# Patient Record
Sex: FEMALE | Race: WHITE | Employment: FULL TIME | URBAN - METROPOLITAN AREA
[De-identification: names, ages, dates, MRNs, and addresses within clinical notes are randomized per-mention and may not be internally consistent; named-entity substitution may affect disease eponyms.]

---

## 2017-10-05 ENCOUNTER — OFFICE VISIT (OUTPATIENT)
Dept: OBGYN CLINIC | Age: 32
End: 2017-10-05

## 2017-10-05 VITALS
BODY MASS INDEX: 34.07 KG/M2 | WEIGHT: 169 LBS | HEIGHT: 59 IN | DIASTOLIC BLOOD PRESSURE: 80 MMHG | SYSTOLIC BLOOD PRESSURE: 120 MMHG

## 2017-10-05 DIAGNOSIS — Z34.80 SUPERVISION OF OTHER NORMAL PREGNANCY, ANTEPARTUM: Primary | ICD-10-CM

## 2017-10-05 NOTE — MR AVS SNAPSHOT
Visit Information Date & Time Provider Department Dept. Phone Encounter #  
 10/5/2017  2:20 PM Claudy Yung MD Marco Antonio Roman [phone] [id number] Upcoming Health Maintenance Date Due INFLUENZA AGE 9 TO ADULT 8/1/2017 PAP AKA CERVICAL CYTOLOGY 9/29/2018 Allergies as of 10/5/2017  Review Complete On: 10/5/2017 By: Iesha Vergara LPN Severity Noted Reaction Type Reactions Amoxicillin  01/10/2013   Systemic Hives Current Immunizations  Reviewed on 5/2/2016 Name Date Influenza Vaccine Krissy Bloodgood) 11/4/2015 Tdap 2/10/2016 Not reviewed this visit Vitals BP Height(growth percentile) Weight(growth percentile) LMP BMI OB Status 120/80 4' 11\" (1.499 m) 169 lb (76.7 kg) 07/27/2017 34.13 kg/m2 Pregnant Smoking Status Current Every Day Smoker BMI and BSA Data Body Mass Index Body Surface Area  
 34.13 kg/m 2 1.79 m 2 Preferred Pharmacy Pharmacy Name Phone [id number] [place], [place] - [address] [facility] [phone] Your Updated Medication List  
  
   
This list is accurate as of: 10/5/17  2:49 PM.  Always use your most recent med list.  
  
  
  
  
 liothyronine 5 mcg tablet Commonly known as:  CYTOMEL PRENATAL VITAMIN PO Take  by mouth. SYNTHROID 125 mcg tablet Generic drug:  levothyroxine TAKE 1 TABLET BY MOUTH EVERY DAY Patient Instructions Weeks 10 to 14 of Your Pregnancy: Care Instructions Your Care Instructions By weeks 10 to 15 of your pregnancy, the placenta has formed inside your uterus. It is possible to hear your baby's heartbeat with a special ultrasound device. Your baby's eyes can and do move. The arms and legs can bend. This is a good time to think about testing for birth defects. There are two types of tests: screening and diagnostic.  Screening tests show the chance that a baby has a certain birth defect. They can't tell you for sure that your baby has a problem. Diagnostic tests show if a baby has a certain birth defect. It's your choice whether to have these tests. You and your partner can talk to your doctor or midwife about birth defects tests. Follow-up care is a key part of your treatment and safety. Be sure to make and go to all appointments, and call your doctor if you are having problems. It's also a good idea to know your test results and keep a list of the medicines you take. How can you care for yourself at home? Decide about tests · You can have screening tests and diagnostic tests to check for birth defects. The decision to have a test for birth defects is personal. Think about your age, your chance of passing on a family disease, your need to know about any problems, and what you might do after you have the test results. ¨ Triple or quadruple (quad) blood tests. These screening tests can be done between 15 and 20 weeks of pregnancy. They check the amounts of three or four substances in your blood. The doctor looks at these test results, along with your age and other factors, to find out the chance that your baby may have certain problems. ¨ Amniocentesis. This diagnostic test is used to look for chromosomal problems in the baby's cells. It can be done between 15 and 20 weeks of pregnancy, usually around week 16. 
¨ Nuchal translucency test. This test uses ultrasound to measure the thickness of the area at the back of the baby's neck. An increase in the thickness can be an early sign of Down syndrome. ¨ Chorionic villus sampling ([facility]). This is a test that looks for certain genetic problems with your baby. The same genes that are in your baby are in the placenta. A small piece of the placenta is taken out and tested. This test is done when you are 10 to 13 weeks pregnant. Ease discomfort · Slow down and take naps when you feel tired. · If your emotions swing, talk to someone. Crying, anxiety, and concentration problems are common. · If your gums bleed, try a softer toothbrush. If your gums are puffy and bleed a lot, see your dentist. 
· If you feel dizzy: ¨ Get up slowly after sitting or lying down. ¨ Drink plenty of fluids. ¨ Eat small snacks to keep your blood sugar stable. ¨ Put your head between your legs as though you were tying your shoelaces. ¨ Lie down with your legs higher than your head. Use pillows to prop up your feet. · If you have a headache: 
¨ Lie down. ¨ Ask your partner or a good friend for a neck massage. ¨ Try cool cloths over your forehead or across the back of your neck. ¨ Use acetaminophen (Tylenol) for pain relief. Do not use nonsteroidal anti-inflammatory drugs (NSAIDs), such as ibuprofen (Advil, Motrin) or naproxen (Aleve), unless your doctor says it is okay. · If you have a nosebleed, pinch your nose gently, and hold it for a short while. To prevent nosebleeds, try massaging a small dab of petroleum jelly, such as Vaseline, in your nostrils. · If your nose is stuffed up, try saline (saltwater) nose sprays. Do not use decongestant sprays. Care for your breasts · Wear a bra that gives you good support. · Know that changes in your breasts are normal. 
¨ Your breasts may get larger and more tender. Tenderness usually gets better by 12 weeks. ¨ Your nipples may get darker and larger, and small bumps around your nipples may show more. ¨ The veins in your chest and breasts may show more. · Don't worry about \"toughening'\" your nipples. Breastfeeding will naturally do this. Where can you learn more? Go to http://jo ann-elizabeth.info/. Enter B284 in the search box to learn more about \"Weeks 10 to 14 of Your Pregnancy: Care Instructions. \" Current as of: March 16, 2017 Content Version: 11.3 © 0990-3575 [employer].  Care instructions adapted under license by [address] (which disclaims liability or warranty for this information). If you have questions about a medical condition or this instruction, always ask your healthcare professional. [address] any warranty or liability for your use of this information. Introducing [facility]! Romayne Tommy introduces Beaming patient portal. Now you can access parts of your medical record, email your doctor's office, and request medication refills online. 1. In your internet browser, go to [email or web address]. [employer] 2. Click on the First Time User? Click Here link in the Sign In box. You will see the New Member Sign Up page. 3. Enter your Beaming Access Code exactly as it appears below. You will not need to use this code after youve completed the sign-up process. If you do not sign up before the expiration date, you must request a new code. · Beaming Access Code: [email or web address] Expires: 1/3/2018  2:49 PM 
 
4. Enter the last four digits of your Social Security Number (xxxx) and Date of Birth (mm/dd/yyyy) as indicated and click Submit. You will be taken to the next sign-up page. 5. Create a Beaming ID. This will be your Beaming login ID and cannot be changed, so think of one that is secure and easy to remember. 6. Create a Beaming password. You can change your password at any time. 7. Enter your Password Reset Question and Answer. This can be used at a later time if you forget your password. 8. Enter your e-mail address. You will receive e-mail notification when new information is available in [address] 9. Click Sign Up. You can now view and download portions of your medical record. 10. Click the Download Summary menu link to download a portable copy of your medical information. If you have questions, please visit the Frequently Asked Questions section of the Beaming website.  Remember, Beaming is NOT to be used for urgent needs. For medical emergencies, dial 911. Now available from your iPhone and Android! Please provide this summary of care documentation to your next provider. Your primary care clinician is listed as Mayme Certain. If you have any questions after today's visit, please call [phone].

## 2017-10-05 NOTE — PATIENT INSTRUCTIONS

## 2017-10-05 NOTE — PROGRESS NOTES
[facility]  [email or web address]  [phone]    Michelle Nolan MD, [address] [facility]     Chief complaint:  Irregular cycles  Last cycle; Patient's last menstrual period was 2017. This is a new concern and an evaluation is planned. Current pregnancy history:  Victoria Jacobs is a , 29 y.o. female [facility]   She presents for the evaluation of irregular menses and a positive pregnancy test.    LMP history:  Patient's last menstrual period was 2017. .  The date of the beginning of her last menstrual period is certain. Her menses are regular. Her cycles occur about every 4 weeks. A urine pregnancy test was positive about 5 weeks ago. She was not using contraception at the estimated time of conception. Based on her LMP, her EGA is 10 weeks,0 days with and EDC of 2018. Ultrasound data:  She had an ultrasound today which revealed a viable plaza pregnancy with a gestational age of 10 weeks and 4 days giving an EDC of 2018. Pregnancy symptoms:  She reports breast tenderness  nausea  \"morning sickness\"  positive home pregnancy test  frequent urination. She denies fatigue  Pelvic pain  Vaginal bleeding. Since she found out she is pregnant, she has gained, 5 lbs. She reports her prepregnancy weight as 164 pounds. Relevant past pregnancy history:  She has the following pregnancy history:none. She does not have a history of  delivery. She does not have a history of a prior  section. Relevant past medical history:(relevant to this pregnancy):   none     Pap smear history:  Last pap smear: May 14, 2014  Results: within normal limits    Occupational history  Her occupation is: full time job doing stylist and assistant. Substance history:   She does report current tobacco use. She does not report current alcohol use. She does not report current drug use. Exposure history:    There are indoor cat(s) in the home. The patient was instructed not to change cat litter boxes during pregnancy. She does report close contact with children on a regular basis. She has chicken pox or the vaccine in the past.   Patient does not report issues with domestic violence. Genetic Screening/Teratology Counseling:   (Includes patient, baby's father, or anyone in either family with:)  3.  Patient's age >/= 28 at EDC?--33      FOB age: 31years old. 2.  Thalassemia ([facility], [facility], [facility], or  background): MCV<80?--no  3. Neural tube defect (meningomyelocele, spina bifida, anencephaly)? --no  4. Congenital heart defect?--no  5. Down syndrome?--no  6. Stevie-Sachs ([address] [place] [address])? --no  7. Canavan's Disease?--no  8. Familial Dysautonomia?--no   9. Sickle cell disease or trait ()? --no   Has she been tested for sickle trait: No  10. Hemophilia or other blood disorders?--no  11. Muscular dystrophy?--no  12. Cystic fibrosis? --no  13. Hill's Chorea?--no  14. Mental retardation/autism (if yes was person tested for Fragile X)?-no  15. Other inherited genetic or chromosomal disorder?- no  16. Maternal metabolic disorder (DM, PKU, etc)? --no  17. Patient or FOB with a child with a birth defect not listed above?--no  17a. Patient or FOB with a birth defect themselves?--no  25. Recurrent pregnancy loss, or stillbirth?--no  19. Any medications since LMP other than prenatal vitamins (include vitamins, supplements, OTC meds, drugs, alcohol)? -- PNV, tyl, synthroid. 20.  Any other genetic/environmental exposure to discuss?--no. Infection History:  1. Lives with someone with TB or TB exposed?--no  2. Patient or partner has history of genital herpes?--no  3. Rash or viral illness since LMP?--no  4. History of STD (GC, CT, HPV, syphilis, HIV)? --no  5. Have you received a flu vaccine for the most recent flu season? -- Yes  6.   Have you or your sexual partner(s) travelled to a [facility] area in the last 6 months? -- no    Past Medical History:   Diagnosis Date    Anxiety     Arthritis     Bursitis     and scapulothoracic dyskinesia, right shoulder    Fatigue     Headache     Joint pain     Kidney stones     Memory loss     Due to Radiation from thryoid cancer in 2013.  Muscle pain     Pap smear for cervical cancer screening 14    negative    Psychiatric problem     Anxiety    Shoulder bursitis     right    Thyroid cancer ([facility] [address] 1/10/2013    Complete Thyroidectomy . Past Surgical History:   Procedure Laterality Date    HX LITHOTRIPSY      HX ORTHOPAEDIC Left     metal plate/ 7screwes in Left wrist; Broken ulna and radius.  HX THYROIDECTOMY  10/2010    total     Social History     Occupational History    Not on file. Social History Main Topics    Smoking status: Current Every Day Smoker     Packs/day: 0.50    Smokeless tobacco: Never Used    Alcohol use No    Drug use: No      Comment: pt smoked marijuana in the past    Sexual activity: Yes     Partners: Male     Birth control/ protection: None     Family History   Problem Relation Age of Onset    Elevated Lipids Mother     Heart Disease Father     Cancer Other     Headache Other     Dementia Other     MS Other     Parkinson's Disease Other     Breast Cancer Other      paternal great aunt    Breast Cancer Paternal Grandmother     Heart Disease Maternal Grandmother     Heart Disease Maternal Grandfather     MS Maternal Aunt      OB History    Para Term  AB Living   2 1 1 0 0 1   SAB TAB Ectopic Molar Multiple Live Births   0 0 0  0 1      # Outcome Date GA Lbr Jorge/2nd Weight Sex Delivery Anes PTL Lv   2 Current            1 Term 16 40w1d 06:37 / 05:36 6 lb 7.4 oz (2.93 kg) F VAVD EPIDURAL AN N KOJO        Allergies   Allergen Reactions    Amoxicillin Hives     Prior to Admission medications    Medication Sig Start Date End Date Taking?  Authorizing Provider   SYNTHROID 125 mcg tablet TAKE 1 TABLET BY MOUTH EVERY DAY 8/25/16  Yes Historical Provider   PRENATAL VIT W-CA,FE,FA,<1 MG, (PRENATAL VITAMIN PO) Take  by mouth.    Yes Historical Provider        Review of Systems - History obtained from the patient  Constitutional: negative for weight loss, fever, night sweats  HEENT: negative for hearing loss, earache, congestion, snoring, sorethroat  CV: negative for chest pain, palpitations, edema  Resp: negative for cough, shortness of breath, wheezing  GI: negative for change in bowel habits, abdominal pain, black or bloody stools  : negative for frequency, dysuria, hematuria, vaginal discharge  MSK: negative for back pain, joint pain, muscle pain  Breast: negative for breast lumps, nipple discharge, galactorrhea  Skin :negative for itching, rash, hives  Neuro: negative for dizziness, headache, confusion, weakness  Psych: negative for anxiety, depression, change in mood  Heme/lymph: negative for bleeding, bruising, pallor    Objective:  Visit Vitals    /80    Ht 4' 11\" (1.499 m)    Wt 169 lb (76.7 kg)    LMP 07/27/2017    BMI 34.13 kg/m2       Physical Exam:   Constitutional  · Appearance: well-nourished, well developed, alert, in no acute distress    HENT  · Head  · Face: appears normal  · Eyes: appear normal  · Ears: normal  · Mouth: normal  · Lips: no lesions    Neck  · Inspection/Palpation: normal appearance, no masses or tenderness  · Lymph Nodes: no lymphadenopathy present  · Thyroid: gland size normal, nontender, no nodules or masses present on palpation    Chest  · Respiratory Effort: breathing unlabored  · Auscultation: normal breath sounds    Cardiovascular  · Heart:  · Auscultation: regular rate and rhythm without murmur    Breasts  · Inspection of Breasts: breasts symmetrical, no skin changes, no discharge present, nipple appearance normal, no skin retraction present  · Palpation of Breasts and Axillae: no masses present on palpation, no breast tenderness  · Axillary Lymph Nodes: no lymphadenopathy present    Gastrointestinal  · Abdominal Examination: abdomen non-tender to palpation, normal bowel sounds, no masses present  · Liver and spleen: no hepatomegaly present, spleen not palpable  · Hernias: no hernias identified    Genitourinary  · External Genitalia: normal appearance for age, no discharge present, no tenderness present, no inflammatory lesions present, no masses present, no atrophy present  · Vagina: normal vaginal vault without central or paravaginal defects, no discharge present, no inflammatory lesions present, no masses present  · Bladder: non-tender to palpation  · Urethra: appears normal  · Cervix: normal appearing with discharge or lesions, os closed  · Uterus: enlarged, normal shape, soft  · Adnexa: no adnexal tenderness present, no adnexal masses present  · Perineum: perineum within normal limits, no evidence of trauma, no rashes or skin lesions present  · Anus: anus within normal limits, no hemorrhoids present  · Inguinal Lymph Nodes: no lymphadenopathy present    Skin  · General Inspection: no rash, no lesions identified    Neurologic/Psychiatric  · Mental Status:  · Orientation: grossly oriented to person, place and time  · Mood and Affect: mood normal, affect appropriate    Assessment:   Irregular cycles  Encounter Diagnosis   Name Primary?  Supervision of other normal pregnancy, antepartum Yes     Due date: LMP    Plan:   We discussed options of genetic screening and diagnostic testing including:  CF testing, CVS, amniocentesis first trimester screening/NT, MSAFP, and NIPT (handout given to patient for review and consent)  She is interested in prenatal genetic testing of her fetus. Plan: NT  The course of pregnancy discussed including visit schedule, ultrasounds, lab testing, etc.  Pt advised to avoid alcoholic beverages and illicit/recreational drugs use  Recommend taking prenatal vitamins or folic acid daily with DHA/fish oil.   The hospital and practice style discussed with coverage system. We discussed nutrition, toxoplasmosis precautions, sexual activity, exercise, need for influenza vaccine, environmental and work hazards, travel advice, screen for domestic violence, need for seat belts. We discussed seafood, unpasteurized dairy products, deli meat, artificial sweeteners, and caffeine intake. We recommend avoiding chemical and toxin exposures when possible. Information on prenatal and breastfeeding classes given. Information on circumcision given  Patient encouraged not to smoke. Discussed current prescription drug use. Given medication list.  Discussed the use of over the counter medications and chemicals. She is advised to contact her MD with any questions. Pt understands risk of hemorrhage during pregnancy and post delivery and would accept blood products if necessary in life-threatening emergencies  We discussed signs and symptoms of abnormal pregnancies and miscarriage. Handouts given to pt. Physician review of ultrasound performed by technician  Today's ultrasound report and images were reviewed and discussed with the patient. Please see images and imaging report entered by technician in PACS for more detail and progress note and diagnosis entered by MD.    Kim Shaikh MD    TA ULTRASOUND PERFORMED. A SINGLE VIABLE 9W4D IUP IS SEEN WITH NORMAL CARDIAC RHYTHM. GESTATIONAL AGE BASED ON TODAYS US.  A NORMAL APPEARING YOLK [address] IS SEEN. RIGHT & LEFT OVARIES APPEAR WITHIN NORMAL LIMITS. NO FREE FLUID SEEN IN THE CDS. Orders Placed This Encounter    CULTURE, URINE    HEP B SURFACE AG    HIV SCREEN, 4TH GEN.  W/REFLEX CONFIRM    CBC W/O DIFF    RUBELLA AB, IGG    T PALLIDUM SCREEN W/REFLEX    REFERRAL TO PERINATOLOGY    TYPE, ABO & RH    ANTIBODY SCREEN    PAP IG, CT-NG, HPV 35&23,43([id number], [id number])     Follow-up Disposition: Not on File

## 2017-10-06 LAB — BACTERIA UR CULT: NORMAL

## 2017-10-07 LAB
ABO GROUP BLD: NORMAL
ANTIBODY SCREEN, EXTERNAL: NEGATIVE
BLD GP AB SCN SERPL QL: NEGATIVE
ERYTHROCYTE [DISTWIDTH] IN BLOOD BY AUTOMATED COUNT: 14.1 % (ref 12.3–15.4)
HBSAG, EXTERNAL: NEGATIVE
HBV SURFACE AG SERPL QL IA: NEGATIVE
HCT VFR BLD AUTO: 38.1 % (ref 34–46.6)
HCT, EXTERNAL: 38.1
HGB BLD-MCNC: 12.9 G/DL (ref 11.1–15.9)
HGB, EXTERNAL: 12.9
HIV 1+2 AB+HIV1 P24 AG SERPL QL IA: NON REACTIVE
HIV, EXTERNAL: NON REACTIVE
MCH RBC QN AUTO: 30.4 PG (ref 26.6–33)
MCHC RBC AUTO-ENTMCNC: 33.9 G/DL (ref 31.5–35.7)
MCV RBC AUTO: 90 FL (ref 79–97)
PLATELET # BLD AUTO: 344 X10E3/UL (ref 150–379)
PLATELET CNT,   EXTERNAL: 344
RBC # BLD AUTO: 4.25 X10E6/UL (ref 3.77–5.28)
RH BLD: POSITIVE
RUBELLA, EXTERNAL: NORMAL
RUBV IGG SERPL IA-ACNC: 9.23 INDEX
T PALLIDUM AB SER QL IA: NEGATIVE
T. PALLIDUM, EXTERNAL: NEGATIVE
URINALYSIS, EXTERNAL: NORMAL
WBC # BLD AUTO: 12.4 X10E3/UL (ref 3.4–10.8)

## 2017-10-13 LAB
C TRACH RRNA CVX QL NAA+PROBE: NEGATIVE
CYTOLOGIST CVX/VAG CYTO: ABNORMAL
CYTOLOGY CVX/VAG DOC THIN PREP: ABNORMAL
DX ICD CODE: ABNORMAL
HPV I/H RISK 1 DNA CVX QL PROBE+SIG AMP: POSITIVE
HPV16 DNA CVX QL PROBE+SIG AMP: POSITIVE
HPV18 DNA CVX QL PROBE+SIG AMP: NEGATIVE
Lab: ABNORMAL
N GONORRHOEA RRNA CVX QL NAA+PROBE: NEGATIVE
OTHER STN SPEC: ABNORMAL
PATH REPORT.FINAL DX SPEC: ABNORMAL
STAT OF ADQ CVX/VAG CYTO-IMP: ABNORMAL

## 2017-10-13 NOTE — PROGRESS NOTES
Pap smear abnormal, recommend colposcopy. Update FS/pap. Notify patient, tickle for follow-up. Premedicate with 600mg Ibuprofen, if no contraindication (for example: pregnancy/allergy).   rec daily folic acid/PNV to help clear HPV, can discuss in detail at NV

## 2017-10-17 ENCOUNTER — TELEPHONE (OUTPATIENT)
Dept: OBGYN CLINIC | Age: 32
End: 2017-10-17

## 2017-10-17 NOTE — TELEPHONE ENCOUNTER
LMTCB about appointment at the  center for her NT on Tuesday 10/24/17 at 8:45am. This appointment is here at [address]. To cancel or reschedule she can contact their office at [phone]. Please also advise of abnormal pap smear results and MD recommendations. Pap smear abnormal, recommend colposcopy. Update FS/pap. Notify patient, tickle for follow-up. Premedicate with 600mg Ibuprofen, if no contraindication (for example: pregnancy/allergy).    rec daily folic acid/PNV to help clear HPV, can discuss in detail at NV

## 2017-10-18 NOTE — TELEPHONE ENCOUNTER
Patient left a message at 1:41PM on 10/17/17. This nurse left a message for the patient to call back.

## 2017-10-19 NOTE — TELEPHONE ENCOUNTER
Patient aware of appt details for the [facility] and needs to R/S. Patient given [facility] number [phone]. Patient also aware of pap smear results, see note.

## 2017-10-24 ENCOUNTER — HOSPITAL ENCOUNTER (OUTPATIENT)
Dept: PERINATAL CARE | Age: 32
Discharge: HOME | End: 2017-10-24
Attending: OBSTETRICS & GYNECOLOGY
Payer: COMMERCIAL

## 2017-10-24 PROCEDURE — 36416 COLLJ CAPILLARY BLOOD SPEC: CPT | Performed by: OBSTETRICS & GYNECOLOGY

## 2017-10-24 PROCEDURE — 76813 OB US NUCHAL MEAS 1 GEST: CPT | Performed by: OBSTETRICS & GYNECOLOGY

## 2017-10-27 ENCOUNTER — TELEPHONE (OUTPATIENT)
Dept: PERINATAL CARE | Age: 32
End: 2017-10-27

## 2017-10-28 PROBLEM — Z34.80 PRENATAL CARE OF MULTIGRAVIDA, ANTEPARTUM: Status: ACTIVE | Noted: 2017-10-28

## 2017-10-29 NOTE — PROGRESS NOTES
MFM US/NT-ultrasound component normal.  Update prenatals. Confirm NIPS/1st trimester serum results in chart.

## 2017-10-30 ENCOUNTER — OFFICE VISIT (OUTPATIENT)
Dept: OBGYN CLINIC | Age: 32
End: 2017-10-30

## 2017-10-30 ENCOUNTER — ROUTINE PRENATAL (OUTPATIENT)
Dept: OBGYN CLINIC | Age: 32
End: 2017-10-30

## 2017-10-30 VITALS
HEIGHT: 59 IN | BODY MASS INDEX: 34.07 KG/M2 | WEIGHT: 169 LBS | DIASTOLIC BLOOD PRESSURE: 62 MMHG | SYSTOLIC BLOOD PRESSURE: 120 MMHG

## 2017-10-30 DIAGNOSIS — M54.2 NECK PAIN: ICD-10-CM

## 2017-10-30 DIAGNOSIS — L98.9 SKIN LESION: ICD-10-CM

## 2017-10-30 DIAGNOSIS — R87.810 CERVICAL HIGH RISK HUMAN PAPILLOMAVIRUS (HPV) DNA TEST POSITIVE: Primary | ICD-10-CM

## 2017-10-30 DIAGNOSIS — Z72.0 TOBACCO USE: ICD-10-CM

## 2017-10-30 DIAGNOSIS — R51.9 PREGNANCY HEADACHE IN FIRST TRIMESTER: ICD-10-CM

## 2017-10-30 DIAGNOSIS — O26.891 PREGNANCY HEADACHE IN FIRST TRIMESTER: ICD-10-CM

## 2017-10-30 NOTE — MR AVS SNAPSHOT
Visit Information Date & Time Provider Department Dept. Phone Encounter #  
 10/30/2017  2:50 PM Clara Su MD Marco Antonio Roman [phone] [id number] Your Appointments 12/12/2017  2:00 PM  
ULTRASOUND with ULTRASOUND1JAROCHO Marco Antonio Roman ([address] [place] [address]) Appt Note: 20wk u/s then ob TP  
 [address] [facility] [address] [place] [address] [facility] [address]  
[address]  
  
    
 12/12/2017  2:30 PM  
OB VISIT with MD Marco Antonio Alcocer ([address] [facility]) Appt Note: 20wk u/s then ob TP  
 [address] [facility] [address] [facility] [id number]  
[address] Upcoming Health Maintenance Date Due INFLUENZA AGE 9 TO ADULT 8/1/2017 PAP AKA CERVICAL CYTOLOGY 10/5/2020 Allergies as of 10/30/2017  Review Complete On: 10/30/2017 By: Raffi Rome LPN Severity Noted Reaction Type Reactions Amoxicillin  01/10/2013   Systemic Hives Current Immunizations  Reviewed on 5/2/2016 Name Date Influenza Vaccine Joseph Galvez) 11/4/2015 Tdap 2/10/2016 Not reviewed this visit Vitals BP Height(growth percentile) Weight(growth percentile) LMP BMI OB Status 120/62 4' 11\" (1.499 m) 169 lb (76.7 kg) 07/27/2017 34.13 kg/m2 Pregnant Smoking Status Current Every Day Smoker BMI and BSA Data Body Mass Index Body Surface Area  
 34.13 kg/m 2 1.79 m 2 Preferred Pharmacy Pharmacy Name Phone [id number] [place], [place] - [address] AT [facility] [phone] Your Updated Medication List  
  
   
This list is accurate as of: 10/30/17  2:59 PM.  Always use your most recent med list.  
  
  
  
  
 PRENATAL VITAMIN PO Take  by mouth. SYNTHROID 125 mcg tablet Generic drug:  levothyroxine TAKE 1 TABLET BY MOUTH EVERY DAY Patient Instructions Weeks 14 to 18 of Your Pregnancy: Care Instructions Your Care Instructions During this time, you may start to \"show,\" so that you look pregnant to people around you. You may also notice some changes in your skin, such as itchy spots on your palms or acne on your face. Your baby is now able to pass urine, and your baby's first stool (meconium) is starting to collect in his or her intestines. Hair is also beginning to grow on your baby's head. At your next visit, between weeks 18 and 20, your doctor may do an ultrasound test. The test allows your doctor to check for certain problems. Your doctor can also tell the sex of your baby. This is a good time to think about whether you want to know whether your baby is a boy or a girl. Talk to your doctor about getting a flu shot to help keep you healthy during your pregnancy. As your pregnancy moves along, it is common to worry or feel anxious. Your body is changing a lot. And you are thinking about giving birth, the health of your baby, and becoming a parent. You can learn to cope with any anxiety and stress you feel. Follow-up care is a key part of your treatment and safety. Be sure to make and go to all appointments, and call your doctor if you are having problems. It's also a good idea to know your test results and keep a list of the medicines you take. How can you care for yourself at home? ?Reduce stress ? · Ask for help with cooking and housekeeping. ? · Figure out who or what causes your stress. Avoid these people or situations as much as possible. ? · Relax every day. Taking 10- to 15-minute breaks can make a big difference. Take a walk, listen to music, or take a warm bath. ? · Learn relaxation techniques at prenatal or yoga class. Or buy a relaxation tape. ? · List your fears about having a baby and becoming a parent.  Share the list with someone you trust. Decide which worries are really small, and try to let them go. Exercise ? · If you did not exercise much before pregnancy, start slowly. Walking is best. [place] yourself, and do a little more every day. ? · Brisk walking, easy jogging, low-impact aerobics, water aerobics, and yoga are good choices. Some sports, such as scuba diving, horseback riding, downhill skiing, gymnastics, and water skiing, are not a good idea. ? · Try to do at least 2½ hours a week of moderate exercise, such as a fast walk. One way to do this is to be active 30 minutes a day, at least 5 days a week. It's fine to be active in blocks of 10 minutes or more throughout your day and week. ? · Wear loose clothing. And wear shoes and a bra that provide good support. ? · Warm up and cool down to start and finish your exercise. ? · If you want to use weights, be sure to use light weights. They reduce stress on your joints. ?Stay at the best weight for you ? · Experts recommend that you gain about 1 pound a month during the first 3 months of your pregnancy. ? · Experts recommend that you gain about 1 pound a week during your last 6 months of pregnancy, for a total weight gain of 25 to 35 pounds. ? · If you are underweight, you will need to gain more weight (about 28 to 40 pounds). ? · If you are overweight, you may not need to gain as much weight (about 15 to 25 pounds). ? · If you are gaining weight too fast, use common sense. Exercise every day, and limit sweets, fast foods, and fats. Choose lean meats, fruits, and vegetables. ? · If you are having twins or more, your doctor may refer you to a dietitian. Where can you learn more? Go to http://jo ann-elizabeth.info/. Enter A033 in the search box to learn more about \"Weeks 14 to 18 of Your Pregnancy: Care Instructions. \" Current as of: March 16, 2017 Content Version: 11.4 © 8238-3317 Healthwise, Incorporated. Care instructions adapted under license by [employer] (which disclaims liability or warranty for this information). If you have questions about a medical condition or this instruction, always ask your healthcare professional. [address] any warranty or liability for your use of this information. Introducing [facility] HE[facility]! Wili Loredo introduces [email or web address] patient portal. Now you can access parts of your medical record, email your doctor's office, and request medication refills online. 1. In your internet browser, go to [email or web address] 2. Click on the First Time User? Click Here link in the Sign In box. You will see the New Member Sign Up page. 3. Enter your [email or web address] Access Code exactly as it appears below. You will not need to use this code after youve completed the sign-up process. If you do not sign up before the expiration date, you must request a new code. · [email or web address] Access Code: [email or web address] Expires: 1/3/2018  2:49 PM 
 
4. Enter the last four digits of your Social Security Number (xxxx) and Date of Birth (mm/dd/yyyy) as indicated and click Submit. You will be taken to the next sign-up page. 5. Create a [email or web address] ID. This will be your [email or web address] login ID and cannot be changed, so think of one that is secure and easy to remember. 6. Create a [email or web address] password. You can change your password at any time. 7. Enter your Password Reset Question and Answer. This can be used at a later time if you forget your password. 8. Enter your e-mail address. You will receive e-mail notification when new information is available in [address] 9. Click Sign Up. You can now view and download portions of your medical record. 10. Click the Download Summary menu link to download a portable copy of your medical information.  
 
If you have questions, please visit the Frequently Asked Questions section of the VocoMD. Remember, [employer] is NOT to be used for urgent needs. For medical emergencies, dial 911. Now available from your iPhone and Android! Please provide this summary of care documentation to your next provider. Your primary care clinician is listed as Kota Abdalla. If you have any questions after today's visit, please call [phone].

## 2017-10-30 NOTE — PROGRESS NOTES
[address] [facility] OB-GYN  [email or web address]  [phone]    Charity Allred MD, [facility]       Ob/Gyn visit    Chief Complaint:   Chief Complaint   Patient presents with   Alok Curry Routine Prenatal Visit    Colposcopy       History of Present Illness: This is a new problem being evaluated by this provider. Alena Damian is a , 29 y.o. female [facility]   She presents for an appointment for a pap smear abnormality consisting of positive HR HPV, positive 16, negative 18 in 2017. History of Present Illness: The patient is reporting having various skin lesions in groin area for several months. She reports the symptoms are is unchanged. Aggravating factors include none, pregnancy. And alleviating factors include none. History of Present Illness: The patient is reporting having HA for several weeks. She reports the symptoms are has worsened slightly. Aggravating factors include pregnancy. And alleviating factors include none, tyl.  Tried PT In past, h/o neck problems            Cervical cancer risk assessment:  Number of lifetime sexual partners: about 8  Approximate age of initiation of sexual intercourse: 17years old  Tobacco use history: 1/2 ppd, now 1-2 cig /day  Current tobacco use: Yes  Sexually transmitted disease exposure: Yes, HR HPV    Currently taking PNV/folic acid: YES    LMP: Patient's last menstrual period was 2017. 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     Muscle pain     Pap smear for cervical cancer screening 14    negative    Psychiatric problem     Anxiety    Shoulder bursitis     right    Thyroid cancer ([facility] [address] 1/10/2013    Complete Thyroidectomy 2012. Past Surgical History:   Procedure Laterality Date    HX LITHOTRIPSY      HX ORTHOPAEDIC Left     metal plate/ 7screwes in Left wrist; Broken ulna and radius.  HX THYROIDECTOMY  10/2010    total     Family History   Problem Relation Age of Onset    Elevated Lipids Mother     Heart Disease Father     Cancer Other     Headache Other     Dementia Other     MS Other     Parkinson's Disease Other     Breast Cancer Other      paternal great aunt    Breast Cancer Paternal Grandmother     Heart Disease Maternal Grandmother     Heart Disease Maternal Grandfather     MS Maternal Aunt      Social History     Social History    Marital status: SINGLE     Spouse name: N/A    Number of children: N/A    Years of education: N/A     Occupational History    Not on file. Social History Main Topics    Smoking status: Current Every Day Smoker     Packs/day: 0.50    Smokeless tobacco: Never Used    Alcohol use No    Drug use: No      Comment: pt smoked marijuana in the past    Sexual activity: Yes     Partners: Male     Birth control/ protection: None     Other Topics Concern    Not on file     Social History Narrative       Allergies   Allergen Reactions    Amoxicillin Hives     Current Outpatient Prescriptions   Medication Sig    SYNTHROID 125 mcg tablet TAKE 1 TABLET BY MOUTH EVERY DAY    PRENATAL VIT W-CA,FE,FA,<1 MG, (PRENATAL VITAMIN PO) Take  by mouth. No current facility-administered medications for this visit.         Review of Systems:  History obtained from the patient  Constitutional: negative for fevers, chills and weight loss  ENT ROS: see HPI  Respiratory: negative for cough, wheezing or dyspnea on exertion  Cardiovascular: negative for chest pain, irregular heart beats, exertional chest pressure/discomfort  Gastrointestinal: negative for dysphagia, nausea and vomiting  Genito-Urinary ROS: see HPI, skin changes  Inteument/breast:  See HPI  Musculoskeletal:negative for stiff joints, neck pain and muscle weakness  Endocrine ROS: negative for - breast changes, galactorrhea or temperature intolerance  Hematological and Lymphatic ROS: negative for - blood clots, bruising or swollen lymph nodes    Physical Exam:  Visit Vitals    /62    Ht 4' 11\" (1.499 m)    Wt 169 lb (76.7 kg)    BMI 34.13 kg/m2       GENERAL: alert, well appearing, and in no distress  HEAD: normocephalic, atraumatic. ABDOMEN: soft, nontender, nondistended, no masses or organomegaly   EGBUS: see image, healing papule right vulva/mons          VULVA: normal appearing vulva with no masses, tenderness or lesions  VAGINA: normal appearing vagina with normal color, no lesions, white and thin discharge  CERVIX: normal appearing cervix without discharge or lesions, non tender  UTERUS: uterus is normal size, shape, consistency and nontender   ADNEXA: normal adnexa in size, nontender and no masses  NEURO: alert, oriented, normal speech    Assessment:  Encounter Diagnoses   Name Primary?  Cervical high risk human papillomavirus (HPV) DNA test positive Yes    Skin lesion     Neck pain     Pregnancy headache in first trimester     Tobacco use    discuss possible HS    Plan:  The patient is advised that she should contact the office if she does not note improvement or if symptoms recur  She should contact our office with any questions or concerns  She could keep her routine annual exam appointment. We reviewed her pap smear results and risk factors for cervical cancer. We discussed r/b/a of colposcopy and pt electec to proceed with procedure. After being presented with the risks, benefits and alternatives has she signed a consent for the procedure. She states that she understands the need for the procedure and has no further questions. She was informed that she may experience discomfort.   PT referral   Consider ortho fu if NI  Disc safer tyl dosing in pregnancy  Disc good Kory Garrett  Notify MD if NI in above sx/concerns  rec tobacco cessation, disc inc risks with pregnancy    Procedure Note: Colposcopy  Colposcopy procedure note:  Chart reviewed for the following:   Melony JACOBS MD, have reviewed the History, Physical and updated the Allergic reactions for [address] [place] [address] Rd performed immediately prior to start of procedure:   Melony JACOBS MD, have performed the following reviews on Perez Foreman prior to the start of the procedure:            * Patient was identified by name and date of birth   * Agreement on procedure being performed was verified  * Risks and Benefits explained to the patient  * Procedure site verified and marked as necessary  * Patient was positioned for comfort  * Consent was signed and verified  Time: 3:07 PM    Date of procedure: 10/30/2017    Procedure performed by: Suhas Jaimes MD  Provider assisted by: Kavya Uriostegui LPN  Patient assisted by: spouse  How tolerated by patient: tolerated the procedure well with no complications  Comments: none    She was positioned in the dorsal lithotomy position and a speculum was inserted into the vagina. Dilute acetic acid was applied to the cervix. The colposcope was used to visualize the cervix with white and green light. The transformation zone was completely visualized. This colposcopy was satisfactory. Findings:   The procedure was notable for white epithelium on the cervix. Biopsies were not taken from the cervix . See accompanying image for biopsy sites. Monsels was not applied to the cervix to obtain hemostasis. Endocervical currettage: An endocervical curettage was not performed followed by a brush. Post Procedure Status: The patient tolerated the procedure well with minimal discomfort. She was observed for 10 minutes and released in good condition. She was given routine post-colposcopy instructions and handouts.   She should notify us with any concerns, fevers or heavy bleeding. She was informed that she will be contacted with the pathology results and recommendation for follow-up.   See image above

## 2017-10-30 NOTE — PATIENT INSTRUCTIONS
Weeks 14 to 18 of Your Pregnancy: Care Instructions  Your Care Instructions    During this time, you may start to \"show,\" so that you look pregnant to people around you. You may also notice some changes in your skin, such as itchy spots on your palms or acne on your face. Your baby is now able to pass urine, and your baby's first stool (meconium) is starting to collect in his or her intestines. Hair is also beginning to grow on your baby's head. At your next visit, between weeks 18 and 20, your doctor may do an ultrasound test. The test allows your doctor to check for certain problems. Your doctor can also tell the sex of your baby. This is a good time to think about whether you want to know whether your baby is a boy or a girl. Talk to your doctor about getting a flu shot to help keep you healthy during your pregnancy. As your pregnancy moves along, it is common to worry or feel anxious. Your body is changing a lot. And you are thinking about giving birth, the health of your baby, and becoming a parent. You can learn to cope with any anxiety and stress you feel. Follow-up care is a key part of your treatment and safety. Be sure to make and go to all appointments, and call your doctor if you are having problems. It's also a good idea to know your test results and keep a list of the medicines you take. How can you care for yourself at home? ?Reduce stress  ? · Ask for help with cooking and housekeeping. ? · Figure out who or what causes your stress. Avoid these people or situations as much as possible. ? · Relax every day. Taking 10- to 15-minute breaks can make a big difference. Take a walk, listen to music, or take a warm bath. ? · Learn relaxation techniques at prenatal or yoga class. Or buy a relaxation tape. ? · List your fears about having a baby and becoming a parent. Share the list with someone you trust. Decide which worries are really small, and try to let them go. Exercise  ?  · If you did not exercise much before pregnancy, start slowly. Walking is best. [place] yourself, and do a little more every day. ? · Brisk walking, easy jogging, low-impact aerobics, water aerobics, and yoga are good choices. Some sports, such as scuba diving, horseback riding, downhill skiing, gymnastics, and water skiing, are not a good idea. ? · Try to do at least 2½ hours a week of moderate exercise, such as a fast walk. One way to do this is to be active 30 minutes a day, at least 5 days a week. It's fine to be active in blocks of 10 minutes or more throughout your day and week. ? · Wear loose clothing. And wear shoes and a bra that provide good support. ? · Warm up and cool down to start and finish your exercise. ? · If you want to use weights, be sure to use light weights. They reduce stress on your joints. ?Stay at the best weight for you  ? · Experts recommend that you gain about 1 pound a month during the first 3 months of your pregnancy. ? · Experts recommend that you gain about 1 pound a week during your last 6 months of pregnancy, for a total weight gain of 25 to 35 pounds. ? · If you are underweight, you will need to gain more weight (about 28 to 40 pounds). ? · If you are overweight, you may not need to gain as much weight (about 15 to 25 pounds). ? · If you are gaining weight too fast, use common sense. Exercise every day, and limit sweets, fast foods, and fats. Choose lean meats, fruits, and vegetables. ? · If you are having twins or more, your doctor may refer you to a dietitian. Where can you learn more? Go to http://jo ann-elizabeth.info/. Enter C646 in the search box to learn more about \"Weeks 14 to 18 of Your Pregnancy: Care Instructions. \"  Current as of: March 16, 2017  Content Version: 11.4  © 1332-7721 [employer]. Care instructions adapted under license by [employer] (which disclaims liability or warranty for this information).  If you have questions about a medical condition or this instruction, always ask your healthcare professional. [place] [address] any warranty or liability for your use of this information.

## 2017-11-28 ENCOUNTER — ROUTINE PRENATAL (OUTPATIENT)
Dept: OBGYN CLINIC | Age: 32
End: 2017-11-28

## 2017-11-28 VITALS — DIASTOLIC BLOOD PRESSURE: 66 MMHG | WEIGHT: 173.2 LBS | BODY MASS INDEX: 34.98 KG/M2 | SYSTOLIC BLOOD PRESSURE: 124 MMHG

## 2017-11-28 DIAGNOSIS — R31.9 HEMATURIA, UNSPECIFIED TYPE: ICD-10-CM

## 2017-11-28 DIAGNOSIS — Z34.80 PRENATAL CARE OF MULTIGRAVIDA, ANTEPARTUM: Primary | ICD-10-CM

## 2017-11-28 RX ORDER — BUTALBITAL, ACETAMINOPHEN AND CAFFEINE 50; 325; 40 MG/1; MG/1; MG/1
1 TABLET ORAL
Qty: 12 TAB | Refills: 0 | Status: SHIPPED | OUTPATIENT
Start: 2017-11-28 | End: 2018-01-09 | Stop reason: SDUPTHER

## 2017-11-28 NOTE — MR AVS SNAPSHOT
Visit Information Date & Time Provider Department Dept. Phone Encounter #  
 11/28/2017  1:40 PM Claudy Yung MD [place] [id number] Your Appointments 12/12/2017  2:00 PM  
ULTRASOUND with ULTRASOUND1JAROCHO Marco Antonio Roman ([address]) Appt Note: 20wk u/s then ob TP  
 [address]  
  
    
  
 12/12/2017  2:30 PM  
OB VISIT with MD Marco Antonio Carty ([address] [facility]) Appt Note: 20wk u/s then ob TP  
 [address] [facility] [address] [id number]  
[address] Upcoming Health Maintenance Date Due Influenza Age 5 to Adult 8/1/2017 PAP AKA CERVICAL CYTOLOGY 10/5/2020 Allergies as of 11/28/2017  Review Complete On: 11/28/2017 By: Ema Velasquez LPN Severity Noted Reaction Type Reactions Amoxicillin  01/10/2013   Systemic Hives Current Immunizations  Reviewed on 5/2/2016 Name Date Influenza Vaccine Krissy Bloodgood) 11/4/2015 Tdap 2/10/2016 Not reviewed this visit Vitals BP Weight(growth percentile) LMP BMI OB Status Smoking Status 124/66 173 lb 3.2 oz (78.6 kg) 07/27/2017 34.98 kg/m2 Pregnant Current Every Day Smoker Vitals History BMI and BSA Data Body Mass Index Body Surface Area 34.98 kg/m 2 1.81 m 2 Preferred Pharmacy Pharmacy Name Phone [id number] [place], [place] - [address] [facility] [phone] Your Updated Medication List  
  
   
This list is accurate as of: 11/28/17  1:53 PM.  Always use your most recent med list.  
  
  
  
  
 PRENATAL VITAMIN PO Take  by mouth. SYNTHROID 125 mcg tablet Generic drug:  levothyroxine TAKE 1 TABLET BY MOUTH EVERY DAY  
  
  
  
 Patient Instructions Weeks 14 to 18 of Your Pregnancy: Care Instructions Your Care Instructions During this time, you may start to \"show,\" so that you look pregnant to people around you. You may also notice some changes in your skin, such as itchy spots on your palms or acne on your face. Your baby is now able to pass urine, and your baby's first stool (meconium) is starting to collect in his or her intestines. Hair is also beginning to grow on your baby's head. At your next visit, between weeks 18 and 20, your doctor may do an ultrasound test. The test allows your doctor to check for certain problems. Your doctor can also tell the sex of your baby. This is a good time to think about whether you want to know whether your baby is a boy or a girl. Talk to your doctor about getting a flu shot to help keep you healthy during your pregnancy. As your pregnancy moves along, it is common to worry or feel anxious. Your body is changing a lot. And you are thinking about giving birth, the health of your baby, and becoming a parent. You can learn to cope with any anxiety and stress you feel. Follow-up care is a key part of your treatment and safety. Be sure to make and go to all appointments, and call your doctor if you are having problems. It's also a good idea to know your test results and keep a list of the medicines you take. How can you care for yourself at home? ?Reduce stress ? · Ask for help with cooking and housekeeping. ? · Figure out who or what causes your stress. Avoid these people or situations as much as possible. ? · Relax every day. Taking 10- to 15-minute breaks can make a big difference. Take a walk, listen to music, or take a warm bath. ? · Learn relaxation techniques at prenatal or yoga class. Or buy a relaxation tape. ? · List your fears about having a baby and becoming a parent.  Share the list with someone you trust. Decide which worries are really small, and try to let them go. Exercise ? · If you did not exercise much before pregnancy, start slowly. Walking is best. [place] yourself, and do a little more every day. ? · Brisk walking, easy jogging, low-impact aerobics, water aerobics, and yoga are good choices. Some sports, such as scuba diving, horseback riding, downhill skiing, gymnastics, and water skiing, are not a good idea. ? · Try to do at least 2½ hours a week of moderate exercise, such as a fast walk. One way to do this is to be active 30 minutes a day, at least 5 days a week. It's fine to be active in blocks of 10 minutes or more throughout your day and week. ? · Wear loose clothing. And wear shoes and a bra that provide good support. ? · Warm up and cool down to start and finish your exercise. ? · If you want to use weights, be sure to use light weights. They reduce stress on your joints. ?Stay at the best weight for you ? · Experts recommend that you gain about 1 pound a month during the first 3 months of your pregnancy. ? · Experts recommend that you gain about 1 pound a week during your last 6 months of pregnancy, for a total weight gain of 25 to 35 pounds. ? · If you are underweight, you will need to gain more weight (about 28 to 40 pounds). ? · If you are overweight, you may not need to gain as much weight (about 15 to 25 pounds). ? · If you are gaining weight too fast, use common sense. Exercise every day, and limit sweets, fast foods, and fats. Choose lean meats, fruits, and vegetables. ? · If you are having twins or more, your doctor may refer you to a dietitian. Where can you learn more? Go to http://jo ann-elizabeth.info/. Enter L752 in the search box to learn more about \"Weeks 14 to 18 of Your Pregnancy: Care Instructions. \" Current as of: March 16, 2017 Content Version: 11.4 © 9268-2290 Healthwise, [employer].  Care instructions adapted under license by [address] (which disclaims liability or warranty for this information). If you have questions about a medical condition or this instruction, always ask your healthcare professional. [address] any warranty or liability for your use of this information. Introducing [facility]! Moon Collado introduces [employer] patient portal. Now you can access parts of your medical record, email your doctor's office, and request medication refills online. 1. In your internet browser, go to [email or web address] 2. Click on the First Time User? Click Here link in the Sign In box. You will see the New Member Sign Up page. 3. Enter your [employer] Access Code exactly as it appears below. You will not need to use this code after youve completed the sign-up process. If you do not sign up before the expiration date, you must request a new code. · [employer] Access Code: [email or web address] Expires: 1/3/2018  1:49 PM 
 
4. Enter the last four digits of your Social Security Number (xxxx) and Date of Birth (mm/dd/yyyy) as indicated and click Submit. You will be taken to the next sign-up page. 5. Create a [employer]ent [employer] ID. This will be your [employer] login ID and cannot be changed, so think of one that is secure and easy to remember. 6. Create a [employer] password. You can change your password at any time. 7. Enter your Password Reset Question and Answer. This can be used at a later time if you forget your password. 8. Enter your e-mail address. You will receive e-mail notification when new information is available in [address] 9. Click Sign Up. You can now view and download portions of your medical record. 10. Click the Download Summary menu link to download a portable copy of your medical information. If you have questions, please visit the Frequently Asked Questions section of the [employer] website.  Remember, [employer] is NOT to be used for urgent needs. For medical emergencies, dial 911. Now available from your iPhone and Android! Please provide this summary of care documentation to your next provider. Your primary care clinician is listed as Barber Talamantes. If you have any questions after today's visit, please call [phone].

## 2017-11-28 NOTE — PROGRESS NOTES
_ [address] [facility]  [email or web address]  [phone]    Maribel Leong MD, FACOG     Follow-up OB visit    Chief Complaint   Patient presents with    Routine Prenatal Visit       Vitals:    17 1350   BP: 124/66   Weight: 173 lb 3.2 oz (78.6 kg)       Patient Active Problem List    Diagnosis Date Noted    Prenatal care of multigravida, antepartum 10/28/2017    Pregnant 2016    Chronic migraine without aura without status migrainosus, not intractable 2015    Analgesic overuse headache 2015    Migraine without aura, with intractable migraine, so stated, without mention of status migrainosus 2015    Thyroid cancer ([facility] [address] 01/10/2013       The patient reports the following concerns: constant headaches, has history of migraines; increase in urinary frequency  Accepts AFP testing. Urine - trace protein, trace blood, moderate ketones. See PN flowsheet for exam    29 y.o.  17w5d   Encounter Diagnosis   Name Primary?  Prenatal care of multigravida, antepartum Yes     Disc rba of fioricet/HA  Consider neuro if NI  Keep endo fu  Uti prec  Ur cx     [] SAB/bleeding precautions reviewed   [] PTL/PPROM precautions reviewed   [] Labor precautions reviewed   [] Fetal kick counts discussed   [] Labs reviewed with patient   [] Henreitta  precautions reviewed   [] Consent reviewed   [] Handouts given to pt   [] Glucola handout    [] GBS/labor/Magic Hour handout   []    []    []    []    Follow-up Disposition:  Return in about 4 weeks (around 2017) for Follow up OB visit.     Orders Placed This Encounter    AFP, MATERNAL SCREEN    butalbital-acetaminophen-caffeine (FIORICET, ESGIC) -40 mg per tablet       Maribel Leong MD

## 2017-11-28 NOTE — PATIENT INSTRUCTIONS
Weeks 14 to 18 of Your Pregnancy: Care Instructions  Your Care Instructions    During this time, you may start to \"show,\" so that you look pregnant to people around you. You may also notice some changes in your skin, such as itchy spots on your palms or acne on your face. Your baby is now able to pass urine, and your baby's first stool (meconium) is starting to collect in his or her intestines. Hair is also beginning to grow on your baby's head. At your next visit, between weeks 18 and 20, your doctor may do an ultrasound test. The test allows your doctor to check for certain problems. Your doctor can also tell the sex of your baby. This is a good time to think about whether you want to know whether your baby is a boy or a girl. Talk to your doctor about getting a flu shot to help keep you healthy during your pregnancy. As your pregnancy moves along, it is common to worry or feel anxious. Your body is changing a lot. And you are thinking about giving birth, the health of your baby, and becoming a parent. You can learn to cope with any anxiety and stress you feel. Follow-up care is a key part of your treatment and safety. Be sure to make and go to all appointments, and call your doctor if you are having problems. It's also a good idea to know your test results and keep a list of the medicines you take. How can you care for yourself at home? ?Reduce stress  ? · Ask for help with cooking and housekeeping. ? · Figure out who or what causes your stress. Avoid these people or situations as much as possible. ? · Relax every day. Taking 10- to 15-minute breaks can make a big difference. Take a walk, listen to music, or take a warm bath. ? · Learn relaxation techniques at prenatal or yoga class. Or buy a relaxation tape. ? · List your fears about having a baby and becoming a parent. Share the list with someone you trust. Decide which worries are really small, and try to let them go. Exercise  ?  · If you did not exercise much before pregnancy, start slowly. Walking is best. Trent Maral yourself, and do a little more every day. ? · Brisk walking, easy jogging, low-impact aerobics, water aerobics, and yoga are good choices. Some sports, such as scuba diving, horseback riding, downhill skiing, gymnastics, and water skiing, are not a good idea. ? · Try to do at least 2½ hours a week of moderate exercise, such as a fast walk. One way to do this is to be active 30 minutes a day, at least 5 days a week. It's fine to be active in blocks of 10 minutes or more throughout your day and week. ? · Wear loose clothing. And wear shoes and a bra that provide good support. ? · Warm up and cool down to start and finish your exercise. ? · If you want to use weights, be sure to use light weights. They reduce stress on your joints. ?Stay at the best weight for you  ? · Experts recommend that you gain about 1 pound a month during the first 3 months of your pregnancy. ? · Experts recommend that you gain about 1 pound a week during your last 6 months of pregnancy, for a total weight gain of 25 to 35 pounds. ? · If you are underweight, you will need to gain more weight (about 28 to 40 pounds). ? · If you are overweight, you may not need to gain as much weight (about 15 to 25 pounds). ? · If you are gaining weight too fast, use common sense. Exercise every day, and limit sweets, fast foods, and fats. Choose lean meats, fruits, and vegetables. ? · If you are having twins or more, your doctor may refer you to a dietitian. Where can you learn more? Go to http://jo ann-elizabeth.info/. Enter H244 in the search box to learn more about \"Weeks 14 to 18 of Your Pregnancy: Care Instructions. \"  Current as of: March 16, 2017  Content Version: 11.4  © 5739-2280 [employer]. Care instructions adapted under license by [employer] (which disclaims liability or warranty for this information).  If you have questions about a medical condition or this instruction, always ask your healthcare professional. [place] [address] any warranty or liability for your use of this information.

## 2017-11-30 LAB
AFP ADJ MOM SERPL: 0.95
AFP INTERP SERPL-IMP: NORMAL
AFP INTERP SERPL-IMP: NORMAL
AFP SERPL-MCNC: 32.6 NG/ML
AFP, MATERNAL, EXTERNAL: NEGATIVE
AGE AT DELIVERY: 31.1 YEARS
BACTERIA UR CULT: NORMAL
COMMENT, 01827: NORMAL
GA METHOD: NORMAL
GA: 17 WEEKS
IDDM PATIENT QL: NO
MULTIPLE PREGNANCY: NO
NEURAL TUBE DEFECT RISK FETUS: NORMAL %
RESULTS, 017004: NORMAL

## 2017-11-30 NOTE — PROGRESS NOTES
Normal results, add to prenatal records. We can review in detail with patient at next visit.   Add normal ur cx to PL: notify pt no evidence of uti

## 2017-12-04 ENCOUNTER — TELEPHONE (OUTPATIENT)
Dept: OBGYN CLINIC | Age: 32
End: 2017-12-04

## 2017-12-19 ENCOUNTER — ROUTINE PRENATAL (OUTPATIENT)
Dept: OBGYN CLINIC | Age: 32
End: 2017-12-19

## 2017-12-19 VITALS — DIASTOLIC BLOOD PRESSURE: 68 MMHG | SYSTOLIC BLOOD PRESSURE: 112 MMHG | WEIGHT: 177 LBS | BODY MASS INDEX: 35.75 KG/M2

## 2017-12-19 DIAGNOSIS — Z34.80 PRENATAL CARE OF MULTIGRAVIDA, ANTEPARTUM: ICD-10-CM

## 2017-12-19 NOTE — PATIENT INSTRUCTIONS

## 2017-12-19 NOTE — MR AVS SNAPSHOT
Visit Information Date & Time Provider Department Dept. Phone Encounter #  
 12/19/2017  4:00 PM MD Marco Antonio Flood [phone] [id number]  
  
 12/19/2017  4:00 PM  
OB VISIT with MD Marco Antonio Flood ([facility]) Appt Note: FOB  
 [address] [address]  
[phone]  
  
   
 [address]  
  
    
 1/9/2018  2:40 PM  
OB VISIT with MD Marco Antonio Flood ([facility]) Appt Note: 24wks TP  
 [address] [facility] [address] [place] [address]  
[phone]  
  
    
 2/6/2018  2:40 PM  
OB VISIT with MD Marco Antonio Flood ([facility]) Appt Note: 28wks w/ glucola TP  
 [address] [facility] [address] [place] [address]  
[phone] Upcoming Health Maintenance Date Due Influenza Age 5 to Adult 8/1/2017 PAP AKA CERVICAL CYTOLOGY 10/5/2020 Allergies as of 12/19/2017  Review Complete On: 12/19/2017 By: Judith Dutton RN Severity Noted Reaction Type Reactions Amoxicillin  01/10/2013   Systemic Hives Current Immunizations  Reviewed on 5/2/2016 Name Date Influenza Vaccine Donnita Saltness) 11/4/2015 Tdap 2/10/2016 Not reviewed this visit Vitals BP Weight(growth percentile) LMP BMI OB Status Smoking Status 112/68 177 lb (80.3 kg) 07/27/2017 35.75 kg/m2 Pregnant Current Every Day Smoker BMI and BSA Data Body Mass Index Body Surface Area 35.75 kg/m 2 1.83 m 2 Preferred Pharmacy Pharmacy Name Phone [id number] [place], [place] - [address] [facility] [phone] Your Updated Medication List  
  
   
This list is accurate as of: 12/19/17  3:57 PM.  Always use your most recent med list.  
  
  
  
  
 butalbital-acetaminophen-caffeine -40 mg per tablet Commonly known as:  William Morales Take 1 Tab by mouth every six (6) hours as needed for Pain. Max Daily Amount: 4 Tabs. PRENATAL VITAMIN PO Take  by mouth. SYNTHROID 125 mcg tablet Generic drug:  levothyroxine TAKE 1 TABLET BY MOUTH EVERY DAY Patient Instructions Weeks 18 to 22 of Your Pregnancy: Care Instructions Your Care Instructions Your baby is continuing to develop quickly. At this stage, babies can now suck their thumbs,  firmly with their hands, and open and close their eyelids. Sometime between 18 and 22 weeks, you will start to feel your baby move. At first, these small fetal movements feel like fluttering or \"butterflies. \" Some women say that they feel like gas bubbles. As the baby grows, these movements will become stronger. You may also notice that your baby kicks and hiccups. During this time, you may find that your nausea and fatigue are gone. Overall, you may feel better and have more energy than you did in your first trimester. But you may also have new discomforts now, such as sleep problems or leg cramps. This care sheet can help you ease these discomforts. Follow-up care is a key part of your treatment and safety. Be sure to make and go to all appointments, and call your doctor if you are having problems. It's also a good idea to know your test results and keep a list of the medicines you take. How can you care for yourself at home? Ease sleep problems · Avoid caffeine in drinks or chocolate late in the day. · Get some exercise every day. · Take a warm shower or bath before bed. · Have a light snack or glass of milk at bedtime. · Do relaxation exercises in bed to calm your mind and body. · Support your legs and back with extra pillows. Try a pillow between your legs if you sleep on your side. · Do not use sleeping pills or alcohol. They could harm your baby. Ease leg cramps · Do not massage your calf during the cramp. · Sit on a firm bed or chair. Straighten your leg, and bend your foot (flex your ankle) slowly upward, toward your knee. Bend your toes up and down. · Stand on a cool, flat surface. Stretch your toes upward, and take small steps walking on your heels. · Use a heating pad or hot water bottle to help with muscle ache. Prevent leg cramps · Be sure to get enough calcium. If you are worried that you are not getting enough, talk to your doctor. · Exercise every day, and stretch your legs before bed. · Take a warm bath before bed, and try leg warmers at night. Where can you learn more? Go to http://jo ann-elizabeth.info/. Enter G567 in the search box to learn more about \"Weeks 18 to 22 of Your Pregnancy: Care Instructions. \" Current as of: March 16, 2017 Content Version: 11.4 © 5654-5055 [employer]â€™[employer]. Care instructions adapted under license by [employer] (which disclaims liability or warranty for this information). If you have questions about a medical condition or this instruction, always ask your healthcare professional. [address] any warranty or liability for your use of this information. Introducing [facility]! Fred Schultz introduces [employer] patient portal. Now you can access parts of your medical record, email your doctor's office, and request medication refills online. 1. In your internet browser, go to [email or web address] 2. Click on the First Time User? Click Here link in the Sign In box. You will see the New Member Sign Up page. 3. Enter your [employer] Access Code exactly as it appears below. You will not need to use this code after youve completed the sign-up process. If you do not sign up before the expiration date, you must request a new code. · [employer] Access Code: [email or web address] Expires: 1/3/2018  1:49 PM 
 
4.  Enter the last four digits of your Social Security Number (xxxx) and Date of Birth (mm/dd/yyyy) as indicated and click Submit. You will be taken to the next sign-up page. 5. Create a Wavestream ID. This will be your Wavestream login ID and cannot be changed, so think of one that is secure and easy to remember. 6. Create a Wavestream password. You can change your password at any time. 7. Enter your Password Reset Question and Answer. This can be used at a later time if you forget your password. 8. Enter your e-mail address. You will receive e-mail notification when new information is available in [address] 9. Click Sign Up. You can now view and download portions of your medical record. 10. Click the Download Summary menu link to download a portable copy of your medical information. If you have questions, please visit the Frequently Asked Questions section of the Wavestream website. Remember, W[employer] is NOT to be used for urgent needs. For medical emergencies, dial 911. Now available from your iPhone and Android! Please provide this summary of care documentation to your next provider. Your primary care clinician is listed as Mayme Certain. If you have any questions after today's visit, please call [phone].

## 2017-12-19 NOTE — PROGRESS NOTES
[address] [facility]  [email or web address]  [phone]    Pool Marte MD, FACOG       [facility] [place] [facility]   FOLLOW UP OB NOTE WITH ULTRASOUND    Chief Complaint   Patient presents with    Routine Prenatal Visit     Vitals:    17 1553   BP: 112/68   Weight: 177 lb (80.3 kg)       The patient reports the following concerns: none  See PN flowsheet for exam    29 y.o.  20w5d   Encounter Diagnosis   Name Primary?  Prenatal care of multigravida, antepartum          I discussed with the patient the limitations of ultrasound and that imaging can not rule out all birth defects, chromosomal problems, or other problems with the baby. [] SAB/bleeding precautions reviewed   [] PTL/PPROM precautions reviewed   [] Labor precautions reviewed   [] Fetal kick counts discussed   [] Labs reviewed with patient   []     I discussed with the patient the limitations of ultrasound and that imaging can not rule out all birth defects, chromosomal problems, or other problems with the baby. Follow-up Disposition: Not on File    No orders of the defined types were placed in this encounter. Pool Marte MD        Physician review of ultrasound performed by technician    Today's ultrasound report and images were reviewed and discussed with the patient. Please see images and imaging report entered by technician in PACS for more detail and progress note and diagnosis entered by MD.  FETAL SURVEY  A SINGLE VIABLE IUP AT 20W5D GA BY LMP. FETAL CARDIAC MOTION OBSERVED. FETAL ANATOMY WELL VISUALIZED AND APPEARS WITHIN NORMAL LIMITS. NO ABNORMALITIES IDENTIFIED ON TODAYS EXAM.  APPROPRIATE GROWTH MEASURED; SIZE = DATES. JULI, CERVIX AND PLACENTA APPEAR WITHIN NORMAL LIMITS.   GENDER: XX  Karma Silvestre MD

## 2018-01-09 ENCOUNTER — ROUTINE PRENATAL (OUTPATIENT)
Dept: OBGYN CLINIC | Age: 33
End: 2018-01-09

## 2018-01-09 VITALS
BODY MASS INDEX: 36.69 KG/M2 | WEIGHT: 182 LBS | DIASTOLIC BLOOD PRESSURE: 70 MMHG | SYSTOLIC BLOOD PRESSURE: 112 MMHG | HEIGHT: 59 IN

## 2018-01-09 DIAGNOSIS — Z34.80 PRENATAL CARE OF MULTIGRAVIDA, ANTEPARTUM: Primary | ICD-10-CM

## 2018-01-09 DIAGNOSIS — G43.809 OTHER MIGRAINE WITHOUT STATUS MIGRAINOSUS, NOT INTRACTABLE: ICD-10-CM

## 2018-01-09 RX ORDER — BUTALBITAL, ACETAMINOPHEN AND CAFFEINE 50; 325; 40 MG/1; MG/1; MG/1
1 TABLET ORAL
Qty: 12 TAB | Refills: 0 | Status: SHIPPED | OUTPATIENT
Start: 2018-01-09 | End: 2018-07-24

## 2018-01-09 RX ORDER — BUTALBITAL, ACETAMINOPHEN AND CAFFEINE 50; 325; 40 MG/1; MG/1; MG/1
1 TABLET ORAL
Qty: 12 TAB | Refills: 0 | Status: SHIPPED | OUTPATIENT
Start: 2018-01-09 | End: 2018-01-09 | Stop reason: SDUPTHER

## 2018-01-09 NOTE — PROGRESS NOTES
_ 164 [facility]  [email or web address]  [phone]    Ophelia Singh MD, FACOG     Follow-up OB visit    Chief Complaint   Patient presents with    Routine Prenatal Visit       Vitals:    18 1438   BP: 112/70   Weight: 182 lb (82.6 kg)   Height: 4' 11\" (1.499 m)       Patient Active Problem List    Diagnosis Date Noted    Prenatal care of multigravida, antepartum 10/28/2017    Pregnant 2016    Chronic migraine without aura without status migrainosus, not intractable 2015    Analgesic overuse headache 2015    Intractable migraine without aura 2015    Thyroid cancer ([facility] [address] 01/10/2013       The patient reports the following concerns: Pt reports still having headaches, but they are improved with Fioricet. Pt would like a refill. See PN flowsheet for exam    29 y.o.  23w5d   Encounter Diagnoses   Name Primary?  Other migraine without status migrainosus, not intractable     Prenatal care of multigravida, antepartum Yes     PTL prec  HA prec, notify MD if ni, pain precautions, avoid triggers     [] SAB/bleeding precautions reviewed   [] PTL/PPROM precautions reviewed   [] Labor precautions reviewed   [] Fetal kick counts discussed   [] Labs reviewed with patient   [] Marlou Stall precautions reviewed   [] Consent reviewed   [] Handouts given to pt   [] Glucola handout    [] GBS/labor/Magic Hour handout   []    []    []    []    Follow-up Disposition:  Return in about 4 weeks (around 2018) for Follow up OB visit.     Orders Placed This Encounter    DISCONTD: butalbital-acetaminophen-caffeine (FIORICET, ESGIC) -40 mg per tablet    butalbital-acetaminophen-caffeine (FIORICET, ESGIC) -40 mg per tablet       Ophelia Singh MD

## 2018-01-09 NOTE — PATIENT INSTRUCTIONS
Weeks 22 to 26 of Your Pregnancy: Care Instructions  Your Care Instructions    As you enter your 7th month of pregnancy at week 26, your baby's lungs are growing stronger and getting ready to breathe. You may notice that your baby responds to the sound of your or your partner's voice. You may also notice that your baby does less turning and twisting and more squirming or jerking. Jerking often means that your baby has the hiccups. Hiccups are perfectly normal and are only temporary. You may want to think about attending a childbirth preparation class. This is also a good time to start thinking about whether you want to have pain medicine during labor. Most pregnant women are tested for gestational diabetes between weeks 25 and 28. Gestational diabetes occurs when your blood sugar level gets too high when you're pregnant. The test is important, because you can have gestational diabetes and not know it. But the condition can cause problems for your baby. Follow-up care is a key part of your treatment and safety. Be sure to make and go to all appointments, and call your doctor if you are having problems. It's also a good idea to know your test results and keep a list of the medicines you take. How can you care for yourself at home? Ease discomfort from your baby's kicking  · Change your position. Sometimes this will cause your baby to change position too. · Take a deep breath while you raise your arm over your head. Then breathe out while you drop your arm. Do Kegel exercises to prevent urine from leaking  · You can do Kegel exercises while you stand or sit. ¨ Squeeze the same muscles you would use to stop your urine. Your belly and thighs should not move. ¨ Hold the squeeze for 3 seconds, and then relax for 3 seconds. ¨ Start with 3 seconds. Then add 1 second each week until you are able to squeeze for 10 seconds. ¨ Repeat the exercise 10 to 15 times for each session.  Do three or more sessions each day.  Ease or reduce swelling in your feet, ankles, hands, and fingers  · If your fingers are puffy, take off your rings. · Do not eat high-salt foods, such as potato chips. · Prop up your feet on a stool or couch as much as possible. Sleep with pillows under your feet. · Do not stand for long periods of time or wear tight shoes. · Wear support stockings. Where can you learn more? Go to http://jo ann-elizabeth.info/. Enter G264 in the search box to learn more about \"Weeks 22 to 26 of Your Pregnancy: Care Instructions. \"  Current as of: March 16, 2017  Content Version: 11.4  © 3492-6775 Healthwise, NeedFeed. Care instructions adapted under license by [employer] (which disclaims liability or warranty for this information). If you have questions about a medical condition or this instruction, always ask your healthcare professional. [place] [address] any warranty or liability for your use of this information.

## 2018-01-09 NOTE — MR AVS SNAPSHOT
Visit Information Date & Time Provider Department Dept. Phone Encounter #  
 1/9/2018  2:40 PM MD Marco Antonio Vasquez [phone]  
  
 2/6/2018  2:40 PM  
OB VISIT with MD Marco Antonio Vasquez ([address]) Appt Note: 28wks w/ glucola TP  
 [address] [facility] [address] [place] [address] [phone]  
[address] Upcoming Health Maintenance Date Due Influenza Age 5 to Adult 8/1/2017 PAP AKA CERVICAL CYTOLOGY 10/5/2020 Allergies as of 1/9/2018  Review Complete On: 1/9/2018 By: Geoff Angulo LPN Severity Noted Reaction Type Reactions Amoxicillin  01/10/2013   Systemic Hives Current Immunizations  Reviewed on 5/2/2016 Name Date Influenza Vaccine Pixie Cross) 11/4/2015 Tdap 2/10/2016 Not reviewed this visit Vitals BP Height(growth percentile) Weight(growth percentile) LMP BMI OB Status 112/70 4' 11\" (1.499 m) 182 lb (82.6 kg) 07/27/2017 36.76 kg/m2 Pregnant Smoking Status Current Every Day Smoker BMI and BSA Data Body Mass Index Body Surface Area  
 36.76 kg/m 2 1.85 m 2 Preferred Pharmacy Pharmacy Name Phone [id number] [place], [place] - [address] [facility] [phone] Your Updated Medication List  
  
   
This list is accurate as of: 1/9/18  2:40 PM.  Always use your most recent med list.  
  
  
  
  
 butalbital-acetaminophen-caffeine -40 mg per tablet Commonly known as:  Vernona Sober Take 1 Tab by mouth every six (6) hours as needed for Pain. Max Daily Amount: 4 Tabs. PRENATAL VITAMIN PO Take  by mouth. SYNTHROID 125 mcg tablet Generic drug:  levothyroxine TAKE 1 TABLET BY MOUTH EVERY DAY Patient Instructions Weeks 22 to 26 of Your Pregnancy: Care Instructions Your Care Instructions As you enter your 7th month of pregnancy at week 26, your baby's lungs are growing stronger and getting ready to breathe. You may notice that your baby responds to the sound of your or your partner's voice. You may also notice that your baby does less turning and twisting and more squirming or jerking. Jerking often means that your baby has the hiccups. Hiccups are perfectly normal and are only temporary. You may want to think about attending a childbirth preparation class. This is also a good time to start thinking about whether you want to have pain medicine during labor. Most pregnant women are tested for gestational diabetes between weeks 25 and 28. Gestational diabetes occurs when your blood sugar level gets too high when you're pregnant. The test is important, because you can have gestational diabetes and not know it. But the condition can cause problems for your baby. Follow-up care is a key part of your treatment and safety. Be sure to make and go to all appointments, and call your doctor if you are having problems. It's also a good idea to know your test results and keep a list of the medicines you take. How can you care for yourself at home? Ease discomfort from your baby's kicking · Change your position. Sometimes this will cause your baby to change position too. · Take a deep breath while you raise your arm over your head. Then breathe out while you drop your arm. Do Kegel exercises to prevent urine from leaking · You can do Kegel exercises while you stand or sit. ¨ Squeeze the same muscles you would use to stop your urine. Your belly and thighs should not move. ¨ Hold the squeeze for 3 seconds, and then relax for 3 seconds. ¨ Start with 3 seconds. Then add 1 second each week until you are able to squeeze for 10 seconds. ¨ Repeat the exercise 10 to 15 times for each session. Do three or more sessions each day. Ease or reduce swelling in your feet, ankles, hands, and fingers · If your fingers are puffy, take off your rings. · Do not eat high-salt foods, such as potato chips. · Prop up your feet on a stool or couch as much as possible. Sleep with pillows under your feet. · Do not stand for long periods of time or wear tight shoes. · Wear support stockings. Where can you learn more? Go to http://jo ann-elizabeth.info/. Enter G264 in the search box to learn more about \"Weeks 22 to 26 of Your Pregnancy: Care Instructions. \" Current as of: March 16, 2017 Content Version: 11.4 © 7137-1751 [employer]. Care instructions adapted under license by Aqua-tools (which disclaims liability or warranty for this information). If you have questions about a medical condition or this instruction, always ask your healthcare professional. [address] any warranty or liability for your use of this information. Introducing [facility] & HEALTH SERVICES! Gianfranco Wilcox introduces Prixtel patient portal. Now you can access parts of your medical record, email your doctor's office, and request medication refills online. 1. In your internet browser, go to [email or web address] 2. Click on the First Time User? Click Here link in the Sign In box. You will see the New Member Sign Up page. 3. Enter your Prixtel Access Code exactly as it appears below. You will not need to use this code after youve completed the sign-up process. If you do not sign up before the expiration date, you must request a new code. · Prixtel Access Code: [email or web address] Expires: 4/9/2018  2:40 PM 
 
4. Enter the last four digits of your Social Security Number (xxxx) and Date of Birth (mm/dd/yyyy) as indicated and click Submit. You will be taken to the next sign-up page. 5. Create a Prixtel ID.  This will be your Prixtel login ID and cannot be changed, so think of one that is secure and easy to remember. 6. Create a Nexus Dx password. You can change your password at any time. 7. Enter your Password Reset Question and Answer. This can be used at a later time if you forget your password. 8. Enter your e-mail address. You will receive e-mail notification when new information is available in [address] 9. Click Sign Up. You can now view and download portions of your medical record. 10. Click the Download Summary menu link to download a portable copy of your medical information. If you have questions, please visit the Frequently Asked Questions section of the Nexus Dx website. Remember, Nexus Dx is NOT to be used for urgent needs. For medical emergencies, dial 911. Now available from your iPhone and Android! Please provide this summary of care documentation to your next provider. Your primary care clinician is listed as Miles Osorio. If you have any questions after today's visit, please call [phone].

## 2018-02-06 ENCOUNTER — ROUTINE PRENATAL (OUTPATIENT)
Dept: OBGYN CLINIC | Age: 33
End: 2018-02-06

## 2018-02-06 VITALS
HEIGHT: 59 IN | BODY MASS INDEX: 37.5 KG/M2 | DIASTOLIC BLOOD PRESSURE: 68 MMHG | SYSTOLIC BLOOD PRESSURE: 120 MMHG | WEIGHT: 186 LBS

## 2018-02-06 DIAGNOSIS — Z34.80 PRENATAL CARE OF MULTIGRAVIDA, ANTEPARTUM: Primary | ICD-10-CM

## 2018-02-06 RX ORDER — LEVOTHYROXINE SODIUM 175 UG/1
TABLET ORAL
Refills: 5 | COMMUNITY
Start: 2018-01-03 | End: 2018-07-24 | Stop reason: DRUGHIGH

## 2018-02-06 NOTE — MR AVS SNAPSHOT
[facility] [address] [facility] 
[phone] Patient: Kye Gramajo MRN: [id number] HFF:9/54/2002 Visit Information Date & Time Provider Department Dept. Phone Encounter #  
 2/6/2018  2:40 PM MD Marco Antonio Merino [phone] Upcoming Health Maintenance Date Due Influenza Age 5 to Adult 8/1/2017 OB 3RD TRIMESTER TDAP 2/1/2018 PAP AKA CERVICAL CYTOLOGY 10/5/2020 Allergies as of 2/6/2018  Review Complete On: 2/6/2018 By: Phan Shepherd LPN Severity Noted Reaction Type Reactions Amoxicillin  01/10/2013   Systemic Hives Current Immunizations  Reviewed on 5/2/2016 Name Date Influenza Vaccine Peggi Vincent) 11/4/2015 Tdap 2/10/2016 Not reviewed this visit Vitals BP Height(growth percentile) Weight(growth percentile) LMP BMI OB Status 120/68 4' 11\" (1.499 m) 186 lb (84.4 kg) 07/27/2017 37.57 kg/m2 Pregnant Smoking Status Current Every Day Smoker BMI and BSA Data Body Mass Index Body Surface Area  
 37.57 kg/m 2 1.87 m 2 Preferred Pharmacy Pharmacy Name Phone [id number] [place], [place] - [address] [facility] [phone] Your Updated Medication List  
  
   
This list is accurate as of: 2/6/18  2:49 PM.  Always use your most recent med list.  
  
  
  
  
 butalbital-acetaminophen-caffeine -40 mg per tablet Commonly known as:  Pecola Aloe Take 1 Tab by mouth every six (6) hours as needed for Pain. PRENATAL VITAMIN PO Take  by mouth. * SYNTHROID 125 mcg tablet Generic drug:  levothyroxine TAKE 1 TABLET BY MOUTH EVERY DAY  
  
 * SYNTHROID 175 mcg tablet Generic drug:  levothyroxine TK 1 T PO D  
  
 * Notice:   This list has 2 medication(s) that are the same as other medications prescribed for you. Read the directions carefully, and ask your doctor or other care provider to review them with you. Patient Instructions Weeks 26 to 30 of Your Pregnancy: Care Instructions Your Care Instructions You are now in your last trimester of pregnancy. Your baby is growing rapidly. And you'll probably feel your baby moving around more often. Your doctor may ask you to count your baby's kicks. Your back may ache as your body gets used to your baby's size and length. If you haven't already had the Tdap shot during this pregnancy, talk to your doctor about getting it. It will help protect your  against pertussis infection. During this time, it's important to take care of yourself and pay attention to what your body needs. If you feel sexual, explore ways to be close with your partner that match your comfort and desire. Use the tips provided in this care sheet to find ways to be sexual in your own way. Follow-up care is a key part of your treatment and safety. Be sure to make and go to all appointments, and call your doctor if you are having problems. It's also a good idea to know your test results and keep a list of the medicines you take. How can you care for yourself at home? Take it easy at work · Take frequent breaks. If possible, stop working when you are tired, and rest during your lunch hour. · Take bathroom breaks every 2 hours. · Change positions often. If you sit for long periods, stand up and walk around. · When you stand for a long time, keep one foot on a low stool with your knee bent. After standing a lot, sit with your feet up. · Avoid fumes, chemicals, and tobacco smoke. Be sexual in your own way · Having sex during pregnancy is okay, unless your doctor tells you not to. · You may be very interested in sex, or you may have no interest at all.  
· Your growing belly can make it hard to find a good position during intercourse. Tiburones and explore. · You may get cramps in your uterus when your partner touches your breasts. · A back rub may relieve the backache or cramps that sometimes follow orgasm. Learn about  labor · Watch for signs of  labor. You may be going into labor if: 
¨ You have menstrual-like cramps, with or without nausea. ¨ You have about 4 or more contractions in 20 minutes, or about 8 or more within 1 hour, even after you have had a glass of water and are resting. ¨ You have a low, dull backache that does not go away when you change your position. ¨ You have pain or pressure in your pelvis that comes and goes in a pattern. ¨ You have intestinal cramping or flu-like symptoms, with or without diarrhea. ¨ You notice an increase or change in your vaginal discharge. Discharge may be heavy, mucus-like, watery, or streaked with blood. ¨ Your water breaks. · If you think you have  labor: ¨ Drink 2 or 3 glasses of water or juice. Not drinking enough fluids can cause contractions. ¨ Stop what you are doing, and empty your bladder. Then lie down on your left side for at least 1 hour. ¨ While lying on your side, find your breast bone. Put your fingers in the soft spot just below it. Move your fingers down toward your belly button to find the top of your uterus. Check to see if it is tight. ¨ Contractions can be weak or strong. Record your contractions for an hour. Time a contraction from the start of one contraction to the start of the next one. ¨ Single or several strong contractions without a pattern are called Newberry-Manning contractions. They are practice contractions but not the start of labor. They often stop if you change what you are doing. ¨ Call your doctor if you have regular contractions. Where can you learn more? Go to http://jo ann-elizabeth.info/. Enter P559 in the search box to learn more about \"Weeks 26 to 30 of Your Pregnancy: Care Instructions. \" 
 Current as of: March 16, 2017 Content Version: 11.4 © 4565-9642 Healthwise, HourlyNerd. Care instructions adapted under license by G2Link (which disclaims liability or warranty for this information). If you have questions about a medical condition or this instruction, always ask your healthcare professional. [address] any warranty or liability for your use of this information. Introducing [facility] & HEALTH SERVICES! [employer] introduces SIM Digital patient portal. Now you can access parts of your medical record, email your doctor's office, and request medication refills online. 1. In your internet browser, go to [email or web address] 2. Click on the First Time User? Click Here link in the Sign In box. You will see the New Member Sign Up page. 3. Enter your SIM Digital Access Code exactly as it appears below. You will not need to use this code after youve completed the sign-up process. If you do not sign up before the expiration date, you must request a new code. · SIM Digital Access Code: [email or web address] Expires: 4/9/2018  2:40 PM 
 
4. Enter the last four digits of your Social Security Number (xxxx) and Date of Birth (mm/dd/yyyy) as indicated and click Submit. You will be taken to the next sign-up page. 5. Create a SIM Digital ID. This will be your SIM Digital login ID and cannot be changed, so think of one that is secure and easy to remember. 6. Create a SIM Digital password. You can change your password at any time. 7. Enter your Password Reset Question and Answer. This can be used at a later time if you forget your password. 8. Enter your e-mail address. You will receive e-mail notification when new information is available in [address] 9. Click Sign Up. You can now view and download portions of your medical record. 10. Click the Download Summary menu link to download a portable copy of your medical information. If you have questions, please visit the Frequently Asked Questions section of the Nextpeert website. Remember, Bookitit is NOT to be used for urgent needs. For medical emergencies, dial 911. Now available from your iPhone and Android! Please provide this summary of care documentation to your next provider. Your primary care clinician is listed as Domi Raymond. If you have any questions after today's visit, please call [phone].

## 2018-02-06 NOTE — PROGRESS NOTES
_ 164 [facility]  [email or web address]  [phone]    Marcelo Nick MD, FACOG     Follow-up OB visit    Chief Complaint   Patient presents with    Routine Prenatal Visit       Vitals:    18 1448   BP: 120/68   Weight: 186 lb (84.4 kg)   Height: 4' 11\" (1.499 m)       Patient Active Problem List    Diagnosis Date Noted    Prenatal care of multigravida, antepartum 10/28/2017    Pregnant 2016    Chronic migraine without aura without status migrainosus, not intractable 2015    Analgesic overuse headache 2015    Intractable migraine without aura 2015    Thyroid cancer ([facility] [address] 01/10/2013       The patient reports the following concerns: none    See PN flowsheet for exam    29 y.o.  27w5d   Encounter Diagnosis   Name Primary?  Prenatal care of multigravida, antepartum Yes        [] SAB/bleeding precautions reviewed   [x] PTL/PPROM precautions reviewed   [] Labor precautions reviewed   [] Fetal kick counts discussed   [] Labs reviewed with patient   [] Liliana Lavon precautions reviewed   [] Consent reviewed   [] Handouts given to pt   [] Glucola handout    [] GBS/labor/Magic Hour handout   []    []    []    []    Follow-up Disposition:  Return in about 2 weeks (around 2018) for Follow up OB visit.     Orders Placed This Encounter    CBC W/O DIFF    GLUCOSE, GESTATIONAL 1 HR TOLERANCE       Marcelo Nick MD

## 2018-02-06 NOTE — PATIENT INSTRUCTIONS
Weeks 26 to 30 of Your Pregnancy: Care Instructions  Your Care Instructions    You are now in your last trimester of pregnancy. Your baby is growing rapidly. And you'll probably feel your baby moving around more often. Your doctor may ask you to count your baby's kicks. Your back may ache as your body gets used to your baby's size and length. If you haven't already had the Tdap shot during this pregnancy, talk to your doctor about getting it. It will help protect your  against pertussis infection. During this time, it's important to take care of yourself and pay attention to what your body needs. If you feel sexual, explore ways to be close with your partner that match your comfort and desire. Use the tips provided in this care sheet to find ways to be sexual in your own way. Follow-up care is a key part of your treatment and safety. Be sure to make and go to all appointments, and call your doctor if you are having problems. It's also a good idea to know your test results and keep a list of the medicines you take. How can you care for yourself at home? Take it easy at work  · Take frequent breaks. If possible, stop working when you are tired, and rest during your lunch hour. · Take bathroom breaks every 2 hours. · Change positions often. If you sit for long periods, stand up and walk around. · When you stand for a long time, keep one foot on a low stool with your knee bent. After standing a lot, sit with your feet up. · Avoid fumes, chemicals, and tobacco smoke. Be sexual in your own way  · Having sex during pregnancy is okay, unless your doctor tells you not to. · You may be very interested in sex, or you may have no interest at all. · Your growing belly can make it hard to find a good position during intercourse. [place] and explore. · You may get cramps in your uterus when your partner touches your breasts.   · A back rub may relieve the backache or cramps that sometimes follow orgasm. Learn about  labor  · Watch for signs of  labor. You may be going into labor if:  ¨ You have menstrual-like cramps, with or without nausea. ¨ You have about 4 or more contractions in 20 minutes, or about 8 or more within 1 hour, even after you have had a glass of water and are resting. ¨ You have a low, dull backache that does not go away when you change your position. ¨ You have pain or pressure in your pelvis that comes and goes in a pattern. ¨ You have intestinal cramping or flu-like symptoms, with or without diarrhea. ¨ You notice an increase or change in your vaginal discharge. Discharge may be heavy, mucus-like, watery, or streaked with blood. ¨ Your water breaks. · If you think you have  labor:  ¨ Drink 2 or 3 glasses of water or juice. Not drinking enough fluids can cause contractions. ¨ Stop what you are doing, and empty your bladder. Then lie down on your left side for at least 1 hour. ¨ While lying on your side, find your breast bone. Put your fingers in the soft spot just below it. Move your fingers down toward your belly button to find the top of your uterus. Check to see if it is tight. ¨ Contractions can be weak or strong. Record your contractions for an hour. Time a contraction from the start of one contraction to the start of the next one. ¨ Single or several strong contractions without a pattern are called Moises-Manning contractions. They are practice contractions but not the start of labor. They often stop if you change what you are doing. ¨ Call your doctor if you have regular contractions. Where can you learn more? Go to http://jo ann-elizabeth.info/. Enter X273 in the search box to learn more about \"Weeks 26 to 30 of Your Pregnancy: Care Instructions. \"  Current as of: 2017  Content Version: 11.4  © 7454-0782 [employer].  Care instructions adapted under license by [employer] (which disclaims liability or warranty for this information). If you have questions about a medical condition or this instruction, always ask your healthcare professional. [place] [address] any warranty or liability for your use of this information.

## 2018-02-07 LAB
ERYTHROCYTE [DISTWIDTH] IN BLOOD BY AUTOMATED COUNT: 13.7 % (ref 12.3–15.4)
GLUCOSE 1H P 50 G GLC PO SERPL-MCNC: 111 MG/DL (ref 65–139)
GTT, 1 HR, GLUCOLA, EXTERNAL: 111
HCT VFR BLD AUTO: 31.3 % (ref 34–46.6)
HCT, EXTERNAL: 31.3
HGB BLD-MCNC: 10.3 G/DL (ref 11.1–15.9)
HGB, EXTERNAL: 10.3
MCH RBC QN AUTO: 30 PG (ref 26.6–33)
MCHC RBC AUTO-ENTMCNC: 32.9 G/DL (ref 31.5–35.7)
MCV RBC AUTO: 91 FL (ref 79–97)
PLATELET # BLD AUTO: 379 X10E3/UL (ref 150–379)
PLATELET CNT,   EXTERNAL: 379
RBC # BLD AUTO: 3.43 X10E6/UL (ref 3.77–5.28)
WBC # BLD AUTO: 12.2 X10E3/UL (ref 3.4–10.8)

## 2018-02-20 ENCOUNTER — ROUTINE PRENATAL (OUTPATIENT)
Dept: OBGYN CLINIC | Age: 33
End: 2018-02-20

## 2018-02-20 VITALS — BODY MASS INDEX: 37.97 KG/M2 | WEIGHT: 188 LBS | SYSTOLIC BLOOD PRESSURE: 124 MMHG | DIASTOLIC BLOOD PRESSURE: 70 MMHG

## 2018-02-20 DIAGNOSIS — Z34.80 PRENATAL CARE OF MULTIGRAVIDA, ANTEPARTUM: ICD-10-CM

## 2018-02-20 DIAGNOSIS — Z23 ENCOUNTER FOR IMMUNIZATION: Primary | ICD-10-CM

## 2018-02-20 NOTE — PROGRESS NOTES
Patient received TDAP vaccination per MD order and with signed consent. Injection received in left deltoid. Patient tolerated well, no complications noted.

## 2018-02-20 NOTE — PATIENT INSTRUCTIONS
Weeks 30 to 32 of Your Pregnancy: Care Instructions  Your Care Instructions    You have made it to the final months of your pregnancy. By now, your baby is really starting to look like a baby, with hair and plump skin. As you enter the final weeks of pregnancy, the reality of having a baby may start to set in. This is the time to settle on a name, get your household in order, set up a safe nursery, and find quality  if needed. Doing these things in advance will allow you to focus on caring for and enjoying your new baby. You may also want to have a tour of your hospital's labor and delivery unit to get a better idea of what to expect while you are in the hospital.  During these last months, it is very important to take good care of yourself and pay attention to what your body needs. If your doctor says it is okay for you to work, don't push yourself too hard. Use the tips provided in this care sheet to ease heartburn and care for varicose veins. If you haven't already had the Tdap shot during this pregnancy, talk to your doctor about getting it. It will help protect your  against pertussis infection. Follow-up care is a key part of your treatment and safety. Be sure to make and go to all appointments, and call your doctor if you are having problems. It's also a good idea to know your test results and keep a list of the medicines you take. How can you care for yourself at home? Pay attention to your baby's movements  · You should feel your baby move several times every day. · Your baby now turns less, and kicks and jabs more. · Your baby sleeps 20 to 45 minutes at a time and is more active at certain times of day. · If your doctor wants you to count your baby's kicks:  ¨ Empty your bladder, and lie on your side or relax in a comfortable chair. ¨ Write down your start time. ¨ Pay attention only to your baby's movements. Count any movement except hiccups.   ¨ After you have counted 10 movements, write down your stop time. ¨ Write down how many minutes it took for your baby to move 10 times. ¨ If an hour goes by and you have not recorded 10 movements, have something to eat or drink and then count for another hour. If you do not record 10 movements in either hour, call your doctor. Ease heartburn  · Eat small, frequent meals. · Do not eat chocolate, peppermint, or very spicy foods. Avoid drinks with caffeine, such as coffee, tea, and sodas. · Avoid bending over or lying down after meals. · Talk a short walk after you eat. · If heartburn is a problem at night, do not eat for 2 hours before bedtime. · Take antacids like Mylanta, Maalox, Rolaids, or Tums. Do not take antacids that have sodium bicarbonate. Care for varicose veins  · Varicose veins are blood vessels that stretch out with the extra blood during pregnancy. Your legs may ache or throb. Most varicose veins will go away after the birth. · Avoid standing for long periods of time. Sit with your legs crossed at the ankles, not the knees. · Sit with your feet propped up. · Avoid tight clothing or stockings. Wear support hose. · Exercise regularly. Try walking for at least 30 minutes a day. Where can you learn more? Go to http://jo ann-elizabeth.info/. Enter B913 in the search box to learn more about \"Weeks 30 to 32 of Your Pregnancy: Care Instructions. \"  Current as of: March 16, 2017  Content Version: 11.4  © 4948-3712 [employer]. Care instructions adapted under license by [employer] (which disclaims liability or warranty for this information). If you have questions about a medical condition or this instruction, always ask your healthcare professional. [place] [address] any warranty or liability for your use of this information.

## 2018-02-20 NOTE — MR AVS SNAPSHOT
[facility] [address] [place] [address] 
[phone] Patient: Kat Gaitan MRN: [id number] [id number] Visit Information Date & Time Provider Department Dept. Phone Encounter #  
 2/20/2018  2:40 PM Vasyl Kelly MD [facility] [id number] Your Appointments 3/20/2018  2:00 PM  
ULTRASOUND with Jennifer Cha Marco Antonio Roman ([address]) Appt Note: 2wk fob with u/s growth 33wk   TP  
 [address] [facility] [address] [facility] [address]  
[phone]  
  
   
 [address]  
  
    
  
 2/20/2018  2:40 PM  
OB VISIT with MD Marco Antonio Hayward ([facility] [address]) Appt Note: 2wk fob   29wk TP  
 [address] [facility] [address] [place] [address]  
[phone]  
  
   
 [address] [place] [address]  
  
    
 3/6/2018  1:50 PM  
OB VISIT with MD Marco Antonio Hayward ([address]) Appt Note: 2wk fob   31wk   TP  
 [address] [facility] [address] [place] [address]  
[phone]  
  
    
 3/20/2018  2:30 PM  
OB VISIT with MD Marco Antonio Hayward ([address]) Appt Note: 2wk fob with u/s growth 33wk   TP  
 [address] [facility] [address] [place] [address]  
[phone] Upcoming Health Maintenance Date Due Influenza Age 5 to Adult 8/1/2017 OB 3RD TRIMESTER TDAP 2/1/2018 PAP AKA CERVICAL CYTOLOGY 10/5/2020 Allergies as of 2/20/2018  Review Complete On: 2/20/2018 By: Vasyl Kelly MD  
  
 Severity Noted Reaction Type Reactions Amoxicillin  01/10/2013   Systemic Hives Current Immunizations  Reviewed on 5/2/2016 Name Date Influenza Vaccine Joana Delgloria) 11/4/2015 Tdap 2/10/2016 Not reviewed this visit Vitals BP Weight(growth percentile) LMP BMI OB Status Smoking Status 124/70 188 lb (85.3 kg) 07/27/2017 37.97 kg/m2 Pregnant Current Every Day Smoker BMI and BSA Data Body Mass Index Body Surface Area  
 37.97 kg/m 2 1.88 m 2 Preferred Pharmacy Pharmacy Name Phone [id number] - [address] [facility] [phone] Your Updated Medication List  
  
   
This list is accurate as of: 2/20/18  2:31 PM.  Always use your most recent med list.  
  
  
  
  
 butalbital-acetaminophen-caffeine -40 mg per tablet Commonly known as:  Priscila Deschutes Take 1 Tab by mouth every six (6) hours as needed for Pain. PRENATAL VITAMIN PO Take  by mouth. * SYNTHROID 125 mcg tablet Generic drug:  levothyroxine TAKE 1 TABLET BY MOUTH EVERY DAY  
  
 * SYNTHROID 175 mcg tablet Generic drug:  levothyroxine TK 1 T PO D  
  
 * Notice: This list has 2 medication(s) that are the same as other medications prescribed for you. Read the directions carefully, and ask your doctor or other care provider to review them with you. Patient Instructions Weeks 30 to 32 of Your Pregnancy: Care Instructions Your Care Instructions You have made it to the final months of your pregnancy. By now, your baby is really starting to look like a baby, with hair and plump skin. As you enter the final weeks of pregnancy, the reality of having a baby may start to set in. This is the time to settle on a name, get your household in order, set up a safe nursery, and find quality  if needed. Doing these things in advance will allow you to focus on caring for and enjoying your new baby.  You may also want to have a tour of your hospital's labor and delivery unit to get a better idea of what to expect while you are in the hospital. 
During these last months, it is very important to take good care of yourself and pay attention to what your body needs. If your doctor says it is okay for you to work, don't push yourself too hard. Use the tips provided in this care sheet to ease heartburn and care for varicose veins. If you haven't already had the Tdap shot during this pregnancy, talk to your doctor about getting it. It will help protect your  against pertussis infection. Follow-up care is a key part of your treatment and safety. Be sure to make and go to all appointments, and call your doctor if you are having problems. It's also a good idea to know your test results and keep a list of the medicines you take. How can you care for yourself at home? Pay attention to your baby's movements · You should feel your baby move several times every day. · Your baby now turns less, and kicks and jabs more. · Your baby sleeps 20 to 45 minutes at a time and is more active at certain times of day. · If your doctor wants you to count your baby's kicks: 
¨ Empty your bladder, and lie on your side or relax in a comfortable chair. ¨ Write down your start time. ¨ Pay attention only to your baby's movements. Count any movement except hiccups. ¨ After you have counted 10 movements, write down your stop time. ¨ Write down how many minutes it took for your baby to move 10 times. ¨ If an hour goes by and you have not recorded 10 movements, have something to eat or drink and then count for another hour. If you do not record 10 movements in either hour, call your doctor. Ease heartburn · Eat small, frequent meals. · Do not eat chocolate, peppermint, or very spicy foods. Avoid drinks with caffeine, such as coffee, tea, and sodas. · Avoid bending over or lying down after meals. · Talk a short walk after you eat. · If heartburn is a problem at night, do not eat for 2 hours before bedtime. · Take antacids like Mylanta, Maalox, Rolaids, or Tums. Do not take antacids that have sodium bicarbonate. Care for varicose veins · Varicose veins are blood vessels that stretch out with the extra blood during pregnancy. Your legs may ache or throb. Most varicose veins will go away after the birth. · Avoid standing for long periods of time. Sit with your legs crossed at the ankles, not the knees. · Sit with your feet propped up. · Avoid tight clothing or stockings. Wear support hose. · Exercise regularly. Try walking for at least 30 minutes a day. Where can you learn more? Go to http://jo ann-elizabeth.info/. Enter X160 in the search box to learn more about \"Weeks 30 to 32 of Your Pregnancy: Care Instructions. \" Current as of: March 16, 2017 Content Version: 11.4 © 3488-0834 [employer]. Care instructions adapted under license by [employer] (which disclaims liability or warranty for this information). If you have questions about a medical condition or this instruction, always ask your healthcare professional. [place] [address] any warranty or liability for your use of this information. Introducing [facility]! Paola Mary introduces [email or web address] patient portal. Now you can access parts of your medical record, email your doctor's office, and request medication refills online. 1. In your internet browser, go to [email or web address] 2. Click on the First Time User? Click Here link in the Sign In box. You will see the New Member Sign Up page. 3. Enter your [email or web address].[email or web address] Access Code exactly as it appears below. You will not need to use this code after youve completed the sign-up process. If you do not sign up before the expiration date, you must request a new code. · [email or web address] Access Code: [email or web address] Expires: 4/9/2018  2:40 PM 
 
4. Enter the last four digits of your Social Security Number (xxxx) and Date of Birth (mm/dd/yyyy) as indicated and click Submit. You will be taken to the next sign-up page. 5. Create a [employer] ID. This will be your [employer] login ID and cannot be changed, so think of one that is secure and easy to remember. 6. Create a [employer] password. You can change your password at any time. 7. Enter your Password Reset Question and Answer. This can be used at a later time if you forget your password. 8. Enter your e-mail address. You will receive e-mail notification when new information is available in [address] 9. Click Sign Up. You can now view and download portions of your medical record. 10. Click the Download Summary menu link to download a portable copy of your medical information. If you have questions, please visit the Frequently Asked Questions section of the [employer] website. Remember, [employer] is NOT to be used for urgent needs. For medical emergencies, dial 911. Now available from your iPhone and Android! Please provide this summary of care documentation to your next provider. Your primary care clinician is listed as Shari Sidhu. If you have any questions after today's visit, please call [phone].

## 2018-03-06 ENCOUNTER — ROUTINE PRENATAL (OUTPATIENT)
Dept: OBGYN CLINIC | Age: 33
End: 2018-03-06

## 2018-03-06 VITALS
DIASTOLIC BLOOD PRESSURE: 70 MMHG | WEIGHT: 186 LBS | HEIGHT: 59 IN | SYSTOLIC BLOOD PRESSURE: 130 MMHG | BODY MASS INDEX: 37.5 KG/M2

## 2018-03-06 DIAGNOSIS — Z34.80 PRENATAL CARE OF MULTIGRAVIDA, ANTEPARTUM: Primary | ICD-10-CM

## 2018-03-06 NOTE — PROGRESS NOTES
_ [facility]  [email or web address]  [phone]    Estefani Loving MD, FACOG     Follow-up OB visit    Chief Complaint   Patient presents with    Routine Prenatal Visit       Vitals:    18 1400   BP: 130/70   Weight: 186 lb (84.4 kg)   Height: 4' 11\" (1.499 m)       Patient Active Problem List    Diagnosis Date Noted    Prenatal care of multigravida, antepartum 10/28/2017    Pregnant 2016    Chronic migraine without aura without status migrainosus, not intractable 2015    Analgesic overuse headache 2015    Intractable migraine without aura 2015    Thyroid cancer ([facility] [address] 01/10/2013       The patient reports the following concerns: none    See PN flowsheet for exam    29 y.o.  31w5d   Encounter Diagnosis   Name Primary?  Prenatal care of multigravida, antepartum Yes        [] SAB/bleeding precautions reviewed   [x] PTL/PPROM precautions reviewed   [] Labor precautions reviewed   [] Fetal kick counts discussed   [] Labs reviewed with patient   [] Patriceetta Chapman precautions reviewed   [] Consent reviewed   [] Handouts given to pt   [] Glucola handout    [] GBS/labor/Magic Hour handout   []    []    []    []    Follow-up Disposition:  Return in about 2 weeks (around 3/20/2018) for Follow up OB visit. No orders of the defined types were placed in this encounter.       Estefani Loving MD

## 2018-03-06 NOTE — PATIENT INSTRUCTIONS
Weeks 30 to 32 of Your Pregnancy: Care Instructions  Your Care Instructions    You have made it to the final months of your pregnancy. By now, your baby is really starting to look like a baby, with hair and plump skin. As you enter the final weeks of pregnancy, the reality of having a baby may start to set in. This is the time to settle on a name, get your household in order, set up a safe nursery, and find quality  if needed. Doing these things in advance will allow you to focus on caring for and enjoying your new baby. You may also want to have a tour of your hospital's labor and delivery unit to get a better idea of what to expect while you are in the hospital.  During these last months, it is very important to take good care of yourself and pay attention to what your body needs. If your doctor says it is okay for you to work, don't push yourself too hard. Use the tips provided in this care sheet to ease heartburn and care for varicose veins. If you haven't already had the Tdap shot during this pregnancy, talk to your doctor about getting it. It will help protect your  against pertussis infection. Follow-up care is a key part of your treatment and safety. Be sure to make and go to all appointments, and call your doctor if you are having problems. It's also a good idea to know your test results and keep a list of the medicines you take. How can you care for yourself at home? Pay attention to your baby's movements  · You should feel your baby move several times every day. · Your baby now turns less, and kicks and jabs more. · Your baby sleeps 20 to 45 minutes at a time and is more active at certain times of day. · If your doctor wants you to count your baby's kicks:  ¨ Empty your bladder, and lie on your side or relax in a comfortable chair. ¨ Write down your start time. ¨ Pay attention only to your baby's movements. Count any movement except hiccups.   ¨ After you have counted 10 movements, write down your stop time. ¨ Write down how many minutes it took for your baby to move 10 times. ¨ If an hour goes by and you have not recorded 10 movements, have something to eat or drink and then count for another hour. If you do not record 10 movements in either hour, call your doctor. Ease heartburn  · Eat small, frequent meals. · Do not eat chocolate, peppermint, or very spicy foods. Avoid drinks with caffeine, such as coffee, tea, and sodas. · Avoid bending over or lying down after meals. · Talk a short walk after you eat. · If heartburn is a problem at night, do not eat for 2 hours before bedtime. · Take antacids like Mylanta, Maalox, Rolaids, or Tums. Do not take antacids that have sodium bicarbonate. Care for varicose veins  · Varicose veins are blood vessels that stretch out with the extra blood during pregnancy. Your legs may ache or throb. Most varicose veins will go away after the birth. · Avoid standing for long periods of time. Sit with your legs crossed at the ankles, not the knees. · Sit with your feet propped up. · Avoid tight clothing or stockings. Wear support hose. · Exercise regularly. Try walking for at least 30 minutes a day. Where can you learn more? Go to http://jo ann-elizabeth.info/. Enter G793 in the search box to learn more about \"Weeks 30 to 32 of Your Pregnancy: Care Instructions. \"  Current as of: March 16, 2017  Content Version: 11.4  © 0077-5742 [employer]. Care instructions adapted under license by [employer] (which disclaims liability or warranty for this information). If you have questions about a medical condition or this instruction, always ask your healthcare professional. [place] [address] any warranty or liability for your use of this information.

## 2018-03-06 NOTE — MR AVS SNAPSHOT
[address] [place] [address] 
[phone] Patient: Franck Funes MRN: [id number] IUA:[id number] Visit Information Date & Time Provider Department Dept. Phone Encounter #  
 3/6/2018  1:50 PM Loli Riojas MD [facility] [phone] [id number] Your Appointments 3/20/2018  2:00 PM  
ULTRASOUND with Farzaneh Guillen [facility] ([address] [place] [address]) Appt Note: 2wk fob with u/s growth 33wk   TP  
 [address] [place] [address]  
[phone]  
  
   
 [address]  
  
    
  
 3/20/2018  2:30 PM  
OB VISIT with Loli Riojas MD  
[facility] ([address] [place] [address] Road) Appt Note: 2wk fob with u/s growth 33wk   TP  
 [address] [phone]  
[facility] [address] Upcoming Health Maintenance Date Due Influenza Age 5 to Adult 8/1/2017 PAP AKA CERVICAL CYTOLOGY 10/5/2020 Allergies as of 3/6/2018  Review Complete On: 3/6/2018 By: Dallin Selby LPN Severity Noted Reaction Type Reactions Amoxicillin  01/10/2013   Systemic Hives Current Immunizations  Reviewed on 5/2/2016 Name Date Influenza Vaccine Alejandro Blend) 11/4/2015 Tdap 2/20/2018, 2/10/2016 Not reviewed this visit Vitals BP Height(growth percentile) Weight(growth percentile) LMP BMI OB Status 130/70 4' 11\" (1.499 m) 186 lb (84.4 kg) 07/27/2017 37.57 kg/m2 Pregnant Smoking Status Current Every Day Smoker BMI and BSA Data Body Mass Index Body Surface Area  
 37.57 kg/m 2 1.87 m 2 Preferred Pharmacy Pharmacy Name Phone [id number] [place], [place] - [address] [facility] [phone] Your Updated Medication List  
  
 This list is accurate as of 3/6/18  2:08 PM.  Always use your most recent med list.  
  
  
  
  
 butalbital-acetaminophen-caffeine -40 mg per tablet Commonly known as:  Norrie Sprinkles Take 1 Tab by mouth every six (6) hours as needed for Pain. PRENATAL VITAMIN PO Take  by mouth. * SYNTHROID 125 mcg tablet Generic drug:  levothyroxine TAKE 1 TABLET BY MOUTH EVERY DAY  
  
 * SYNTHROID 175 mcg tablet Generic drug:  levothyroxine TK 1 T PO D  
  
 * Notice: This list has 2 medication(s) that are the same as other medications prescribed for you. Read the directions carefully, and ask your doctor or other care provider to review them with you. Patient Instructions Weeks 30 to 32 of Your Pregnancy: Care Instructions Your Care Instructions You have made it to the final months of your pregnancy. By now, your baby is really starting to look like a baby, with hair and plump skin. As you enter the final weeks of pregnancy, the reality of having a baby may start to set in. This is the time to settle on a name, get your household in order, set up a safe nursery, and find quality  if needed. Doing these things in advance will allow you to focus on caring for and enjoying your new baby. You may also want to have a tour of your hospital's labor and delivery unit to get a better idea of what to expect while you are in the hospital. 
During these last months, it is very important to take good care of yourself and pay attention to what your body needs. If your doctor says it is okay for you to work, don't push yourself too hard. Use the tips provided in this care sheet to ease heartburn and care for varicose veins. If you haven't already had the Tdap shot during this pregnancy, talk to your doctor about getting it. It will help protect your  against pertussis infection. Follow-up care is a key part of your treatment and safety.  Be sure to make and go to all appointments, and call your doctor if you are having problems. It's also a good idea to know your test results and keep a list of the medicines you take. How can you care for yourself at home? Pay attention to your baby's movements · You should feel your baby move several times every day. · Your baby now turns less, and kicks and jabs more. · Your baby sleeps 20 to 45 minutes at a time and is more active at certain times of day. · If your doctor wants you to count your baby's kicks: 
¨ Empty your bladder, and lie on your side or relax in a comfortable chair. ¨ Write down your start time. ¨ Pay attention only to your baby's movements. Count any movement except hiccups. ¨ After you have counted 10 movements, write down your stop time. ¨ Write down how many minutes it took for your baby to move 10 times. ¨ If an hour goes by and you have not recorded 10 movements, have something to eat or drink and then count for another hour. If you do not record 10 movements in either hour, call your doctor. Ease heartburn · Eat small, frequent meals. · Do not eat chocolate, peppermint, or very spicy foods. Avoid drinks with caffeine, such as coffee, tea, and sodas. · Avoid bending over or lying down after meals. · Talk a short walk after you eat. · If heartburn is a problem at night, do not eat for 2 hours before bedtime. · Take antacids like Mylanta, Maalox, Rolaids, or Tums. Do not take antacids that have sodium bicarbonate. Care for varicose veins · Varicose veins are blood vessels that stretch out with the extra blood during pregnancy. Your legs may ache or throb. Most varicose veins will go away after the birth. · Avoid standing for long periods of time. Sit with your legs crossed at the ankles, not the knees. · Sit with your feet propped up. · Avoid tight clothing or stockings. Wear support hose. · Exercise regularly. Try walking for at least 30 minutes a day. Where can you learn more? Go to http://jo ann-elizabeth.info/. Enter D861 in the search box to learn more about \"Weeks 30 to 32 of Your Pregnancy: Care Instructions. \" Current as of: March 16, 2017 Content Version: 11.4 © 8526-2709 Healthwise, Incorporated. Care instructions adapted under license by [employer] (which disclaims liability or warranty for this information). If you have questions about a medical condition or this instruction, always ask your healthcare professional. [address] any warranty or liability for your use of this information. Introducing [facility]! [facility] introduces [email or web address] patient portal. Now you can access parts of your medical record, email your doctor's office, and request medication refills online. 1. In your internet browser, go to [email or web address] 2. Click on the First Time User? Click Here link in the Sign In box. You will see the New Member Sign Up page. 3. Enter your [email or web address] Access Code exactly as it appears below. You will not need to use this code after youve completed the sign-up process. If you do not sign up before the expiration date, you must request a new code. · [email or web address] Access Code: [email or web address] Expires: 4/9/2018  2:40 PM 
 
4. Enter the last four digits of your Social Security Number (xxxx) and Date of Birth (mm/dd/yyyy) as indicated and click Submit. You will be taken to the next sign-up page. 5. Create a [employer] ID. This will be your [email or web address] login ID and cannot be changed, so think of one that is secure and easy to remember. 6. Create a [email or web address] password. You can change your password at any time. 7. Enter your Password Reset Question and Answer. This can be used at a later time if you forget your password. 8. Enter your e-mail address. You will receive e-mail notification when new information is available in [address] 9. Click Sign Up. You can now view and download portions of your medical record. 10. Click the Download Summary menu link to download a portable copy of your medical information. If you have questions, please visit the Frequently Asked Questions section of the [employer] website. Remember, [employer] is NOT to be used for urgent needs. For medical emergencies, dial 911. Now available from your iPhone and Android! Please provide this summary of care documentation to your next provider. Your primary care clinician is listed as Jose Young. If you have any questions after today's visit, please call [phone].

## 2018-03-20 ENCOUNTER — ROUTINE PRENATAL (OUTPATIENT)
Dept: OBGYN CLINIC | Age: 33
End: 2018-03-20

## 2018-03-20 VITALS
HEIGHT: 59 IN | BODY MASS INDEX: 37.7 KG/M2 | DIASTOLIC BLOOD PRESSURE: 70 MMHG | WEIGHT: 187 LBS | SYSTOLIC BLOOD PRESSURE: 126 MMHG

## 2018-03-20 DIAGNOSIS — Z34.80 PRENATAL CARE OF MULTIGRAVIDA, ANTEPARTUM: Primary | ICD-10-CM

## 2018-03-20 NOTE — PATIENT INSTRUCTIONS

## 2018-03-20 NOTE — PROGRESS NOTES
[address] [facility]  [email or web address]  [phone]    Daniel Lyons MD, FACOG       [facility]   FOLLOW UP OB NOTE WITH ULTRASOUND    Chief Complaint   Patient presents with    Routine Prenatal Visit     Vitals:    18 1417   BP: 126/70   Weight: 187 lb (84.8 kg)   Height: 4' 11\" (1.499 m)       The patient reports the following concerns: none, occ nausea   See PN flowsheet for exam    34 y.o.  33w5d   Encounter Diagnosis   Name Primary?  Prenatal care of multigravida, antepartum Yes         I discussed with the patient the limitations of ultrasound and that imaging can not rule out all birth defects, chromosomal problems, or other problems with the baby. [] SAB/bleeding precautions reviewed   [] PTL/PPROM precautions reviewed   [] Labor precautions reviewed   [] Fetal kick counts discussed   [] Labs reviewed with patient   []     I discussed with the patient the limitations of ultrasound and that imaging can not rule out all birth defects, chromosomal problems, or other problems with the baby. Follow-up Disposition:  Return in about 2 weeks (around 4/3/2018) for Follow up OB visit. No orders of the defined types were placed in this encounter. Daniel Lyons MD        Physician review of ultrasound performed by technician    Today's ultrasound report and images were reviewed and discussed with the patient. Please see images and imaging report entered by technician in PACS for more detail and progress note and diagnosis entered by MD.    Freeman Jackson MD  LIMITED OB SCAN  A SINGLE VERTEX 33W5D IUP IS SEEN. FETAL CARDIAC MOTION OBSERVED. LIMITED ANATOMY WAS VISUALIZED AND APPEARS WNL. APPROPRIATE FETAL GROWTH IS SEEN. SIZE = DATES. JULI AND PLACENTA APPEAR WNL.

## 2018-03-20 NOTE — MR AVS SNAPSHOT
[address] [facility] [address] [facility] 
[phone] Patient: Dee Sheppard MRN: [id number] KEX:[id number] Visit Information Date & Time Provider Department Dept. Phone Encounter #  
 3/20/2018  2:30 PM MD Marco Antonio Murillo [id number] Follow-up Instructions Return in about 2 weeks (around 4/3/2018) for Follow up OB visit. 3/20/2018  2:30 PM  
OB VISIT with MD Marco Antonio Murillo ([address]) Appt Note: 2wk fob with u/s growth 33wk   TP  
 [address] [facility] [address] [place] [address]  
[phone]  
  
   
 [address] [place] [address]  
  
    
 4/12/2018  7:50 AM  
OB VISIT with MD Marco Antonio Murillo ([address]) Appt Note: 3wk fob TP  
 [address] [facility] [address] [place] [address]  
[phone] Upcoming Health Maintenance Date Due Influenza Age 5 to Adult 8/1/2017 PAP AKA CERVICAL CYTOLOGY 10/5/2020 Allergies as of 3/20/2018  Review Complete On: 3/20/2018 By: Ron Alberts LPN Severity Noted Reaction Type Reactions Amoxicillin  01/10/2013   Systemic Hives Current Immunizations  Reviewed on 5/2/2016 Name Date Influenza Vaccine Clydie Bulla) 11/4/2015 Tdap 2/20/2018, 2/10/2016 Not reviewed this visit You Were Diagnosed With   
  
 Codes Comments Prenatal care of multigravida, antepartum    -  Primary ICD-10-CM: Z34.80 ICD-9-CM: V22.1 Vitals BP Height(growth percentile) Weight(growth percentile) LMP BMI OB Status 126/70 4' 11\" (1.499 m) 187 lb (84.8 kg) 07/27/2017 37.77 kg/m2 Pregnant Smoking Status Current Every Day Smoker BMI and BSA Data Body Mass Index Body Surface Area  
 37.77 kg/m 2 1.88 m 2 Preferred Pharmacy Pharmacy Name Phone [id number]- [place], [place] - [address] AT [facility] [phone] Your Updated Medication List  
  
   
This list is accurate as of 3/20/18  2:20 PM.  Always use your most recent med list.  
  
  
  
  
 butalbital-acetaminophen-caffeine -40 mg per tablet Commonly known as:  Priscila Cincinnatus Take 1 Tab by mouth every six (6) hours as needed for Pain. PRENATAL VITAMIN PO Take  by mouth. * SYNTHROID 125 mcg tablet Generic drug:  levothyroxine TAKE 1 TABLET BY MOUTH EVERY DAY  
  
 * SYNTHROID 175 mcg tablet Generic drug:  levothyroxine TK 1 T PO D  
  
 * Notice: This list has 2 medication(s) that are the same as other medications prescribed for you. Read the directions carefully, and ask your doctor or other care provider to review them with you. Follow-up Instructions Return in about 2 weeks (around 4/3/2018) for Follow up OB visit. Patient Instructions Weeks 34 to 36 of Your Pregnancy: Care Instructions Your Care Instructions By now, your baby and your belly have grown quite large. It is almost time to give birth. A full-term pregnancy can deliver between 37 and 42 weeks. Your baby's lungs are almost ready to breathe air. The bones in your baby's head are now firm enough to protect it, but soft enough to move down through the birth canal. 
You may feel excited, happy, anxious, or scared. You may wonder how you will know if you are in labor or what to expect during labor. Try to be flexible in your expectations of the birth. Because each birth is different, there is no way to know exactly what childbirth will be like for you. This care sheet will help you know what to expect and how to prepare. This may make your childbirth easier. If you haven't already had the Tdap shot during this pregnancy, talk to your doctor about getting it. It will help protect your  against pertussis infection. In the 36th week, most women have a test for group B streptococcus (GBS). GBS is a common bacteria that can live in the vagina and rectum. It can make your baby sick after birth. If you test positive, you will get antibiotics during labor. The medicine will keep your baby from getting the bacteria. Follow-up care is a key part of your treatment and safety. Be sure to make and go to all appointments, and call your doctor if you are having problems. It's also a good idea to know your test results and keep a list of the medicines you take. How can you care for yourself at home? Learn about pain relief choices · Pain is different for every woman. Talk with your doctor about your feelings about pain. · You can choose from several types of pain relief. These include medicine or breathing techniques, as well as comfort measures. You can use more than one option. · If you choose to have pain medicine during labor, talk to your doctor about your options. Some medicines lower anxiety and help with some of the pain. Others make your lower body numb so that you won't feel pain. · Be sure to tell your doctor about your pain medicine choice before you start labor or very early in your labor. You may be able to change your mind as labor progresses. · Rarely, a woman is put to sleep by medicine given through a mask or an IV. Labor and delivery · The first stage of labor has three parts: early, active, and transition. ¨ Most women have early labor at home. You can stay busy or rest, eat light snacks, drink clear fluids, and start counting contractions. ¨ When talking during a contraction gets hard, you may be moving to active labor. During active labor, you should head for the hospital if you are not there already. ¨ You are in active labor when contractions come every 3 to 4 minutes and last about 60 seconds. Your cervix is opening more rapidly. ¨ If your water breaks, contractions will come faster and stronger. ¨ During transition, your cervix is stretching, and contractions are coming more rapidly. ¨ You may want to push, but your cervix might not be ready. Your doctor will tell you when to push. · The second stage starts when your cervix is completely opened and you are ready to push. ¨ Contractions are very strong to push the baby down the birth canal. 
¨ You will feel the urge to push. You may feel like you need to have a bowel movement. ¨ You may be coached to push with contractions. These contractions will be very strong, but you will not have them as often. You can get a little rest between contractions. ¨ You may be emotional and irritable. You may not be aware of what is going on around you. ¨ One last push, and your baby is born. · The third stage is when a few more contractions push out the placenta. This may take 30 minutes or less. · The fourth stage is the welcome recovery. You may feel overwhelmed with emotions and exhausted but alert. This is a good time to start breastfeeding. Where can you learn more? Go to http://jo ann-elizabeth.info/. Enter Z864 in the search box to learn more about \"Weeks 34 to 36 of Your Pregnancy: Care Instructions. \" Current as of: March 16, 2017 Content Version: 11.4 © 0437-5263 Healthwise, Incorporated. Care instructions adapted under license by "[employer] (which disclaims liability or warranty for this information). If you have questions about a medical condition or this instruction, always ask your healthcare professional. [place] [address] any warranty or liability for your use of this information. Introducing [facility]! Elena Araiza introduces [employer] patient portal. Now you can access parts of your medical record, email your doctor's office, and request medication refills online. 1. In your internet browser, go to [email or web address] 2. Click on the First Time User? Click Here link in the Sign In box. You will see the New Member Sign Up page. 3. Enter your [employer] Access Code exactly as it appears below. You will not need to use this code after youve completed the sign-up process. If you do not sign up before the expiration date, you must request a new code. · [employer] Access Code: [email or web address] Expires: 4/9/2018  3:40 PM 
 
4. Enter the last four digits of your Social Security Number (xxxx) and Date of Birth (mm/dd/yyyy) as indicated and click Submit. You will be taken to the next sign-up page. 5. Create a [employer] ID. This will be your [employer] login ID and cannot be changed, so think of one that is secure and easy to remember. 6. Create a [employer] password. You can change your password at any time. 7. Enter your Password Reset Question and Answer. This can be used at a later time if you forget your password. 8. Enter your e-mail address. You will receive e-mail notification when new information is available in [address] 9. Click Sign Up. You can now view and download portions of your medical record. 10. Click the Download Summary menu link to download a portable copy of your medical information. If you have questions, please visit the Frequently Asked Questions section of the [employer] website. Remember, [employer] is NOT to be used for urgent needs. For medical emergencies, dial 911. Now available from your iPhone and Android! Please provide this summary of care documentation to your next provider. Your primary care clinician is listed as Shaw Starks. If you have any questions after today's visit, please call [phone].

## 2018-04-02 LAB — GRBS, EXTERNAL: NEGATIVE

## 2018-04-12 ENCOUNTER — ROUTINE PRENATAL (OUTPATIENT)
Dept: OBGYN CLINIC | Age: 33
End: 2018-04-12

## 2018-04-12 VITALS
DIASTOLIC BLOOD PRESSURE: 64 MMHG | BODY MASS INDEX: 37.5 KG/M2 | WEIGHT: 186 LBS | SYSTOLIC BLOOD PRESSURE: 118 MMHG | HEIGHT: 59 IN

## 2018-04-12 DIAGNOSIS — Z34.80 PRENATAL CARE OF MULTIGRAVIDA, ANTEPARTUM: Primary | ICD-10-CM

## 2018-04-12 NOTE — MR AVS SNAPSHOT
[address] [place] [address] [facility] 
[phone] Patient: Charmaine Menjivar MRN: [id number] BABS:[id number] Visit Information Date & Time Provider Department Dept. Phone Encounter #  
 4/12/2018  7:50 AM Yamilka Doe MD [address] [id number] Follow-up Instructions Return in about 1 week (around 4/19/2018) for Follow up OB visit. 4/17/2018  2:40 PM  
OB VISIT with MD Marco Antonio Boudreaux ([address]) Appt Note: 1wk fob TP  
 [address] [facility] [address] [place] [address]  
[phone]  
  
   
 [address]  
  
    
 4/24/2018  2:40 PM  
OB VISIT with MD Marco Antonio Boudreaux ([address]) Appt Note: 38wks TP  
 [address] [facility] [address] [place] [address]  
[phone] Upcoming Health Maintenance Date Due Influenza Age 5 to Adult 8/1/2017 PAP AKA CERVICAL CYTOLOGY 10/5/2020 Allergies as of 4/12/2018  Review Complete On: 4/12/2018 By: Yamilka Doe MD  
  
 Severity Noted Reaction Type Reactions Amoxicillin  01/10/2013   Systemic Hives Current Immunizations  Reviewed on 5/2/2016 Name Date Influenza Vaccine Jeanna Bower) 11/4/2015 Tdap 2/20/2018, 2/10/2016 Not reviewed this visit You Were Diagnosed With   
  
 Codes Comments Prenatal care of multigravida, antepartum    -  Primary ICD-10-CM: Z34.80 ICD-9-CM: V22.1 Vitals BP Height(growth percentile) Weight(growth percentile) LMP BMI OB Status 118/64 4' 11\" (1.499 m) 186 lb (84.4 kg) 07/27/2017 37.57 kg/m2 Pregnant Smoking Status Current Every Day Smoker BMI and BSA Data Body Mass Index Body Surface Area  
 37.57 kg/m 2 1.87 m 2 Preferred Pharmacy Pharmacy Name Phone [id number] [place], [place] - [address] [facility] [phone] Your Updated Medication List  
  
   
This list is accurate as of 4/12/18  8:04 AM.  Always use your most recent med list.  
  
  
  
  
 butalbital-acetaminophen-caffeine -40 mg per tablet Commonly known as:  Suzzajosee Ro Take 1 Tab by mouth every six (6) hours as needed for Pain. PRENATAL VITAMIN PO Take  by mouth. * SYNTHROID 125 mcg tablet Generic drug:  levothyroxine TAKE 1 TABLET BY MOUTH EVERY DAY  
  
 * SYNTHROID 175 mcg tablet Generic drug:  levothyroxine TK 1 T PO D  
  
 * Notice: This list has 2 medication(s) that are the same as other medications prescribed for you. Read the directions carefully, and ask your doctor or other care provider to review them with you. We Performed the Following GROUP B STREP BY CELSO [[id number] Custom] Follow-up Instructions Return in about 1 week (around 4/19/2018) for Follow up OB visit. Patient Instructions Week 37 of Your Pregnancy: Care Instructions Your Care Instructions You are near the end of your pregnancy-and you're probably pretty uncomfortable. It may be harder to walk around. Lying down probably isn't comfortable either. You may have trouble getting to sleep or staying asleep. Most women deliver their babies between 40 and 41 weeks. This is a good time to think about packing a bag for the hospital with items you'll need. Then you'll be ready when labor starts. Follow-up care is a key part of your treatment and safety. Be sure to make and go to all appointments, and call your doctor if you are having problems. It's also a good idea to know your test results and keep a list of the medicines you take. How can you care for yourself at home? Learn about breastfeeding · Breastfeeding is best for your baby and good for you. · Breast milk has antibodies to help your baby fight infections. · Mothers who breastfeed often lose weight faster, because making milk burns calories. · Learning the best ways to hold your baby will make breastfeeding easier. · Let your partner bathe and diaper the baby to keep your partner from feeling left out. Snuggle together when you breastfeed. · You may want to learn how to use a breast pump and store your milk. · If you choose to bottle feed, make the feeding feel like breastfeeding so you can bond with your baby. Always hold your baby and the bottle. Do not prop bottles or let your baby fall asleep with a bottle. Learn about crying · It is common for babies to cry for 1 to 3 hours a day. Some cry more, some cry less. · Babies don't cry to make you upset or because you are a bad parent. · Crying is how your baby communicates. Your baby may be hungry; have gas; need a diaper change; or feel cold, warm, tired, lonely, or tense. Sometimes babies cry for unknown reasons. · If you respond to your baby's needs, he or she will learn to trust you. · Try to stay calm when your baby cries. Your baby may get more upset if he or she senses that you are upset. Know how to care for your  · Your baby's umbilical cord stump will drop off on its own, usually between 1 and 2 weeks. To care for your baby's umbilical cord area: ¨ Clean the area at the bottom of the cord 2 or 3 times a day. ¨ Pay special attention to the area where the cord attaches to the skin. ¨ Keep the diaper folded below the cord. ¨ Use a damp washcloth or cotton ball to sponge bathe your baby until the stump has come off. · Your baby's first dark stool is called meconium. After the meconium is passed, your baby will develop his or her own bowel pattern. ¨ Some babies, especially  babies, have several bowel movements a day. Others have one or two a day, or one every 2 to 3 days. ¨  babies often have loose, yellow stools. Formula-fed babies have more formed stools. ¨ If your baby's stools look like little pellets, he or she is constipated. After 2 days of constipation, call your baby's doctor. · If your baby will be circumcised, you can care for him at home. ¨ Gently rinse his penis with warm water after every diaper change. Do not try to remove the film that forms on the penis. This film will go away on its own. Pat dry. ¨ Put petroleum ointment, such as Vaseline, on the area of the diaper that will touch your baby's penis. This will keep the diaper from sticking to your baby. ¨ Ask the doctor about giving your baby acetaminophen (Tylenol) for pain. Where can you learn more? Go to http://jo ann-elizabeth.info/. Enter 68 21 97 in the search box to learn more about \"Week 37 of Your Pregnancy: Care Instructions. \" Current as of: March 16, 2017 Content Version: 11.4 © 7152-1532 [employer]. Care instructions adapted under license by [employer] (which disclaims liability or warranty for this information). If you have questions about a medical condition or this instruction, always ask your healthcare professional. [place] [address] any warranty or liability for your use of this information. Introducing [facility]! [facility] introduces db4objects patient portal. Now you can access parts of your medical record, email your doctor's office, and request medication refills online. 1. In your internet browser, go to [email or web address] 2. Click on the First Time User? Click Here link in the Sign In box. You will see the New Member Sign Up page. 3. Enter your db4objects Access Code exactly as it appears below. You will not need to use this code after youve completed the sign-up process. If you do not sign up before the expiration date, you must request a new code. · Zayo Access Code: [email or web address] Expires: 7/11/2018  8:04 AM 
 
4. Enter the last four digits of your Social Security Number (xxxx) and Date of Birth (mm/dd/yyyy) as indicated and click Submit. You will be taken to the next sign-up page. 5. Create a Zayo ID. This will be your Zayo login ID and cannot be changed, so think of one that is secure and easy to remember. 6. Create a Zayo password. You can change your password at any time. 7. Enter your Password Reset Question and Answer. This can be used at a later time if you forget your password. 8. Enter your e-mail address. You will receive e-mail notification when new information is available in [address] 9. Click Sign Up. You can now view and download portions of your medical record. 10. Click the Download Summary menu link to download a portable copy of your medical information. If you have questions, please visit the Frequently Asked Questions section of the Zayo website. Remember, Zayo is NOT to be used for urgent needs. For medical emergencies, dial 911. Now available from your iPhone and Android! Please provide this summary of care documentation to your next provider. Your primary care clinician is listed as Parveen Carver. If you have any questions after today's visit, please call [phone].

## 2018-04-12 NOTE — PROGRESS NOTES
[facility]  [email or web address]  [phone]    Jin Hays MD, FACOG     Follow-up OB visit    Chief Complaint   Patient presents with    Routine Prenatal Visit       Vitals:    18 0802   BP: 118/64   Weight: 186 lb (84.4 kg)   Height: 4' 11\" (1.499 m)       Patient Active Problem List    Diagnosis Date Noted    Prenatal care of multigravida, antepartum 10/28/2017    Pregnant 2016    Chronic migraine without aura without status migrainosus, not intractable 2015    Analgesic overuse headache 2015    Intractable migraine without aura 2015    Thyroid cancer ([facility] [address] 01/10/2013       The patient reports the following concerns: pelvic pressure    See PN flowsheet for exam    34 y.o.  37w0d   Encounter Diagnosis   Name Primary?  Prenatal care of multigravida, antepartum Yes        [] SAB/bleeding precautions reviewed   [] PTL/PPROM precautions reviewed   [x] Labor precautions reviewed   [x] Fetal kick counts discussed   [] Labs reviewed with patient   [] Coralee Brawn precautions reviewed   [] Consent reviewed   [] Handouts given to pt   [] Glucola handout    [] GBS/labor/Magic Hour handout   []    []    []    []    Follow-up Disposition:  Return in about 1 week (around 2018) for Follow up OB visit.     Orders Placed This Encounter   Ene Morales MD

## 2018-04-12 NOTE — PATIENT INSTRUCTIONS
Week 37 of Your Pregnancy: Care Instructions  Your Care Instructions    You are near the end of your pregnancy-and you're probably pretty uncomfortable. It may be harder to walk around. Lying down probably isn't comfortable either. You may have trouble getting to sleep or staying asleep. Most women deliver their babies between 40 and 41 weeks. This is a good time to think about packing a bag for the hospital with items you'll need. Then you'll be ready when labor starts. Follow-up care is a key part of your treatment and safety. Be sure to make and go to all appointments, and call your doctor if you are having problems. It's also a good idea to know your test results and keep a list of the medicines you take. How can you care for yourself at home? Learn about breastfeeding  · Breastfeeding is best for your baby and good for you. · Breast milk has antibodies to help your baby fight infections. · Mothers who breastfeed often lose weight faster, because making milk burns calories. · Learning the best ways to hold your baby will make breastfeeding easier. · Let your partner bathe and diaper the baby to keep your partner from feeling left out. Snuggle together when you breastfeed. · You may want to learn how to use a breast pump and store your milk. · If you choose to bottle feed, make the feeding feel like breastfeeding so you can bond with your baby. Always hold your baby and the bottle. Do not prop bottles or let your baby fall asleep with a bottle. Learn about crying  · It is common for babies to cry for 1 to 3 hours a day. Some cry more, some cry less. · Babies don't cry to make you upset or because you are a bad parent. · Crying is how your baby communicates. Your baby may be hungry; have gas; need a diaper change; or feel cold, warm, tired, lonely, or tense. Sometimes babies cry for unknown reasons. · If you respond to your baby's needs, he or she will learn to trust you.   · Try to stay calm when your baby cries. Your baby may get more upset if he or she senses that you are upset. Know how to care for your   · Your baby's umbilical cord stump will drop off on its own, usually between 1 and 2 weeks. To care for your baby's umbilical cord area:  ¨ Clean the area at the bottom of the cord 2 or 3 times a day. ¨ Pay special attention to the area where the cord attaches to the skin. ¨ Keep the diaper folded below the cord. ¨ Use a damp washcloth or cotton ball to sponge bathe your baby until the stump has come off. · Your baby's first dark stool is called meconium. After the meconium is passed, your baby will develop his or her own bowel pattern. ¨ Some babies, especially  babies, have several bowel movements a day. Others have one or two a day, or one every 2 to 3 days. ¨  babies often have loose, yellow stools. Formula-fed babies have more formed stools. ¨ If your baby's stools look like little pellets, he or she is constipated. After 2 days of constipation, call your baby's doctor. · If your baby will be circumcised, you can care for him at home. ¨ Gently rinse his penis with warm water after every diaper change. Do not try to remove the film that forms on the penis. This film will go away on its own. Pat dry. ¨ Put petroleum ointment, such as Vaseline, on the area of the diaper that will touch your baby's penis. This will keep the diaper from sticking to your baby. ¨ Ask the doctor about giving your baby acetaminophen (Tylenol) for pain. Where can you learn more? Go to http://jo ann-elizabeth.info/. Enter 68 21 97 in the search box to learn more about \"Week 37 of Your Pregnancy: Care Instructions. \"  Current as of: 2017  Content Version: 11.4  © 4618-8437 [facility]. Care instructions adapted under license by [employer] (which disclaims liability or warranty for this information).  If you have questions about a medical condition or this instruction, always ask your healthcare professional. [place] [address] any warranty or liability for your use of this information.

## 2018-04-12 NOTE — PROGRESS NOTES
[facility]  [email or web address]  [phone]    Flash Botello MD, FACOG     Follow-up OB visit    Chief Complaint   Patient presents with    Routine Prenatal Visit       Vitals:    18 0802   BP: 118/64   Weight: 186 lb (84.4 kg)   Height: 4' 11\" (1.499 m)       Patient Active Problem List    Diagnosis Date Noted    Prenatal care of multigravida, antepartum 10/28/2017    Pregnant 2016    Chronic migraine without aura without status migrainosus, not intractable 2015    Analgesic overuse headache 2015    Intractable migraine without aura 2015    Thyroid cancer ([facility] [address] 01/10/2013       The patient reports the following concerns: pelvic pressure. GBS today. See PN flowsheet for exam    34 y.o.  37w0d   Encounter Diagnosis   Name Primary?  Prenatal care of multigravida, antepartum Yes        [] SAB/bleeding precautions reviewed   [] PTL/PPROM precautions reviewed   [] Labor precautions reviewed   [] Fetal kick counts discussed   [] Labs reviewed with patient   [] Devine Minder precautions reviewed   [] Consent reviewed   [] Handouts given to pt   [] Glucola handout    [] GBS/labor/Magic Hour handout   []    []    []    []    Follow-up Disposition:  Return in about 1 week (around 2018) for Follow up OB visit.     Orders Placed This Encounter   Trudy Bermudez MD

## 2018-04-14 LAB — GP B STREP DNA SPEC QL NAA+PROBE: NEGATIVE

## 2018-04-17 ENCOUNTER — ROUTINE PRENATAL (OUTPATIENT)
Dept: OBGYN CLINIC | Age: 33
End: 2018-04-17

## 2018-04-17 VITALS — WEIGHT: 188 LBS | DIASTOLIC BLOOD PRESSURE: 62 MMHG | BODY MASS INDEX: 37.97 KG/M2 | SYSTOLIC BLOOD PRESSURE: 122 MMHG

## 2018-04-17 DIAGNOSIS — Z34.80 PRENATAL CARE OF MULTIGRAVIDA, ANTEPARTUM: Primary | ICD-10-CM

## 2018-04-17 NOTE — PATIENT INSTRUCTIONS
Week 38 of Your Pregnancy: Care Instructions  Your Care Instructions    Believe it or not, your baby is almost here. You may have ideas about your baby's personality because of how much he or she moves. Or you may have noticed how he or she responds to sounds, warmth, cold, and light. You may even know what kind of music your baby likes. By now, you have a better idea of what to expect during delivery. You may have talked about your birth preferences with your doctor. But even if you want a vaginal birth, it is a good idea to learn about  births.  birth means that your baby is born through a cut (incision) in your lower belly. It is sometimes the best choice for the health of the baby and the mother. This care sheet can help you understand  births. It also gives you information about what to expect after your baby is born. And it helps you understand more about postpartum depression. Follow-up care is a key part of your treatment and safety. Be sure to make and go to all appointments, and call your doctor if you are having problems. It's also a good idea to know your test results and keep a list of the medicines you take. How can you care for yourself at home? Learn about  birth  · Most C-sections are unplanned. They are done because of problems that occur during labor. These problems might include:  ¨ Labor that slows or stops. ¨ High blood pressure or other problems for the mother. ¨ Signs of distress in the baby. These signs may include a very fast or slow heart rate. · Although most mothers and babies do well after , it is major surgery. It has more risks than a vaginal delivery. · In some cases, a planned  may be safer than a vaginal delivery. This may be the case if:  ¨ The mother has a health problem, such as a heart condition. ¨ The baby isn't in a head-down position for delivery. This is called a breech position.   ¨ The uterus has scars from past surgeries. This could increase the chance of a tear in the uterus. ¨ There is a problem with the placenta. ¨ The mother has an infection, such as genital herpes, that could be spread to the baby. ¨ The mother is having twins or more. ¨ The baby weighs 9 to 10 pounds or more. · Because of the risks of , planned C-sections generally should be done only for medical reasons. And a planned  should be done at 39 weeks or later unless there is a medical reason to do it sooner. Know what to expect after delivery, and plan for the first few weeks at home  · You, your baby, and your partner or  will get identification bands. Only people with matching bands can  the baby from the nursery. · You will learn how to feed, diaper, and bathe your baby. And you will learn how to care for the umbilical cord stump. If your baby will be circumcised, you will also learn how to care for that. · Ask people to wait to visit you until you are at home. And ask them to wash their hands before they touch your baby. · Make sure you have another adult in your home for at least 2 or 3 days after the birth. · During the first 2 weeks, limit when friends and family can visit. · Do not allow visitors who have colds or infections. Make sure all visitors are up to date with their vaccinations. Never let anyone smoke around your baby. · Try to nap when the baby naps. Be aware of postpartum depression  · \"Baby blues\" are common for the first 1 to 2 weeks after birth. You may cry or feel sad or irritable for no reason. · For some women, these feelings last longer and are more intense. This is called postpartum depression. · If your symptoms last for more than a few weeks or you feel very depressed, ask your doctor for help. · Postpartum depression can be treated. Support groups and counseling can help. Sometimes medicine can also help. Where can you learn more?   Go to http://jo ann-elizabeth.info/. Enter B044 in the search box to learn more about \"Week 38 of Your Pregnancy: Care Instructions. \"  Current as of: March 16, 2017  Content Version: 11.4  © 1210-0892 [employer]. Care instructions adapted under license by [employer] (which disclaims liability or warranty for this information). If you have questions about a medical condition or this instruction, always ask your healthcare professional. [address] any warranty or liability for your use of this information.

## 2018-04-17 NOTE — MR AVS SNAPSHOT
[address] [place] [address] [address] 1007 [facility] 
[phone] Patient: Cal Rodriguez MRN: [id number] MCN:[id number] Visit Information Date & Time Provider Department Dept. Phone Encounter #  
 4/17/2018  2:40 PM MD Marco Antonio Adrian [id number] 4/17/2018  2:40 PM  
OB VISIT with MD Marco Antonio Adrian [facility] Appt Note: 1wk fob TP  
 [address] [facility] [address] [place] [address] 1007 [facility]  
  
    
 4/26/2018  8:50 AM  
OB VISIT with MD Marco Antonio Adrian ([facility] Appt Note: 38wks TP; r/s from 4/24 per pt  
 [address] [facility] [address] [place] [address]  
[phone] Upcoming Health Maintenance Date Due Influenza Age 5 to Adult 8/1/2017 PAP AKA CERVICAL CYTOLOGY 10/5/2020 Allergies as of 4/17/2018  Review Complete On: 4/17/2018 By: Clif Rosas LPN Severity Noted Reaction Type Reactions Amoxicillin  01/10/2013   Systemic Hives Current Immunizations  Reviewed on 5/2/2016 Name Date Influenza Vaccine Elfego Rosa) 11/4/2015 Tdap 2/20/2018, 2/10/2016 Not reviewed this visit Vitals BP Weight(growth percentile) LMP BMI OB Status Smoking Status 122/62 188 lb (85.3 kg) 07/27/2017 37.97 kg/m2 Pregnant Current Every Day Smoker Vitals History BMI and BSA Data Body Mass Index Body Surface Area  
 37.97 kg/m 2 1.88 m 2 Preferred Pharmacy Pharmacy Name Phone [id number] [place], [place] - [address] [facility] [phone] Your Updated Medication List  
  
   
This list is accurate as of 4/17/18  2:37 PM.  Always use your most recent med list.  
  
  
  
  
 butalbital-acetaminophen-caffeine -40 mg per tablet Commonly known as:  Denton Click Take 1 Tab by mouth every six (6) hours as needed for Pain. PRENATAL VITAMIN PO Take  by mouth. * SYNTHROID 125 mcg tablet Generic drug:  levothyroxine TAKE 1 TABLET BY MOUTH EVERY DAY  
  
 * SYNTHROID 175 mcg tablet Generic drug:  levothyroxine TK 1 T PO D  
  
 * Notice: This list has 2 medication(s) that are the same as other medications prescribed for you. Read the directions carefully, and ask your doctor or other care provider to review them with you. Patient Instructions Week 38 of Your Pregnancy: Care Instructions Your Care Instructions Believe it or not, your baby is almost here. You may have ideas about your baby's personality because of how much he or she moves. Or you may have noticed how he or she responds to sounds, warmth, cold, and light. You may even know what kind of music your baby likes. By now, you have a better idea of what to expect during delivery. You may have talked about your birth preferences with your doctor. But even if you want a vaginal birth, it is a good idea to learn about  births.  birth means that your baby is born through a cut (incision) in your lower belly. It is sometimes the best choice for the health of the baby and the mother. This care sheet can help you understand  births. It also gives you information about what to expect after your baby is born. And it helps you understand more about postpartum depression. Follow-up care is a key part of your treatment and safety. Be sure to make and go to all appointments, and call your doctor if you are having problems. It's also a good idea to know your test results and keep a list of the medicines you take. How can you care for yourself at home? Learn about  birth · Most C-sections are unplanned.  They are done because of problems that occur during labor. These problems might include: 
¨ Labor that slows or stops. ¨ High blood pressure or other problems for the mother. ¨ Signs of distress in the baby. These signs may include a very fast or slow heart rate. · Although most mothers and babies do well after , it is major surgery. It has more risks than a vaginal delivery. · In some cases, a planned  may be safer than a vaginal delivery. This may be the case if: ¨ The mother has a health problem, such as a heart condition. ¨ The baby isn't in a head-down position for delivery. This is called a breech position. ¨ The uterus has scars from past surgeries. This could increase the chance of a tear in the uterus. ¨ There is a problem with the placenta. ¨ The mother has an infection, such as genital herpes, that could be spread to the baby. ¨ The mother is having twins or more. ¨ The baby weighs 9 to 10 pounds or more. · Because of the risks of , planned C-sections generally should be done only for medical reasons. And a planned  should be done at 39 weeks or later unless there is a medical reason to do it sooner. Know what to expect after delivery, and plan for the first few weeks at home · You, your baby, and your partner or  will get identification bands. Only people with matching bands can  the baby from the nursery. · You will learn how to feed, diaper, and bathe your baby. And you will learn how to care for the umbilical cord stump. If your baby will be circumcised, you will also learn how to care for that. · Ask people to wait to visit you until you are at home. And ask them to wash their hands before they touch your baby. · Make sure you have another adult in your home for at least 2 or 3 days after the birth. · During the first 2 weeks, limit when friends and family can visit. · Do not allow visitors who have colds or infections.  Make sure all visitors are up to date with their vaccinations. Never let anyone smoke around your baby. · Try to nap when the baby naps. Be aware of postpartum depression · \"Baby blues\" are common for the first 1 to 2 weeks after birth. You may cry or feel sad or irritable for no reason. · For some women, these feelings last longer and are more intense. This is called postpartum depression. · If your symptoms last for more than a few weeks or you feel very depressed, ask your doctor for help. · Postpartum depression can be treated. Support groups and counseling can help. Sometimes medicine can also help. Where can you learn more? Go to http://jo annBiGx Mediaelizabeth.info/. Enter B044 in the search box to learn more about \"Week 38 of Your Pregnancy: Care Instructions. \" Current as of: March 16, 2017 Content Version: 11.4 © 0447-9190 [employer]. Care instructions adapted under license by [employer] (which disclaims liability or warranty for this information). If you have questions about a medical condition or this instruction, always ask your healthcare professional. [address] any warranty or liability for your use of this information. Introducing \[facility] [place] [facility]\"" & HEALTH SERVICES! [employer] introduces [employer] patient portal. Now you can access parts of your medical record, email your doctor's office, and request medication refills online. 1. In your internet browser, go to [email or web address] 2. Click on the First Time User? Click Here link in the Sign In box. You will see the New Member Sign Up page. 3. Enter your [employer] Access Code exactly as it appears below. You will not need to use this code after youve completed the sign-up process. If you do not sign up before the expiration date, you must request a new code. · [employer] Access Code: [email or web address] Expires: 7/11/2018  8:04 AM 
 
 4. Enter the last four digits of your Social Security Number (xxxx) and Date of Birth (mm/dd/yyyy) as indicated and click Submit. You will be taken to the next sign-up page. 5. Create a [employer] ID. This will be your [employer] login ID and cannot be changed, so think of one that is secure and easy to remember. 6. Create a [employer] password. You can change your password at any time. 7. Enter your Password Reset Question and Answer. This can be used at a later time if you forget your password. 8. Enter your e-mail address. You will receive e-mail notification when new information is available in [address] 9. Click Sign Up. You can now view and download portions of your medical record. 10. Click the Download Summary menu link to download a portable copy of your medical information. If you have questions, please visit the Frequently Asked Questions section of the [employer] website. Remember, [employer] is NOT to be used for urgent needs. For medical emergencies, dial 911. Now available from your iPhone and Android! Please provide this summary of care documentation to your next provider. Your primary care clinician is listed as Jory Pena. If you have any questions after today's visit, please call [phone].

## 2018-04-24 ENCOUNTER — ROUTINE PRENATAL (OUTPATIENT)
Dept: OBGYN CLINIC | Age: 33
End: 2018-04-24

## 2018-04-24 VITALS
DIASTOLIC BLOOD PRESSURE: 72 MMHG | SYSTOLIC BLOOD PRESSURE: 120 MMHG | WEIGHT: 188 LBS | HEIGHT: 59 IN | BODY MASS INDEX: 37.9 KG/M2

## 2018-04-24 DIAGNOSIS — Z34.80 PRENATAL CARE OF MULTIGRAVIDA, ANTEPARTUM: Primary | ICD-10-CM

## 2018-04-24 NOTE — PATIENT INSTRUCTIONS
Week 38 of Your Pregnancy: Care Instructions  Your Care Instructions    Believe it or not, your baby is almost here. You may have ideas about your baby's personality because of how much he or she moves. Or you may have noticed how he or she responds to sounds, warmth, cold, and light. You may even know what kind of music your baby likes. By now, you have a better idea of what to expect during delivery. You may have talked about your birth preferences with your doctor. But even if you want a vaginal birth, it is a good idea to learn about  births.  birth means that your baby is born through a cut (incision) in your lower belly. It is sometimes the best choice for the health of the baby and the mother. This care sheet can help you understand  births. It also gives you information about what to expect after your baby is born. And it helps you understand more about postpartum depression. Follow-up care is a key part of your treatment and safety. Be sure to make and go to all appointments, and call your doctor if you are having problems. It's also a good idea to know your test results and keep a list of the medicines you take. How can you care for yourself at home? Learn about  birth  · Most C-sections are unplanned. They are done because of problems that occur during labor. These problems might include:  ¨ Labor that slows or stops. ¨ High blood pressure or other problems for the mother. ¨ Signs of distress in the baby. These signs may include a very fast or slow heart rate. · Although most mothers and babies do well after , it is major surgery. It has more risks than a vaginal delivery. · In some cases, a planned  may be safer than a vaginal delivery. This may be the case if:  ¨ The mother has a health problem, such as a heart condition. ¨ The baby isn't in a head-down position for delivery. This is called a breech position.   ¨ The uterus has scars from past surgeries. This could increase the chance of a tear in the uterus. ¨ There is a problem with the placenta. ¨ The mother has an infection, such as genital herpes, that could be spread to the baby. ¨ The mother is having twins or more. ¨ The baby weighs 9 to 10 pounds or more. · Because of the risks of , planned C-sections generally should be done only for medical reasons. And a planned  should be done at 39 weeks or later unless there is a medical reason to do it sooner. Know what to expect after delivery, and plan for the first few weeks at home  · You, your baby, and your partner or  will get identification bands. Only people with matching bands can  the baby from the nursery. · You will learn how to feed, diaper, and bathe your baby. And you will learn how to care for the umbilical cord stump. If your baby will be circumcised, you will also learn how to care for that. · Ask people to wait to visit you until you are at home. And ask them to wash their hands before they touch your baby. · Make sure you have another adult in your home for at least 2 or 3 days after the birth. · During the first 2 weeks, limit when friends and family can visit. · Do not allow visitors who have colds or infections. Make sure all visitors are up to date with their vaccinations. Never let anyone smoke around your baby. · Try to nap when the baby naps. Be aware of postpartum depression  · \"Baby blues\" are common for the first 1 to 2 weeks after birth. You may cry or feel sad or irritable for no reason. · For some women, these feelings last longer and are more intense. This is called postpartum depression. · If your symptoms last for more than a few weeks or you feel very depressed, ask your doctor for help. · Postpartum depression can be treated. Support groups and counseling can help. Sometimes medicine can also help. Where can you learn more?   Go to http://jo ann-elizabeth.info/. Enter B044 in the search box to learn more about \"Week 38 of Your Pregnancy: Care Instructions. \"  Current as of: March 16, 2017  Content Version: 11.4  © 6400-7009 Healthwise, Incorporated. Care instructions adapted under license by [employer] (which disclaims liability or warranty for this information). If you have questions about a medical condition or this instruction, always ask your healthcare professional. [address] any warranty or liability for your use of this information.

## 2018-04-24 NOTE — PROGRESS NOTES
_ [facility]  [email or web address]  [phone]    Madison Khan MD, FACOG     Follow-up OB visit    Chief Complaint   Patient presents with    Routine Prenatal Visit       Vitals:    18 1358   BP: 120/72   Weight: 188 lb (85.3 kg)   Height: 4' 11\" (1.499 m)       Patient Active Problem List    Diagnosis Date Noted    Prenatal care of multigravida, antepartum 10/28/2017    Pregnant 2016    Chronic migraine without aura without status migrainosus, not intractable 2015    Analgesic overuse headache 2015    Intractable migraine without aura 2015    Thyroid cancer ([facility] [address] 01/10/2013       The patient reports the following concerns: none    See PN flowsheet for exam    34 y.o.  38w5d   Encounter Diagnosis   Name Primary?  Prenatal care of multigravida, antepartum Yes        [] SAB/bleeding precautions reviewed   [] PTL/PPROM precautions reviewed   [x] Labor precautions reviewed   [x] Fetal kick counts discussed   [] Labs reviewed with patient   [] Aguirre Yvon precautions reviewed   [] Consent reviewed   [] Handouts given to pt   [] Glucola handout    [] GBS/labor/Magic Hour handout   []    []    []    []    Follow-up Disposition:  Return in about 1 week (around 2018) for Follow up OB visit. No orders of the defined types were placed in this encounter.       Madison Khan MD

## 2018-05-01 ENCOUNTER — ROUTINE PRENATAL (OUTPATIENT)
Dept: OBGYN CLINIC | Age: 33
End: 2018-05-01

## 2018-05-01 VITALS
WEIGHT: 184 LBS | BODY MASS INDEX: 37.09 KG/M2 | HEIGHT: 59 IN | DIASTOLIC BLOOD PRESSURE: 70 MMHG | SYSTOLIC BLOOD PRESSURE: 118 MMHG

## 2018-05-01 DIAGNOSIS — Z34.80 PRENATAL CARE OF MULTIGRAVIDA, ANTEPARTUM: Primary | ICD-10-CM

## 2018-05-01 NOTE — PROGRESS NOTES
[facility]  [email or web address]  [phone]    Manuel Du MD, FACOG     Follow-up OB visit    Chief Complaint   Patient presents with    Routine Prenatal Visit       Vitals:    18 1343   BP: 118/70   Weight: 184 lb (83.5 kg)   Height: 4' 11\" (1.499 m)       Patient Active Problem List    Diagnosis Date Noted    Prenatal care of multigravida, antepartum 10/28/2017    Pregnant 2016    Chronic migraine without aura without status migrainosus, not intractable 2015    Analgesic overuse headache 2015    Intractable migraine without aura 2015    Thyroid cancer ([facility] [address] 01/10/2013       The patient reports the following concerns: none, +FM    See PN flowsheet for exam    34 y.o.  39w5d   Encounter Diagnosis   Name Primary?  Prenatal care of multigravida, antepartum Yes     Labor prec  Disc rotation/call coverage     [] SAB/bleeding precautions reviewed   [] PTL/PPROM precautions reviewed   [] Labor precautions reviewed   [] Fetal kick counts discussed   [] Labs reviewed with patient   [] Alexandra Brownie precautions reviewed   [] Consent reviewed   [] Handouts given to pt   [] Glucola handout    [] GBS/labor/Magic Hour handout   []    []    []    []    Follow-up Disposition:  Return in about 1 week (around 2018) for Follow up OB visit. No orders of the defined types were placed in this encounter.       Manuel Du MD

## 2018-05-01 NOTE — MR AVS SNAPSHOT
[address] [facility] [address] [facility] 
[phone] Patient: Emani Rodriguez MRN: [id number] [facility]:3/73/4409 Visit Information Date & Time Provider Department Dept. Phone Encounter #  
 5/1/2018  2:00 PM MD Marco Antonio Miles [phone] Your Appointments 5/8/2018  1:40 PM  
ESTABLISHED PATIENT with MD Marco Antonio Bernard (Bethanne Ogren) Appt Note: 40 wks [address] [address]  
[address] 5/14/2018  1:30 PM  
ESTABLISHED PATIENT with MD Marco Antonio Miles (Betjoyane Ogren) Appt Note: 41 wks [address] [place] [address] [place] [address]  
[phone]  
  
   
 [address] [facility] [address] 70 [facility]  
  
    
  
 5/1/2018  2:00 PM  
OB VISIT with MD Marco Antonio Miles (Valariehanne Ogren) Appt Note: 1 wk (39)fob  
 [address] [address]  
[address] [facility] [address] 70 [facility] Upcoming Health Maintenance Date Due Influenza Age 5 to Adult 8/1/2018 PAP AKA CERVICAL CYTOLOGY 10/5/2020 Allergies as of 5/1/2018  Review Complete On: 5/1/2018 By: Juaquin Porter Severity Noted Reaction Type Reactions Amoxicillin  01/10/2013   Systemic Hives Current Immunizations  Reviewed on 5/2/2016 Name Date Influenza Vaccine Mace Payor) 11/4/2015 Tdap 2/20/2018, 2/10/2016 Not reviewed this visit Vitals BP Height(growth percentile) Weight(growth percentile) LMP Breastfeeding? BMI  
 118/70 4' 11\" (1.499 m) 184 lb (83.5 kg) 07/27/2017 No 37.16 kg/m2 OB Status Smoking Status Pregnant Current Every Day Smoker Vitals History BMI and BSA Data Body Mass Index Body Surface Area  
 37.16 kg/m 2 1.86 m 2 Preferred Pharmacy Pharmacy Name Phone [id number] [place], [place] - [address] [facility] [phone] Your Updated Medication List  
  
   
This list is accurate as of 18  1:50 PM.  Always use your most recent med list.  
  
  
  
  
 butalbital-acetaminophen-caffeine -40 mg per tablet Commonly known as:  Enio Devoid Take 1 Tab by mouth every six (6) hours as needed for Pain. PRENATAL VITAMIN PO Take  by mouth. * SYNTHROID 125 mcg tablet Generic drug:  levothyroxine TAKE 1 TABLET BY MOUTH EVERY DAY  
  
 * SYNTHROID 175 mcg tablet Generic drug:  levothyroxine TK 1 T PO D  
  
 * Notice: This list has 2 medication(s) that are the same as other medications prescribed for you. Read the directions carefully, and ask your doctor or other care provider to review them with you. Patient Instructions [employer] Desk: [phone] Week 39 of Your Pregnancy: Care Instructions Your Care Instructions During these final weeks, you may feel anxious to see your new baby. Commodore babies often look different from what you see in pictures or movies. Right after birth, their heads may have a strange shape. Their eyes may be puffy. And their genitals may be swollen. They may also have very dry skin, or red marks on the eyelids, nose, or neck. Still, most parents think their babies are beautiful. Follow-up care is a key part of your treatment and safety. Be sure to make and go to all appointments, and call your doctor if you are having problems. It's also a good idea to know your test results and keep a list of the medicines you take. How can you care for yourself at home? Prepare to breastfeed · If you are breastfeeding, continue to eat healthy foods. · Avoid alcohol, cigarettes, and drugs. This includes prescription and over-the-counter medicines. · You can help prevent sore nipples if you feed your baby in the correct position. Nurses will help you learn to do this. · Your  will need to be fed about every 1½ to 3 hours. Choose the right birth control after your baby is born · Women who are breastfeeding can still get pregnant. Use birth control if you don't want to get pregnant. · Intrauterine devices (IUDs) work for women who want to wait at least 2 years before getting pregnant again. They are safe to use while you are breastfeeding. · Depo-Provera can be used while you are breastfeeding. It is a shot you get every 3 months. · Birth control pills work well. But you need a different kind of pill while you are breastfeeding. And when you start taking these pills, you need to make sure to use another type of birth control until you start your second pack. · Diaphragms, cervical caps, tubal implants, and condoms with spermicide work less well after birth. If you have a diaphragm or cervical cap, you will need to have it refitted. · Tubal ligation (tying your tubes) and vasectomy are both permanent. These are good options if you are sure you are done having children. Where can you learn more? Go to http://jo ann-elizabeth.info/. Enter J974 in the search box to learn more about \"Week 39 of Your Pregnancy: Care Instructions. \" Current as of: 2017 Content Version: 11.4 © 1754-2345 Healthwise, Incorporated. Care instructions adapted under license by [employer] (which disclaims liability or warranty for this information). If you have questions about a medical condition or this instruction, always ask your healthcare professional. [place] [address] any warranty or liability for your use of this information. Introducing [facility] & HEALTH SERVICES! Eva Allen introduces [employer] patient portal. Now you can access parts of your medical record, email your doctor's office, and request medication refills online. 1. In your internet browser, go to [email or web address] 2. Click on the First Time User? Click Here link in the Sign In box. You will see the New Member Sign Up page. 3. Enter your [employer] Access Code exactly as it appears below. You will not need to use this code after youve completed the sign-up process. If you do not sign up before the expiration date, you must request a new code. · [employer] Access Code: [email or web address] Expires: 7/11/2018  8:04 AM 
 
4. Enter the last four digits of your Social Security Number (xxxx) and Date of Birth (mm/dd/yyyy) as indicated and click Submit. You will be taken to the next sign-up page. 5. Create a [employer] ID. This will be your [employer] login ID and cannot be changed, so think of one that is secure and easy to remember. 6. Create a [employer] password. You can change your password at any time. 7. Enter your Password Reset Question and Answer. This can be used at a later time if you forget your password. 8. Enter your e-mail address. You will receive e-mail notification when new information is available in [address] 9. Click Sign Up. You can now view and download portions of your medical record. 10. Click the Download Summary menu link to download a portable copy of your medical information. If you have questions, please visit the Frequently Asked Questions section of the [employer] website. Remember, [employer] is NOT to be used for urgent needs. For medical emergencies, dial 911. Now available from your iPhone and Android! Please provide this summary of care documentation to your next provider. Your primary care clinician is listed as Shaw Jimenez. If you have any questions after today's visit, please call [phone].

## 2018-05-01 NOTE — PATIENT INSTRUCTIONS
SlideMail Desk: [phone]       Week 44 of Your Pregnancy: Care Instructions  Your Care Instructions    During these final weeks, you may feel anxious to see your new baby.  babies often look different from what you see in pictures or movies. Right after birth, their heads may have a strange shape. Their eyes may be puffy. And their genitals may be swollen. They may also have very dry skin, or red marks on the eyelids, nose, or neck. Still, most parents think their babies are beautiful. Follow-up care is a key part of your treatment and safety. Be sure to make and go to all appointments, and call your doctor if you are having problems. It's also a good idea to know your test results and keep a list of the medicines you take. How can you care for yourself at home? Prepare to breastfeed  · If you are breastfeeding, continue to eat healthy foods. · Avoid alcohol, cigarettes, and drugs. This includes prescription and over-the-counter medicines. · You can help prevent sore nipples if you feed your baby in the correct position. Nurses will help you learn to do this. · Your  will need to be fed about every 1½ to 3 hours. Choose the right birth control after your baby is born  · Women who are breastfeeding can still get pregnant. Use birth control if you don't want to get pregnant. · Intrauterine devices (IUDs) work for women who want to wait at least 2 years before getting pregnant again. They are safe to use while you are breastfeeding. · Depo-Provera can be used while you are breastfeeding. It is a shot you get every 3 months. · Birth control pills work well. But you need a different kind of pill while you are breastfeeding. And when you start taking these pills, you need to make sure to use another type of birth control until you start your second pack. · Diaphragms, cervical caps, tubal implants, and condoms with spermicide work less well after birth.  If you have a diaphragm or cervical cap, you will need to have it refitted. · Tubal ligation (tying your tubes) and vasectomy are both permanent. These are good options if you are sure you are done having children. Where can you learn more? Go to http://jo ann-elizabeth.info/. Enter B561 in the search box to learn more about \"Week 39 of Your Pregnancy: Care Instructions. \"  Current as of: March 16, 2017  Content Version: 11.4  © 2398-1250 [employer]. Care instructions adapted under license by [employer] (which disclaims liability or warranty for this information). If you have questions about a medical condition or this instruction, always ask your healthcare professional. [address] any warranty or liability for your use of this information.

## 2018-05-08 ENCOUNTER — ROUTINE PRENATAL (OUTPATIENT)
Dept: OBGYN CLINIC | Age: 33
End: 2018-05-08

## 2018-05-08 VITALS — SYSTOLIC BLOOD PRESSURE: 112 MMHG | BODY MASS INDEX: 37.16 KG/M2 | WEIGHT: 184 LBS | DIASTOLIC BLOOD PRESSURE: 78 MMHG

## 2018-05-08 DIAGNOSIS — Z34.80 PRENATAL CARE OF MULTIGRAVIDA, ANTEPARTUM: Primary | ICD-10-CM

## 2018-05-08 NOTE — MR AVS SNAPSHOT
[address] [facility] [address] [facility] 
[phone] Patient: Kushal Fenton MRN: [id number] USP:5/94/2586 Visit Information Date & Time Provider Department Dept. Phone Encounter #  
 5/8/2018  1:40 PM MD Marco Antonio Donohue [phone] [id number] Your Appointments 5/14/2018  1:30 PM  
ESTABLISHED PATIENT with MD Marco Antonio Perez [facility] Appt Note: 41 wks [address] 100[address] [facility]  
[phone]  
  
   
 [address] [facility] [address] [place] [address]  
  
    
  
 5/8/2018  1:40 PM  
OB VISIT with MD Marco Antonio Donohue ([facility]) Appt Note: 40 wks  TP  PT's  
 [address] [facility] [address] [phone]  
[address] Upcoming Health Maintenance Date Due Influenza Age 5 to Adult 8/1/2018 PAP AKA CERVICAL CYTOLOGY 10/5/2020 Allergies as of 5/8/2018  Review Complete On: 5/8/2018 By: Davi Salmon Severity Noted Reaction Type Reactions Amoxicillin  01/10/2013   Systemic Hives Current Immunizations  Reviewed on 5/2/2016 Name Date Influenza Vaccine Radhamesffjasvir Horne) 11/4/2015 Tdap 2/20/2018, 2/10/2016 Not reviewed this visit Vitals BP Weight(growth percentile) LMP BMI OB Status Smoking Status 112/78 184 lb (83.5 kg) 07/27/2017 37.16 kg/m2 Pregnant Current Every Day Smoker BMI and BSA Data Body Mass Index Body Surface Area  
 37.16 kg/m 2 1.86 m 2 Preferred Pharmacy Pharmacy Name Phone [id number] [place], [place] - [address] [facility] [phone] Your Updated Medication List  
  
   
This list is accurate as of 5/8/18  1:39 PM.  Always use your most recent med list.  
  
  
  
  
 butalbital-acetaminophen-caffeine -40 mg per tablet Commonly known as:  Myrle Ely Take 1 Tab by mouth every six (6) hours as needed for Pain. PRENATAL VITAMIN PO Take  by mouth. * SYNTHROID 125 mcg tablet Generic drug:  levothyroxine TAKE 1 TABLET BY MOUTH EVERY DAY  
  
 * SYNTHROID 175 mcg tablet Generic drug:  levothyroxine TK 1 T PO D  
  
 * Notice: This list has 2 medication(s) that are the same as other medications prescribed for you. Read the directions carefully, and ask your doctor or other care provider to review them with you. Introducing [facility]! [address] [facility] introduces [employer] patient portal. Now you can access parts of your medical record, email your doctor's office, and request medication refills online. 1. In your internet browser, go to [email or web address] 2. Click on the First Time User? Click Here link in the Sign In box. You will see the New Member Sign Up page. 3. Enter your [employer] Access Code exactly as it appears below. You will not need to use this code after youve completed the sign-up process. If you do not sign up before the expiration date, you must request a new code. · [employer] Access Code: [email or web address] Expires: 7/11/2018  8:04 AM 
 
4. Enter the last four digits of your Social Security Number (xxxx) and Date of Birth (mm/dd/yyyy) as indicated and click Submit. You will be taken to the next sign-up page. 5. Create a [employer] ID. This will be your [employer] login ID and cannot be changed, so think of one that is secure and easy to remember. 6. Create a [employer] password. You can change your password at any time. 7. Enter your Password Reset Question and Answer. This can be used at a later time if you forget your password. 8. Enter your e-mail address. You will receive e-mail notification when new information is available in [address] 9. Click Sign Up. You can now view and download portions of your medical record. 10. Click the Download Summary menu link to download a portable copy of your medical information. If you have questions, please visit the Frequently Asked Questions section of the EverTrue website. Remember, EverTrue is NOT to be used for urgent needs. For medical emergencies, dial 911. Now available from your iPhone and Android! Please provide this summary of care documentation to your next provider. Your primary care clinician is listed as Roseanna Glass. If you have any questions after today's visit, please call [phone].

## 2018-05-14 ENCOUNTER — ANESTHESIA EVENT (OUTPATIENT)
Dept: LABOR AND DELIVERY | Age: 33
End: 2018-05-14
Payer: COMMERCIAL

## 2018-05-14 ENCOUNTER — ROUTINE PRENATAL (OUTPATIENT)
Dept: OBGYN CLINIC | Age: 33
End: 2018-05-14

## 2018-05-14 ENCOUNTER — ANESTHESIA (OUTPATIENT)
Dept: LABOR AND DELIVERY | Age: 33
End: 2018-05-14
Payer: COMMERCIAL

## 2018-05-14 ENCOUNTER — HOSPITAL ENCOUNTER (INPATIENT)
Age: 33
LOS: 3 days | Discharge: HOME | End: 2018-05-17
Attending: OBSTETRICS & GYNECOLOGY | Admitting: OBSTETRICS & GYNECOLOGY
Payer: COMMERCIAL

## 2018-05-14 VITALS
HEIGHT: 59 IN | BODY MASS INDEX: 37.09 KG/M2 | WEIGHT: 184 LBS | SYSTOLIC BLOOD PRESSURE: 112 MMHG | DIASTOLIC BLOOD PRESSURE: 74 MMHG

## 2018-05-14 DIAGNOSIS — O26.893 VAGINAL DISCHARGE DURING PREGNANCY IN THIRD TRIMESTER: ICD-10-CM

## 2018-05-14 DIAGNOSIS — Z34.80 PRENATAL CARE OF MULTIGRAVIDA, ANTEPARTUM: Primary | ICD-10-CM

## 2018-05-14 DIAGNOSIS — N89.8 VAGINAL DISCHARGE DURING PREGNANCY IN THIRD TRIMESTER: ICD-10-CM

## 2018-05-14 LAB
A1 MICROGLOB PLACENTAL VAG QL: POSITIVE
AMPHET UR QL SCN: NEGATIVE
BARBITURATES UR QL SCN: NEGATIVE
BASOPHILS # BLD: 0 K/UL (ref 0–0.1)
BASOPHILS NFR BLD: 0 % (ref 0–1)
BENZODIAZ UR QL: NEGATIVE
CANNABINOIDS UR QL SCN: NEGATIVE
COCAINE UR QL SCN: NEGATIVE
CONTROL LINE PRESENT?: NORMAL
DIFFERENTIAL METHOD BLD: ABNORMAL
DRUG SCRN COMMENT,DRGCM: NORMAL
EOSINOPHIL # BLD: 0.1 K/UL (ref 0–0.4)
EOSINOPHIL NFR BLD: 1 % (ref 0–7)
ERYTHROCYTE [DISTWIDTH] IN BLOOD BY AUTOMATED COUNT: 16 % (ref 11.5–14.5)
EXPIRATION DATE: NORMAL
FERN TEST, (POC): NORMAL
HCT VFR BLD AUTO: 35.1 % (ref 35–47)
HGB BLD-MCNC: 11.4 G/DL (ref 11.5–16)
IMM GRANULOCYTES # BLD: 0.1 K/UL (ref 0–0.04)
IMM GRANULOCYTES NFR BLD AUTO: 1 % (ref 0–0.5)
INTERNAL NEGATIVE CONTROL: NORMAL
KIT LOT NO.: NORMAL
LYMPHOCYTES # BLD: 2.5 K/UL (ref 0.8–3.5)
LYMPHOCYTES NFR BLD: 24 % (ref 12–49)
MCH RBC QN AUTO: 28.8 PG (ref 26–34)
MCHC RBC AUTO-ENTMCNC: 32.5 G/DL (ref 30–36.5)
MCV RBC AUTO: 88.6 FL (ref 80–99)
METHADONE UR QL: NEGATIVE
MONOCYTES # BLD: 0.7 K/UL (ref 0–1)
MONOCYTES NFR BLD: 7 % (ref 5–13)
NEUTS SEG # BLD: 7.1 K/UL (ref 1.8–8)
NEUTS SEG NFR BLD: 68 % (ref 32–75)
NRBC # BLD: 0 K/UL (ref 0–0.01)
NRBC BLD-RTO: 0 PER 100 WBC
OPIATES UR QL: NEGATIVE
PCP UR QL: NEGATIVE
PLATELET # BLD AUTO: 398 K/UL (ref 150–400)
PMV BLD AUTO: 8.7 FL (ref 8.9–12.9)
RBC # BLD AUTO: 3.96 M/UL (ref 3.8–5.2)
WBC # BLD AUTO: 10.4 K/UL (ref 3.6–11)
WET MOUNT POCT, WMPOCT: NORMAL

## 2018-05-14 PROCEDURE — 84112 EVAL AMNIOTIC FLUID PROTEIN: CPT | Performed by: OBSTETRICS & GYNECOLOGY

## 2018-05-14 PROCEDURE — 75410000002 HC LABOR FEE PER 1 HR: Performed by: OBSTETRICS & GYNECOLOGY

## 2018-05-14 PROCEDURE — 4A1H74Z MONITORING OF PRODUCTS OF CONCEPTION, CARDIAC ELECTRICAL ACTIVITY, VIA NATURAL OR ARTIFICIAL OPENING: ICD-10-PCS | Performed by: OBSTETRICS & GYNECOLOGY

## 2018-05-14 PROCEDURE — 76060000078 HC EPIDURAL ANESTHESIA: Performed by: ANESTHESIOLOGY

## 2018-05-14 PROCEDURE — 65270000029 HC RM PRIVATE

## 2018-05-14 PROCEDURE — 80307 DRUG TEST PRSMV CHEM ANLYZR: CPT | Performed by: OBSTETRICS & GYNECOLOGY

## 2018-05-14 PROCEDURE — 75410000000 HC DELIVERY VAGINAL/SINGLE: Performed by: OBSTETRICS & GYNECOLOGY

## 2018-05-14 PROCEDURE — 74011250636 HC RX REV CODE- 250/636: Performed by: OBSTETRICS & GYNECOLOGY

## 2018-05-14 PROCEDURE — 36415 COLL VENOUS BLD VENIPUNCTURE: CPT | Performed by: OBSTETRICS & GYNECOLOGY

## 2018-05-14 PROCEDURE — 77030014125 HC TY EPDRL BBMI -B: Performed by: ANESTHESIOLOGY

## 2018-05-14 PROCEDURE — 85025 COMPLETE CBC W/AUTO DIFF WBC: CPT | Performed by: OBSTETRICS & GYNECOLOGY

## 2018-05-14 PROCEDURE — 74011000250 HC RX REV CODE- 250

## 2018-05-14 PROCEDURE — 74011250636 HC RX REV CODE- 250/636: Performed by: ANESTHESIOLOGY

## 2018-05-14 PROCEDURE — 75410000003 HC RECOV DEL/VAG/CSECN EA 0.5 HR: Performed by: OBSTETRICS & GYNECOLOGY

## 2018-05-14 RX ORDER — BUTORPHANOL TARTRATE 1 MG/ML
1 INJECTION INTRAMUSCULAR; INTRAVENOUS
Status: DISCONTINUED | OUTPATIENT
Start: 2018-05-14 | End: 2018-05-15

## 2018-05-14 RX ORDER — ACETAMINOPHEN 325 MG/1
650 TABLET ORAL
Status: DISCONTINUED | OUTPATIENT
Start: 2018-05-14 | End: 2018-05-15 | Stop reason: SDUPTHER

## 2018-05-14 RX ORDER — FENTANYL/BUPIVACAINE/NS/PF 2-1250MCG
1-16 PREFILLED PUMP RESERVOIR EPIDURAL CONTINUOUS
Status: DISCONTINUED | OUTPATIENT
Start: 2018-05-14 | End: 2018-05-15

## 2018-05-14 RX ORDER — SODIUM CHLORIDE, SODIUM LACTATE, POTASSIUM CHLORIDE, CALCIUM CHLORIDE 600; 310; 30; 20 MG/100ML; MG/100ML; MG/100ML; MG/100ML
125 INJECTION, SOLUTION INTRAVENOUS CONTINUOUS
Status: DISCONTINUED | OUTPATIENT
Start: 2018-05-14 | End: 2018-05-15

## 2018-05-14 RX ORDER — OXYTOCIN IN 5 % DEXTROSE 30/500 ML
.5-42 PLASTIC BAG, INJECTION (ML) INTRAVENOUS
Status: DISCONTINUED | OUTPATIENT
Start: 2018-05-14 | End: 2018-05-15

## 2018-05-14 RX ORDER — EPHEDRINE SULFATE 50 MG/ML
10 INJECTION, SOLUTION INTRAVENOUS
Status: DISCONTINUED | OUTPATIENT
Start: 2018-05-14 | End: 2018-05-15

## 2018-05-14 RX ORDER — MAG HYDROX/ALUMINUM HYD/SIMETH 200-200-20
30 SUSPENSION, ORAL (FINAL DOSE FORM) ORAL
Status: DISCONTINUED | OUTPATIENT
Start: 2018-05-14 | End: 2018-05-15

## 2018-05-14 RX ORDER — NALOXONE HYDROCHLORIDE 0.4 MG/ML
0.4 INJECTION, SOLUTION INTRAMUSCULAR; INTRAVENOUS; SUBCUTANEOUS AS NEEDED
Status: DISCONTINUED | OUTPATIENT
Start: 2018-05-14 | End: 2018-05-15 | Stop reason: SDUPTHER

## 2018-05-14 RX ORDER — ONDANSETRON 2 MG/ML
4 INJECTION INTRAMUSCULAR; INTRAVENOUS
Status: DISCONTINUED | OUTPATIENT
Start: 2018-05-14 | End: 2018-05-15

## 2018-05-14 RX ORDER — SODIUM CHLORIDE 0.9 % (FLUSH) 0.9 %
5-10 SYRINGE (ML) INJECTION EVERY 8 HOURS
Status: DISCONTINUED | OUTPATIENT
Start: 2018-05-14 | End: 2018-05-15

## 2018-05-14 RX ORDER — SODIUM CHLORIDE 0.9 % (FLUSH) 0.9 %
5-10 SYRINGE (ML) INJECTION AS NEEDED
Status: DISCONTINUED | OUTPATIENT
Start: 2018-05-14 | End: 2018-05-15

## 2018-05-14 RX ORDER — BUPIVACAINE HYDROCHLORIDE 2.5 MG/ML
INJECTION, SOLUTION EPIDURAL; INFILTRATION; INTRACAUDAL AS NEEDED
Status: DISCONTINUED | OUTPATIENT
Start: 2018-05-14 | End: 2018-05-15 | Stop reason: HOSPADM

## 2018-05-14 RX ORDER — LEVOTHYROXINE SODIUM 150 UG/1
150 TABLET ORAL
Status: DISCONTINUED | OUTPATIENT
Start: 2018-05-15 | End: 2018-05-17 | Stop reason: HOSPADM

## 2018-05-14 RX ADMIN — FENTANYL 0.2 MG/100ML-BUPIV 0.125%-NACL 0.9% EPIDURAL INJ 10 ML/HR: 2/0.125 SOLUTION at 22:29

## 2018-05-14 RX ADMIN — BUPIVACAINE HYDROCHLORIDE 10 ML: 2.5 INJECTION, SOLUTION EPIDURAL; INFILTRATION; INTRACAUDAL at 22:19

## 2018-05-14 RX ADMIN — Medication 2 MILLI-UNITS/MIN: at 17:57

## 2018-05-14 RX ADMIN — SODIUM CHLORIDE, POTASSIUM CHLORIDE, SODIUM LACTATE AND CALCIUM CHLORIDE 250 ML: 600; 310; 30; 20 INJECTION, SOLUTION INTRAVENOUS at 21:00

## 2018-05-14 RX ADMIN — SODIUM CHLORIDE, SODIUM LACTATE, POTASSIUM CHLORIDE, AND CALCIUM CHLORIDE 125 ML/HR: 600; 310; 30; 20 INJECTION, SOLUTION INTRAVENOUS at 16:11

## 2018-05-14 RX ADMIN — Medication 6 MILLI-UNITS/MIN: at 20:02

## 2018-05-14 NOTE — PROGRESS NOTES
[address] [facility]  [email or web address]/  [phone]    Krystyna Ingram MD, FACOG       OB/GYN: Ja Guillen Problem visit    Chief Complaint:   Chief Complaint   Patient presents with    Routine Prenatal Visit       Patient Active Problem List    Diagnosis    Labor and delivery, indication for care    Prenatal care of multigravida, antepartum     H/o thyorid cancer, s/p thyroidectomy on synthroid  Post plac  NT: low risk/ AFP: negative  Flu done out of office  17 normal ur cx  XX #2  18 hgb #10.3  Alexandra Flash  Peds: [facility]: NP.  +BF  Bring thyroid meds to hospital        Pregnant    Chronic migraine without aura without status migrainosus, not intractable    Analgesic overuse headache    Intractable migraine without aura    Thyroid cancer ([facility] [address]     papillary         History of Present Illness: The patient is a 34 y.o.  female who reports she felt a gush of fluid on Saturday, and has had continued discharge/leakage since then. Pt states she has changed her pad 3 times today. Pt states she has had some irregular contractions/ramesh dillon on and off for the past few days. This is a new problem. This is a routinely scheduled OB appointment. She reports the symptoms are is unchanged. Aggravating factors include none. Alleviating factors include none. She does not have other concerns. 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  Muscle pain     Pap smear for cervical cancer screening 14    negative    Psychiatric problem     Anxiety    Shoulder bursitis     right    Thyroid cancer ([facility] [address] 1/10/2013    Complete Thyroidectomy .      Past Surgical History:   Procedure Laterality Date    HX LITHOTRIPSY      HX ORTHOPAEDIC Left     metal plate/ 7screwes in Left wrist; Broken ulna and radius.  HX THYROIDECTOMY  10/2010    total     Family History   Problem Relation Age of Onset    Elevated Lipids Mother     Heart Disease Father     Cancer Other     Headache Other     Dementia Other     MS Other     Parkinson's Disease Other     Breast Cancer Other      paternal great aunt    Breast Cancer Paternal Grandmother     Heart Disease Maternal Grandmother     Heart Disease Maternal Grandfather     MS Maternal Aunt      Social History   Substance Use Topics    Smoking status: Current Every Day Smoker     Packs/day: 0.50    Smokeless tobacco: Never Used      Comment: Never used vapor or e-cigs     Alcohol use No     Allergies   Allergen Reactions    Amoxicillin Hives     No current outpatient prescriptions on file. No current facility-administered medications for this visit.         Review of Systems:  History obtained from the patient and written ROS questionnaire  Constitutional: negative for fevers, chills and weight loss  ENT ROS: negative for - hearing change, oral lesions or visual changes  Respiratory: negative for cough, wheezing or dyspnea on exertion  Cardiovascular: negative for chest pain, irregular heart beats, exertional chest pressure/discomfort  Gastrointestinal: negative for dysphagia, nausea and vomiting  Genito-Urinary ROS: , see HPI  Inteument/breast: negative for rash, breast lump and nipple discharge  Musculoskeletal:negative for stiff joints, neck pain and muscle weakness  Endocrine ROS: negative for - breast changes, galactorrhea or temperature intolerance  Hematological and Lymphatic ROS: negative for - blood clots, bruising or swollen lymph nodes    Physical Exam:  Visit Vitals    /74    Ht 4' 11\" (1.499 m)    Wt 184 lb (83.5 kg)    BMI 37.16 kg/m2       GENERAL: alert, well appearing, and in no distress  HEAD; normocephalic, atraumatic  ABDOMEN: soft, nontender, nondistended, no masses or organomegaly   BACK: normal range of motion, no tenderness, no CVAT   EGBUS: no lesions, no inflammation, no masses  VULVA: normal appearing vulva with no masses, tenderness or lesions  VAGINA: normal appearing vagina with normal color, no lesions, thin white discharge  No pooling  ntz neg  CERVIX: normal appearing cervix without discharge or lesions, non tender  UTERUS: uterus is enlarged in size, gravid appropriate for gestational age  ADNEXA: normal adnexa in size, nontender and no masses  NEURO: alert, oriented, normal speech    See PN flowsheet for additional notes and exam    Assessment:  34 y.o.  41w4d   Encounter Diagnoses   Name Primary?  Prenatal care of multigravida, antepartum Yes    Vaginal discharge during pregnancy in third trimester        Plan:  An evaluation of this patient's concern is planned. The patient is advised that she should contact the office if she does not note improvement or if symptoms recur  She should contact our office with any questions or concerns  She could keep her routine OB appointment. Disc options, plan to l and d for IOL  Disc r/b/a of induction and pitocin use and increase risk of longer labor,  section, bleeding and infection.        Orders Placed This Encounter    AMB POC WET PREP (AKA STAIN, INTERPRET, WET MOUNT)    AMB POC FERN TEST       Results for orders placed or performed in visit on 18   AMB POC SMEAR, STAIN & INTERPRET, WET MOUNT   Result Value Ref Range    Wet mount (POC)      Narrative    EBER    Hypae: negative  Buds: negative    Wet Prep:  Trich: negative  Clue cells: negative  Hyphae: negative  Buds: negative  WBC's: normal     AMB POC FERN TEST   Result Value Ref Range    FERN test, (POC)      Narrative    Negative, no ferning seen       Evon Culver MD    Physician review of ultrasound performed by technician    Today's ultrasound report and images were reviewed and discussed with the patient. Please see images and imaging report entered by technician in PACS for more detail and progress note and diagnosis entered by MD.    Griselda Campi, MD    BPP/LIMITED OB SCAN  A SINGLE VERTEX 41W4D IUP IS SEEN. FETAL CARDIAC MOTION OBSERVED. LIMITED ANATOMY WAS VISUALIZED AND APPEARS WNL. APPROPRIATE FETAL GROWTH IS SEEN. SIZE = DATES. JULI AND PLACENTA APPEAR WNL.   BPP SCORE: 8/8

## 2018-05-14 NOTE — MR AVS SNAPSHOT
[address] [facility] [address] [facility] 
[phone] Patient: Susanna Duckworth MRN: [id number] WJW:[birth year] Visit Information Date & Time Provider Department Dept. Phone Encounter #  
 5/14/2018  2:30 PM MD Marco Antonio Cannon [id number] Upcoming Health Maintenance Date Due Influenza Age 5 to Adult 8/1/2018 PAP AKA CERVICAL CYTOLOGY 10/5/2020 Allergies as of 5/14/2018  Review Complete On: 5/14/2018 By: Magaly Matias LPN Severity Noted Reaction Type Reactions Amoxicillin  01/10/2013   Systemic Hives Current Immunizations  Reviewed on 5/2/2016 Name Date Influenza Vaccine Haven Sallies) 11/4/2015 Tdap 2/20/2018, 2/10/2016 Not reviewed this visit Vitals BP Height(growth percentile) Weight(growth percentile) LMP BMI OB Status 112/74 4' 11\" (1.499 m) 184 lb (83.5 kg) 07/27/2017 37.16 kg/m2 Pregnant Smoking Status Former Smoker BMI and BSA Data Body Mass Index Body Surface Area  
 37.16 kg/m 2 1.86 m 2 Preferred Pharmacy [facility]acy Name Phone [id number] [place], [place] - [address] [facility] [phone] Your Updated Medication List  
  
   
This list is accurate as of 5/14/18  2:49 PM.  Always use your most recent med list.  
  
  
  
  
 butalbital-acetaminophen-caffeine -40 mg per tablet Commonly known as:  Maryelizabeth Moat Take 1 Tab by mouth every six (6) hours as needed for Pain. PRENATAL VITAMIN PO Take  by mouth. * SYNTHROID 125 mcg tablet Generic drug:  levothyroxine TAKE 1 TABLET BY MOUTH EVERY DAY  
  
 * SYNTHROID 175 mcg tablet Generic drug:  levothyroxine TK 1 T PO D  
  
 * Notice: This list has 2 medication(s) that are the same as other medications prescribed for you.  Read the directions carefully, and ask your doctor or other care provider to review them with you. Patient Instructions Week 41 of Your Pregnancy: Care Instructions Your Care Instructions By now, you're probably tired of people asking you when the baby is going to be born. Your baby could come any Lakesha Constable today! Your doctor wants to make sure that you have a safe birth and so may recommend giving you medicines to start labor or scheduling a  delivery. Most women who give birth after their due dates have healthy newborns. But you may have tests to make sure everything is okay. This care sheet will help you prepare for giving birth after 41 weeks. Follow-up care is a key part of your treatment and safety. Be sure to make and go to all appointments, and call your doctor if you are having problems. It's also a good idea to know your test results and keep a list of the medicines you take. How can you care for yourself at home? At about 41 weeks · Your doctor will measure the amount of fluid surrounding the baby and test your baby's movement and heart rate. · If your baby seems strong and well, your doctor might give you medicine to start labor. · If there are other concerns, your doctor may tell you that a  delivery would be best for you and your baby. After 42 weeks · Your baby may be harder to deliver. · The placenta may no longer be able to meet your baby's needs. · The baby might have a bowel movement into the amniotic fluid, which could go into the baby's lungs. Ease or reduce swelling in your feet, ankles, hands, and fingers · If your fingers are puffy, take off your rings. · Do not eat high-salt foods, such as potato chips. · Prop up your feet on a stool or couch as much as possible. Sleep with pillows under your feet. · Do not stand for long periods of time or wear tight shoes. · Wear support stockings. Where can you learn more? Go to http://jo ann-elizabeth.info/. Enter E229 in the search box to learn more about \"Week 41 of Your Pregnancy: Care Instructions. \" Current as of: March 16, 2017 Content Version: 11.4 © 0684-3122 [employer]. Care instructions adapted under license by [employer] (which disclaims liability or warranty for this information). If you have questions about a medical condition or this instruction, always ask your healthcare professional. [place] [address] any warranty or liability for your use of this information. Introducing [facility]! Ronit Dumont introduces [employer] patient portal. Now you can access parts of your medical record, email your doctor's office, and request medication refills online. 1. In your internet browser, go to [email or web address] 2. Click on the First Time User? Click Here link in the Sign In box. You will see the New Member Sign Up page. 3. Enter your [employer] Access Code exactly as it appears below. You will not need to use this code after youve completed the sign-up process. If you do not sign up before the expiration date, you must request a new code. · [employer] Access Code: [email or web address] Expires: 7/11/2018  8:04 AM 
 
4. Enter the last four digits of your Social Security Number (xxxx) and Date of Birth (mm/dd/yyyy) as indicated and click Submit. You will be taken to the next sign-up page. 5. Create a [employer] ID. This will be your [employer] login ID and cannot be changed, so think of one that is secure and easy to remember. 6. Create a [employer] password. You can change your password at any time. 7. Enter your Password Reset Question and Answer. This can be used at a later time if you forget your password. 8. Enter your e-mail address. You will receive e-mail notification when new information is available in [address] 9. Click Sign Up. You can now view and download portions of your medical record. 10. Click the Download Summary menu link to download a portable copy of your medical information. If you have questions, please visit the Frequently Asked Questions section of the Helix[employer] website. Remember, [employer] is NOT to be used for urgent needs. For medical emergencies, dial 911. Now available from your iPhone and Android! Please provide this summary of care documentation to your next provider. Your primary care clinician is listed as Yomi Mcclellan. If you have any questions after today's visit, please call [phone].

## 2018-05-14 NOTE — H&P
History & Physical    Name: Turner Pop MRN: [id number]  SSN: [id number]    YOB: 1985  Age: 34 y.o. Sex: female        Subjective:     Estimated Date of Delivery: 5/3/18  OB History      Para Term  AB Living    2 1 1 0 0 1    SAB TAB Ectopic Molar Multiple Live Births    0 0 0  0 1        Obstetric Comments    Pushed four hour hours. VAVD x1 no pop off   [place], Dr. Isreal Varela           Ms. Elva Segundo is admitted with pregnancy at 41w4d for induction of labor. Prenatal course was complicated by post dates and VD x 2 days. Please see prenatal records for details. Pt c/o LOF since presenting to L and D. One gus on L and D. Past Medical History:   Diagnosis Date    Abnormal Pap smear of cervix 10/05/2017    Negative, HR HPV+, 16+, 18 negative    Anxiety     Arthritis     Bursitis     and scapulothoracic dyskinesia, right shoulder    Fatigue     Headache     Joint pain     Kidney stones     Memory loss     Due to Radiation from thryoid cancer in .  Muscle pain     Pap smear for cervical cancer screening 14    negative    Psychiatric problem     Anxiety    Shoulder bursitis     right    [facility]roid cancer ([facility] [address] 1/10/2013    Complete Thyroidectomy . Past Surgical History:   Procedure Laterality Date    HX LITHOTRIPSY      HX ORTHOPAEDIC Left     metal plate/ 7screwes in Left wrist; Broken ulna and radius.  HX THYROIDECTOMY  10/2010    total     Social History     Occupational History    Not on file.      Social History Main Topics    Smoking status: Current Every Day Smoker     Packs/day: 0.50    Smokeless tobacco: Never Used      Comment: Never used vapor or e-cigs     Alcohol use No    Drug use: No      Comment: pt smoked marijuana in the past    Sexual activity: Yes     Partners: Male     Birth control/ protection: None     Family History   Problem Relation Age of Onset    Elevated Lipids Mother     Heart Disease Father     Cancer Other     Headache Other     Dementia Other     MS Other     Parkinson's Disease Other     Breast Cancer Other      paternal great aunt    Breast Cancer Paternal Grandmother     Heart Disease Maternal Grandmother     Heart Disease Maternal Grandfather     MS Maternal Aunt        Allergies   Allergen Reactions    Amoxicillin Hives     Prior to Admission medications    Medication Sig Start Date End Date Taking? Authorizing Provider   SYNTHROID 175 mcg tablet TK 1 T PO D 1/3/18  Yes Historical Provider   PRENATAL VIT W-CA,FE,FA,<1 MG, (PRENATAL VITAMIN PO) Take  by mouth. Yes Historical Provider   butalbital-acetaminophen-caffeine (FIORICET, ESGIC) -40 mg per tablet Take 1 Tab by mouth every six (6) hours as needed for Pain. 1/9/18   Lay Trimble MD   SYNTHROID 125 mcg tablet TAKE 1 TABLET BY MOUTH EVERY DAY 8/25/16   Historical Provider        Active Hospital Problems    Diagnosis Date Noted    Labor and delivery, indication for care 05/14/2018       Review of Systems: A comprehensive review of systems was negative except for that written in the HPI.   Constitutional: negative for fevers, chills and weight loss  ENT ROS: negative for - hearing change, oral lesions or visual changes  Respiratory: negative for cough, wheezing or dyspnea on exertion  Cardiovascular: negative for chest pain, irregular heart beats, exertional chest pressure/discomfort  Gastrointestinal: negative for dysphagia, nausea and vomiting  Genito-Urinary ROS: see HPI  Inteument/breast: negative for rash, breast lump and nipple discharge  Musculoskeletal:negative for stiff joints, neck pain and muscle weakness  Endocrine ROS: negative for - breast changes, galactorrhea or temperature intolerance  Hematological and Lymphatic ROS: negative for - bruising or swollen lymph nodes        Objective:     Vitals:  Vitals:    05/14/18 1609 05/14/18 1614 05/14/18 1619 05/14/18 1624   BP:       Pulse:       Resp:       Temp:       SpO2: 97% 96% 97% 96%   Weight:       Height:          Visit Vitals    /70    Pulse (!) 105    Temp 98.4 °F (36.9 °C)    Resp 16    Ht 4' 11\" (1.499 m)    Wt 184 lb (83.5 kg)    SpO2 96%    Breastfeeding Yes    BMI 37.16 kg/m2         Physical Exam:  Patient without distress. Heart: Regular rate and rhythm or S1S2 present  Lung: clear to auscultation throughout lung fields, no wheezes, no rales, no rhonchi and normal respiratory effort  Abdomen: soft, nontender  Fundus: soft and non tender  Cervical Exam: 50/3/-3 vtx  Pelvimetry: hollow sacrum, non prominent ischial spines, subpubic angle >90 degrees, parallel vaginal sidewalls  EFW 8 3/4  Lower Extremities:  - no c/t/ 1+ LE edema  Membranes:  negative work up in office, but +amnisure on L and D. Prenatal Labs:   Lab Results   Component Value Date/Time    Rubella, External immune 10/07/2017    GrBStrep, External Negative 2018    HBsAg, External negative 10/07/2017    HIV, External non reactive 10/07/2017    Gonorrhea, External Negative 2015    Chlamydia, External Negative 2015        Assessment/Plan:   34 y.o.  41w4d  SROM 3pm no s/sx IAI  GBS Negative  Reassuring fetal surveillance    Plan: Admit for IOL. CEFM/TOCO  Disc with pt plan for AOL with pitocin and risks for IAI    Signed By:  Manohar Hummel MD     May 14, 2018       WBC   Date Value Ref Range Status   2018 10.4 3.6 - 11.0 K/uL Final     Comment:     Due to mathematical rounding between the [address], and the new e-SENS Hematology analyzers, the reported automated differential may vary by up to +/- 0.5% per cell line. This finding may produce a result that is 100% +/- 3%, which is clinically insignificant.        RBC   Date Value Ref Range Status   2018 3.96 3.80 - 5.20 M/uL Final     HGB   Date Value Ref Range Status   2018 11.4 (L) 11.5 - 16.0 g/dL Final     HCT   Date Value Ref Range Status   2018 35.1 35.0 - 47.0 % Final     PLATELET   Date Value Ref Range Status   05/14/2018 398 150 - 400 K/uL Final     Hgb, External   Date Value Ref Range Status   02/07/2018 10.3  Final     Hct, External   Date Value Ref Range Status   02/07/2018 31.3  Final     Platelet cnt., External   Date Value Ref Range Status   02/07/2018 379  Final         NST Inpatient procedure note    Isaias Charles presents for fetal non-stress test.  LMP was on   Indication is SROM. She is 41w4d. She has been monitored for ? 40 minutes. The FHR was reactive. NST Interpretation:    FHR baseline 130 bpm, variability moderate. Accelerations present. Decelerations Absent. Uterine contractions were present. irregular    Assessment    NST is reactive. NST is reassuring. Patient does need admission/observation for further monitoring. Saima mcclure was informed of the NST results and her questions were answered.     Plan:   Plan IOL

## 2018-05-14 NOTE — PROGRESS NOTES
16:17Rodney Tirado MD notified pt amnisure positive. Pt to be admitted and MD states she will finish up in clinic and come to speak with pt. No orders given at this time. 16:36- Verified with pt time of SROM approx 15:00 when she arrived to L&D  18:12- Pt supplied synthroid tubed down to pharmacy for verification  19:09- OB SBAR report given to Cecillia Seip.

## 2018-05-14 NOTE — PATIENT INSTRUCTIONS
Week 41 of Your Pregnancy: Care Instructions  Your Care Instructions    By now, you're probably tired of people asking you when the baby is going to be born. Your baby could come any Dorcus Esteban today! Your doctor wants to make sure that you have a safe birth and so may recommend giving you medicines to start labor or scheduling a  delivery. Most women who give birth after their due dates have healthy newborns. But you may have tests to make sure everything is okay. This care sheet will help you prepare for giving birth after 41 weeks. Follow-up care is a key part of your treatment and safety. Be sure to make and go to all appointments, and call your doctor if you are having problems. It's also a good idea to know your test results and keep a list of the medicines you take. How can you care for yourself at home? At about 41 weeks  · Your doctor will measure the amount of fluid surrounding the baby and test your baby's movement and heart rate. · If your baby seems strong and well, your doctor might give you medicine to start labor. · If there are other concerns, your doctor may tell you that a  delivery would be best for you and your baby. After 42 weeks  · Your baby may be harder to deliver. · The placenta may no longer be able to meet your baby's needs. · The baby might have a bowel movement into the amniotic fluid, which could go into the baby's lungs. Ease or reduce swelling in your feet, ankles, hands, and fingers  · If your fingers are puffy, take off your rings. · Do not eat high-salt foods, such as potato chips. · Prop up your feet on a stool or couch as much as possible. Sleep with pillows under your feet. · Do not stand for long periods of time or wear tight shoes. · Wear support stockings. Where can you learn more? Go to http://jo ann-elizabeth.info/.   Enter Q699 in the search box to learn more about \"Week 41 of Your Pregnancy: Care Instructions. \"  Current as of: March 16, 2017  Content Version: 11.4  © 1644-5127 [employer]wise, [facility]. Care instructions adapted under license by [employer] (which disclaims liability or warranty for this information). If you have questions about a medical condition or this instruction, always ask your healthcare professional. [place] [address] any warranty or liability for your use of this information.

## 2018-05-14 NOTE — IP AVS SNAPSHOT
[address] 
 
 
 [address] [facility] [address] [facility] 
[phone] Patient: Amparo Gamboa MRN: [id number] FLORES:[birth year] About your hospitalization You were admitted on:  May 14, 2018 [facility] the:  [facility] [address] You were discharged on:  May 17, 2018 Why you were hospitalized Your primary diagnosis was:  Not on File Your diagnoses also included:  Labor And Delivery, Indication For Care Follow-up Information Follow up With Details Comments Contact Info  
 home medication  Please return any remaining patient supplied Synthroid medication to patient at discharge Gaudencio Zamora MD In 6 weeks Postpartum visit [address] [facility] [address] [facility] [address] [facility] [address]7 [facility] 
[phone] Karon Hendrix MD   [place] [address] [address] [facility] [address] 
[phone] In 6 weeks Discharge Orders None A check karishma indicates which time of day the medication should be taken. My Medications START taking these medications Instructions Each Dose to Equal  
 Morning Noon Evening Bedtime  
 ibuprofen 600 mg tablet Commonly known as:  MOTRIN Your last dose was: Your next dose is: Take 1 Tab by mouth every six (6) hours as needed for Pain. Take with food. 600 mg  
    
   
   
   
  
 oxyCODONE-acetaminophen 5-325 mg per tablet Commonly known as:  PERCOCET Your last dose was: Your next dose is:    
   
   
 1-2 Tablets every 4-6 hours as needed CONTINUE taking these medications Instructions Each Dose to Equal  
 Morning Noon Evening Bedtime PRENATAL VITAMIN PO Your last dose was: Your next dose is: Take  by mouth. ASK your doctor about these medications  Instructions Each Dose to Equal  
 Morning Noon Evening Bedtime  
 butalbital-acetaminophen-caffeine -40 mg per tablet Commonly known as:  [place] Click Your last dose was: Your next dose is: Take 1 Tab by mouth every six (6) hours as needed for Pain. 1 Tab  
    
   
   
   
  
 * SYNTHROID 125 mcg tablet Generic drug:  levothyroxine Your last dose was: Your next dose is: TAKE 1 TABLET BY MOUTH EVERY DAY  
     
   
   
   
  
 * SYNTHROID 175 mcg tablet Generic drug:  levothyroxine Your last dose was: Your next dose is:    
   
   
 TK 1 T PO D  
     
   
   
   
  
 * Notice: This list has 2 medication(s) that are the same as other medications prescribed for you. Read the directions carefully, and ask your doctor or other care provider to review them with you. Where to Get Your Medications These medications were sent to [facility]/pharmacy #[id number]- [place], [place] - Via "[employer] [address],  [place] [address] Phone:  [phone]  
  ibuprofen 600 mg tablet Information on where to get these meds will be given to you by the nurse or doctor. ! Ask your nurse or doctor about these medications  
  oxyCODONE-acetaminophen 5-325 mg per tablet Opioid Education Prescription Opioids: What You Need to Know: 
 
 
POST DELIVERY DISCHARGE INSTRUCTIONS 
FROM YOUR PHYSICIAN Name: Lavelle Arroyo YOB: 1985 General:  
 
Read all discharge information provided by the hospital 
 
Diet/Diet Restrictions: Eat healthy meals and snacks as desired. Eat foods that are high in fiber and low in fat and cholesterol. Drink eight 8-ounce glasses of water daily; avoid excessive caffeine intake. [email or web address] 
[email or web address] Medications:  
See discharge medication list and read instructions carefully. Breast Feeding: 
See instructions from your lactation consult. Call [phone] for more information or to locate a lactation consultant. [email or web address].[email or web address].[email or web address] Vaccines: If you received the MMR vaccine postpartum you should wait three months until you get pregnant again. You, and close contacts, should make sure that the Tdap vaccine is up to date. This vaccine can decrease the risk of your baby getting pertussis or \"whooping cough. \" You, and close contacts, should receive the influenza vaccine during flu season when appropriate. [email or web address].[email or web address] Tobacco Use: If you (or other people around the baby) smoke or use tobacco products, please try to  use and quit to improve your health and decrease risk to your baby. [email or web address]. htm Swelling in your Legs: 
There are many fluid changes after delivery and you may have more swelling the first few days after delivery  Continue to drink plenty of water, avoid sitting or standing in one position for too long and elevate your feet above your heart, to help reduce some the pressure you may be feeling in your ankles and legs.  Section Incision: 
Steri-strips or tape strips may be removed gently at home approximately 7- 10 days after surgery. Soaking the strips with a warm, wet cloth or taking a shower may make the strips easier to remove.    
Metal staples are usually removed within 3 to 10 days, either before you leave the hospital or in the office. Make an appointment if needed. Insorb absorbable staples may be used under the skin but you may see small white pieces as they dissolve. Skin glue or dermabond will fall off with time. Abdominal incisions should be kept clean by showering. It is not necessary to put soap on the incision; plain tap water is adequate. Avoid scrubbing the area and pat dry. The way your scar looks will change over time and may not reach its final appearance for up to a year. The area may feel either numb or sensitive to touch, which is normal. 
 
    
Physical Activity / Restrictions / Safety:  
 
Avoid heavy lifting, no more that 10 pounds, for 2-3 weeks. No driving while taking narcotic pain medication, of if you can not slam on the brakes. No intercourse for 4-6 weeks, no douching or tampon use until seen by your doctor for your postpartum visit. Use condoms as needed for contraception with sexual activity. You may resume normal exercise after you are cleared by your physician at your postpartum check. You may walk for exercise, as tolerated. Discharge Instructions/Special Treatment/Home Care Needs:  
 
Continue your prenatal vitamins while breast feeding or pumping. Continue to use a squirt bottle with warm water on your perineum/bottom/episiotomy after each bathroom use until bleeding stops. Take stool softeners daily. For example, docusate over the counter stool softener. This is especially important if you are taking narcotic pain medications, because they can cause constipation. Call your doctor for the following: If you have a fever over 100.4 degrees by mouth on two readings. If you have persistent vaginal bleeding heavier than a heavy menstrual period or persistent large clots or if you are bleeding so heavy it is making you feel weak. It is normal to pass larger clots when you first get out of bed: but if they persist, notify your physician. If you have red streaks or increased swelling of legs, painful red streaks on your breast. 
If you have painful urination, or increased pain, redness or discharge with your incision. If you have any questions or concerns. Pain Management:  
 
Take Acetaminophen (Tylenol), Ibuprofen (Advil, Motrin), prescribed pain medications as directed for pain. Do not take Perocet with Tylenol, they both contain acetaminophen. Use a warm water Sitz bath 3 times daily to relieve episiotomy, bottom/perineum or hemorrhoidal discomfort. Apply heating pad to  incision as needed. For hemorrhoidal discomfort, you can use Tucks and Anusol cream as needed and directed. NSAID information for patients: 
[email or web address] Pain medication/narcotic information for patients:  Take your medicine exactly as prescribed  Store your medicine away from children and in a safe 
place  Do not give your medicine to others  Do not drink alcohol while taking this medicine Follow-Up Care:  
 
Appointment with MD: 
Dr. Nguyễn Husbands [phone] Schedule your postpartum visit for six weeks Contact your PCP or endocrinologist about adjusting your thyroid medication dose after delivery. Additional Discharge Instructions Please read all of your discharge instructions Follow all of your medication instructions carefully Call our office on the next business day to schedule your follow-up appointment If you have any questions or concerns, please contact us at [phone] or if the situation is urgent contact  Become a Arlene Pinzon My Chart user so you can access information, results and appointments: go to [email or web address].   
 
The [facility] [address] is to bring compassion to healthcare and to be good help to those in need. We aim at providing quality healthcare with an emphasis on respect, justice, compassion, stewardship, integrity, growth and innovation. If you did not receive excellent communication, compassionate care and an outstanding patient experience, please notify Alea Yan at [email or web address] or [phone] or discuss your concerns with me at your next visit so that we can meet our mission and your expectations Brandi Santiago MD 
[facility], [address] 
[email or web address]  
[phone] Good Help to Those in Need® POST DELIVERY DISCHARGE INSTRUCTIONS Name: Emani Moya YOB: 1985 Primary Diagnosis: Active Problems: 
  Labor and delivery, indication for care (5/14/2018) General:  
 
Diet/Diet Restrictions: 
Eight 8-ounce glasses of fluid daily (water, juices); avoid excessive caffeine intake. Meals/snacks as desired which are high in fiber and carbohydrates and low in fat and cholesterol. Physical Activity / Restrictions / Safety:  
 
Avoid heavy lifting, no more that 8 lbs. For 2-3 weeks; limit use of stairs to 2 times daily for the first week home. No driving for one week. Avoid intercourse 4-6 weeks, no douching or tampon use. Check with obstetrician before starting or resuming an exercise program.    
 
 
Discharge Instructions/Special Treatment/Home Care Needs:  
 
Continue prenatal vitamins. Continue to use squirt bottle with warm water on your episiotomy after each bathroom use until bleeding stops. If steri-strips applied to your incision, remove in 7-10 days. Call your doctor for the following:  
 
Fever over 101 degrees by mouth. Vaginal bleeding heavier than a normal menstrual period or clot larger than a golf ball.  
Red streaks or increased swelling of legs, painful red streaks on your breast. 
 Painful urination, constipation and increased pain or swelling or discharge with your incision. If you feel extremely anxious or overwhelmed. If you have thoughts of harming yourself and/or your baby. Pain Management:  
 
Pain Management:  
Take Acetaminophen (Tylenol) or Ibuprofen (Advil, Motrin), as directed for pain. Use a warm Sitz bath 3 times daily to relieve episiotomy or hemorrhoidal discomfort. Heating pad to  incision as needed. For hemorrhoidal discomfort, use Tucks and Anusol cream as needed and directed. Follow-Up Care: These are general instructions for a healthy lifestyle: No smoking/ No tobacco products/ Avoid exposure to second hand smoke Surgeon General's Warning:  Quitting smoking now greatly reduces serious risk to your health. Obesity, smoking, and sedentary lifestyle greatly increases your risk for illness A healthy diet, regular physical exercise & weight monitoring are important for maintaining a healthy lifestyle Recognize signs and symptoms of STROKE: 
 
F-face looks uneven A-arms unable to move or move unevenly S-speech slurred or non-existent T-time-call 911 as soon as signs and symptoms begin-DO NOT go Back to bed or wait to see if you get better-TIME IS BRAIN. POSTPARTUM DISCHARGE INSTRUCTIONS Name:  Shawn Liao YOB: 1985 Admission Diagnosis:  Pregnacy Labor and delivery, indication for care Discharge Diagnosis:   
Problem List as of 2018  Date Reviewed: 2018 Codes Class Noted - Resolved Labor and delivery, indication for care ICD-10-CM: O75.9 ICD-9-CM: 659.90  2018 - Present Prenatal care of multigravida, antepartum ICD-10-CM: Z34.80 ICD-9-CM: V22.1  10/28/2017 - Present Overview Addendum 2018  2:38 PM by Marilee Gabriel MD  
  H/o thyorid cancer, s/p thyroidectomy on synthroid Post plac NT: low risk/ AFP: negative Flu done out of office 11/29/17 normal ur cx XX #2 
2/7/18 hgb #10.3 [facility] P[facility]: [facility]: NP. 
+BF Bring thyroid meds to hospital 
 
  
  
   
 Pregnant ICD-10-CM: Z34.90 ICD-9-CM: V22.2  5/2/2016 - Present Chronic migraine without aura without status migrainosus, not intractable ICD-10-CM: Y31.851 ICD-9-CM: 346.70  12/2/2015 - Present Analgesic overuse headache ICD-10-CM: G44.40, T39.95XA ICD-9-CM: 339.3, E935.9  4/1/2015 - Present Intractable migraine without aura ICD-10-CM: H20.738 
ICD-9-CM: 346.11  4/1/2015 - Present Thyroid cancer [facility]) ICD-10-CM: G20 ICD-9-CM: 193  1/10/2013 - Present Overview Signed 1/10/2013 12:04 PM by Rebekah Aguirre MD  
  papillary RESOLVED: Supervision of other normal pregnancy ICD-10-CM: Z34.80 ICD-9-CM: V22.1  10/8/2015 - 10/28/2017 Overview Addendum 4/14/2016 10:36 AM by Joss Stauffer MD  
  [employer] NT: plan FS, NIPS:? Waiting for preauth, FS XX, fu prn Ant plac 
+tob, weaning, H/o thyroid cancer, hypothyroid on replacement, followed by Endo: Dr. Ad Wyman 
12/30/15 Urine Cx - negative, no UTI Growth US 34wk: H/o kidney stone: rec urology fu: number given Urine cx neg 03/23/2016 Peds: ? [place] +BF Attending Physician:  Joss Stauffer MD 
 
Delivery Type:  Vaginal Childbirth with Episiotomy, Laceration or Tear: What To Expect At [address] Your body will slowly heal in the next few weeks. It is easy to get too tired and overwhelmed during the first weeks after your baby is born. Changes in your hormones can shift your mood without warning. You may find it hard to meet the extra demands on your energy and time. Take it easy on yourself. Follow-up care is a key part of your treatment and safety. Be sure to make and go to all appointments, and call your doctor if you are having problems. It's also a good idea to know your test results and keep a list of the medicines you take. How can you care for yourself at home? Vaginal Bleeding and Cramps · After delivery, you will have a bloody discharge from the vagina. This will turn pink within a week and then white or yellow after about 10 days. It may last for 2 to 4 weeks or longer, until the uterus has healed. Use pads instead of tampons until you stop bleeding. · Do not worry if you pass some blood clots, as long as they are smaller than a golf ball. If you have a tear or stitches in your vaginal area, change the pad at least every 4 hours to prevent soreness and infection. · You may have cramps for the first few days after childbirth. These are normal and occur as the uterus shrinks to normal size. Take an over-the-counter pain medicine, such as acetaminophen (Tylenol), ibuprofen (Advil, Motrin), or naproxen (Aleve), for cramps. Read and follow all instructions on the label. Do not take aspirin, because it can cause more bleeding. Do not take acetaminophen (Tylenol) and other acetaminophen containing medications (i.e. Percocet) at the same time. Episiotomy, Lacerations or Tears · If you have stitches, they will dissolve on their own and do not need to be removed. · Put ice or a cold pack on your painful area for 10 to 20 minutes at a time, several times a day, for the first few days. Put a thin cloth between the ice and your skin. · Sit in a few inches of warm water (sitz bath) 3 times a day and after bowel movements. The warm water helps with pain and itching. If you do not have a tub, a warm shower might help. Breast fullness · Your breasts may overfill (engorge) in the first few days after delivery. To help milk flow and to relieve pain, warm your breasts in the shower or by using warm, moist towels before nursing. · If you are not nursing, do not put warmth on your breasts or touch your breasts. Wear a tight bra or sports bra and use ice until the fullness goes away. This usually takes 2 to 3 days. · Put ice or a cold pack on your breast after nursing to reduce swelling and pain. Put a thin cloth between the ice and your skin. Activity · Eat a balanced diet. Do not try to lose weight by cutting calories. Keep taking your prenatal vitamins, or take a multivitamin. · Get as much rest as you can. Try to take naps when your baby sleeps during the day. · Get some exercise every day. But do not do any heavy exercise until your doctor says it is okay. · Wait until you are healed (about 4 to 6 weeks) before you have sexual intercourse. Your doctor will tell you when it is okay to have sex. · Talk to your doctor about birth control. You can get pregnant even before your period returns. Also, you can get pregnant while you are breast-feeding. Mental Health · Many women get the \"baby blues\" during the first few days after childbirth. You may lose sleep, feel irritable, and cry easily. You may feel happy one minute and sad the next. Hormone changes are one cause of these emotional changes. Also, the demands of a new baby, along with visits from relatives or other family needs, add to a mother's stress. The \"baby blues\" often peak around the fourth day. Then they ease up in less than 2 weeks. · If your moodiness or anxiety lasts for more than 2 weeks, or if you feel like life is not worth living, you may have postpartum depression. This is different for each mother. Some mothers with serious depression may worry intensely about their infant's well-being. Others may feel distant from their child. Some mothers might even feel that they might harm their baby. A mother may have signs of paranoia, wondering if someone is watching her. · With all the changes in your life, you may not know if you are depressed. Pregnancy sometimes causes changes in how you feel that are similar to the symptoms of depression. · Symptoms of depression include: · Feeling sad or hopeless and losing interest in daily activities.  These are the most common symptoms of depression. · Sleeping too much or not enough. · Feeling tired. You may feel as if you have no energy. · Eating too much or too little. · POSTPARTUM SUPPORT INTERNATIONAL (PSI) offers a Warm line; Chat with the Expert phone sessions; Information and Articles about Pregnancy and Postpartum Mood Disorders; Comprehensive List of Free Support Groups; Knowledgeable local coordinators who will offer support, information, and resources; Guide to Resources on [employer]; Calendar of events in the  mood disorders community; Latest News and Research; and [facility] [address] for [employer]. Remember - You are not alone; You are not to blame; With help, you will be well. 0-718-493-PPD(9478). WWW. POSTPARTUM. NET · Writing or talking about death, such as writing suicide notes or talking about guns, knives, or pills. Keep the numbers for these national suicide hotlines: 2-461-363-TALK (2-786.213.4895) and 1-806-LOXCJOA (3-587.659.3089). If you or someone you know talks about suicide or feeling hopeless, get help right away. Constipation and Hemorrhoids Drink plenty of fluids, enough so that your urine is light yellow or clear like water. If you have kidney, heart, or liver disease and have to limit fluids, talk with your doctor before you increase the amount of fluids you drink. · Eat plenty of fiber each day. Have a bran muffin or bran cereal for breakfast, and try eating a piece of fruit for a mid-afternoon snack. · For painful, itchy hemorrhoids, put ice or a cold pack on the area several times a day for 10 minutes at a time. Follow this by putting a warm compress on the area for another 10 to 20 minutes or by sitting in a shallow, warm bath. When should you call for help? Call 911 anytime you think you may need emergency care. For example, call if: 
· You are thinking of hurting yourself, your baby, or anyone else. · You passed out (lost consciousness). · You have symptoms of a blood clot in your lung (called a pulmonary embolism). These may include: 
· Sudden chest pain. · Trouble breathing. · Coughing up blood. Call your doctor now or seek immediate medical care if: 
· You have severe vaginal bleeding. · You are soaking through a pad each hour for 2 or more hours. · Your vaginal bleeding seems to be getting heavier or is still bright red 4 days after delivery. · You are dizzy or lightheaded, or you feel like you may faint. · You are vomiting or cannot keep fluids down. · You have a fever. · You have new or more belly pain. · You pass tissue (not just blood). · Your vaginal discharge smells bad. · Your belly feels tender or full and hard. · Your breasts are continuously painful or red. · You feel sad, anxious, or hopeless for more than a few days. · You have sudden, severe pain in your belly. · You have symptoms of a blood clot in your leg (called a deep vein thrombosis), such as: 
· Pain in your calf, back of the knee, thigh, or groin. · Redness and swelling in your leg or groin. · You have symptoms of preeclampsia, such as: 
· Sudden swelling of your face, hands, or feet. · New vision problems (such as dimness or blurring). · A severe headache. · Your blood pressure is higher than it should be or rises suddenly. · You have new nausea or vomiting. Watch closely for changes in your health, and be sure to contact your doctor if you have any problems. Additional Information:  Preventing Infection at Home We care about preventing infection and avoiding the spread of germs - not only when you are in the hospital but also when you return home. When you return home from the hospital, it's important to take the following steps to help prevent infection and avoid spreading germs that could infect you and others. Ask everyone in your home to follow these guidelines, too. Clean Your Hands · Clean your hands whenever your hands are visibly dirty, before you eat, before or after touching your mouth, nose or eyes, and before preparing food. Clean them after contact with body fluids, using the restroom, touching animals or changing diapers. · When washing hands, wet them with warm water and work up a lather. Rub hands for at least 15 seconds, then rinse them and pat them dry with a clean towel or paper towel. · When using hand sanitizers, it should take about 15 seconds to rub your hands dry. If not, you probably didn't apply enough . Cover Your Sneeze or Cough Germs are released into the air whenever you sneeze or cough. To prevent the spread of infection: · Turn away from other people before coughing or sneezing. · Cover your mouth or nose with a tissue when you cough or sneeze. Put the tissue in the trash. · If you don't have a tissue, cough or sneeze into your upper sleeve, not your hands. · Always clean your hands after coughing or sneezing. Care for Wounds Your skin is your body's first line of defense against germs, but an open wound leaves an easy way for germs to enter your body. To prevent infection: · Clean your hands before and after changing wound dressings, and wear gloves to change dressings if recommended by your doctor. · Take special care with IV lines or other devices inserted into the body. If you must touch them, clean your hands first. 
· Follow any specific instructions from your doctor to care for your wounds. Contact your doctor if you experience any signs of infection, such as fever or increased redness at the surgical or wound site. Keep a [address] · Clean or wipe commonly touched hard surfaces like door handles, sinks, tabletops, phones and TV remotes. · Use products labeled \"disinfectant\" to kill harmful bacteria and viruses. · Use a clean cloth or paper towel to clean and dry surfaces.  Wiping surfaces with a dirty dishcloth, sponge or towel will only spread germs. · Never share toothbrushes, leal, drinking glasses, utensils, razor blades, face cloths or bath towels to avoid spreading germs. · Be sure that the linens that you sleep on are clean. · Keep pets away from wounds and wash your hands after touching pets, their toys or bedding. We care about you and your health. Remember, preventing infections is a  
team effort between you, your family, friends and health care providers. These are general instructions for a healthy lifestyle: No smoking/ No tobacco products/ Avoid exposure to second hand smoke Surgeon General's Warning:  Quitting smoking now greatly reduces serious risk to your health. Obesity, smoking, and sedentary lifestyle greatly increases your risk for illness A healthy diet, regular physical exercise & weight monitoring are important for maintaining a healthy lifestyle Recognize signs and symptoms of STROKE: 
 
F-face looks uneven A-arms unable to move or move unevenly S-speech slurred or non-existent T-time-call 911 as soon as signs and symptoms begin - DO NOT go  
    back to bed or wait to see if you get better - TIME IS BRAIN. I have had the opportunity to make my options or choices for discharge. I have received and understand these instructions. Signed By: Lulu Abreu RN                                                                                                   Date: 5/17/2018 Time: 7:15 AM 
 
 
 
  
  
  
[email or web address] Announcement We are excited to announce that we are making your provider's discharge notes available to you in [email or web address]. You will see these notes when they are completed and signed by the physician that discharged you from your recent hospital stay.   If you have any questions or concerns about any information you see in [employer], please call the [facility] Information Department where you were seen or reach out to your Primary Care Provider for more information about your plan of care. Introducing [facility]! Kim De León introduces [employer] patient portal. Now you can access parts of your medical record, email your doctor's office, and request medication refills online. 1. In your internet browser, go to [email or web address] 2. Click on the First Time User? Click Here link in the Sign In box. You will see the New Member Sign Up page. 3. Enter your [employer] Access Code exactly as it appears below. You will not need to use this code after youve completed the sign-up process. If you do not sign up before the expiration date, you must request a new code. · [employer] Access Code: [email or web address] Expires: 7/11/2018  8:04 AM 
 
4. Enter the last four digits of your Social Security Number (xxxx) and Date of Birth (mm/dd/yyyy) as indicated and click Submit. You will be taken to the next sign-up page. 5. Create a [employer] ID. This will be your [employer] login ID and cannot be changed, so think of one that is secure and easy to remember. 6. Create a [employer] password. You can change your password at any time. 7. Enter your Password Reset Question and Answer. This can be used at a later time if you forget your password. 8. Enter your e-mail address. You will receive e-mail notification when new information is available in [address] 9. Click Sign Up. You can now view and download portions of your medical record. 10. Click the Download Summary menu link to download a portable copy of your medical information. If you have questions, please visit the Frequently Asked Questions section of the [employer] website. Remember, [employer] is NOT to be used for urgent needs. For medical emergencies, dial 911. Now available from your iPhone and Android! Introducing Jostin Krishna As a [facility] patient, I wanted to make you aware of our electronic visit tool called Jostin Krishna. [facility] 24/7 allows you to connect within minutes with a medical provider 24 hours a day, seven days a week via a mobile device or tablet or logging into a secure website from your computer. You can access Jostin Prajapatifin from anywhere in the [place]. A virtual visit might be right for you when you have a simple condition and feel like you just dont want to get out of bed, or cant get away from work for an appointment, when your regular [facility] provider is not available (evenings, weekends or holidays), or when youre out of town and need minor care. Electronic visits cost only $49 and if the [facility] 24/7 provider determines a prescription is needed to treat your condition, one can be electronically transmitted to a nearby pharmacy*. Please take a moment to enroll today if you have not already done so. The enrollment process is free and takes just a few minutes. To enroll, please download the [facility] 24/7 mp to your tablet or phone, or visit [email or web address] to enroll on your computer. And, as an [address] [place] [address] patient with a Original account, the results of your visits will be scanned into your electronic medical record and your primary care provider will be able to view the scanned results. We urge you to continue to see your regular [facility] provider for your ongoing medical care. And while your primary care provider may not be the one available when you seek a Jostin Krishna virtual visit, the peace of mind you get from getting a real diagnosis real time can be priceless. For more information on Jostin Gonzalezayannafin, view our Frequently Asked Questions (FAQs) at [email or web address]. Sincerely, 
 
Gal Squires MD 
Chief Medical Officer Shaunna8 Halina Chung *:  certain medications cannot be prescribed via Jostin Krishna Providers Seen During Your Hospitalization Provider Specialty Primary office phone Marilee Gabriel MD Obstetrics & Gynecology [phone] Your Primary Care Physician (PCP) Primary Care Physician Office Phone Office Fax [place], [address] [phone] You are allergic to the following Allergen Reactions Amoxicillin Hives Recent Documentation Height Weight Breastfeeding? BMI OB Status Smoking Status 1.499 m 83.5 kg Yes 37.16 kg/m2 Recent pregnancy Current Every Day Smoker Emergency Contacts Name Discharge Info Relation Home Work Mobile Kaden Calvo DISCHARGE CAREGIVER [3] Spouse [3]   [phone] [address] Sparks  Parent [1] [phone] Patient Belongings The following personal items are in your possession at time of discharge: 
  Dental Appliances: None  Visual Aid: None      Home Medications: None   Jewelry: Body Piercing, Ring (tongue ring)  Clothing: At bedside    Other Valuables: Cell Phone, eyeOS Please provide this summary of care documentation to your next provider. Signatures-by signing, you are acknowledging that this After Visit Summary has been reviewed with you and you have received a copy. Patient Signature:  ____________________________________________________________ Date:  ____________________________________________________________  
  
Bee Russ Provider Signature:  ____________________________________________________________ Date:  ____________________________________________________________

## 2018-05-14 NOTE — IP AVS SNAPSHOT
[address] 
[phone] Patient: Justin Cooper MRN: [id number] ISAÍAS:[birth year] A check karishma indicates which time of day the medication should be taken. My Medications START taking these medications Instructions Each Dose to Equal  
 Morning Noon Evening Bedtime  
 ibuprofen 600 mg tablet Commonly known as:  MOTRIN Your last dose was: Your next dose is: Take 1 Tab by mouth every six (6) hours as needed for Pain. Take with food. 600 mg  
    
   
   
   
  
 oxyCODONE-acetaminophen 5-325 mg per tablet Commonly known as:  PERCOCET Your last dose was: Your next dose is:    
   
   
 1-2 Tablets every 4-6 hours as needed CONTINUE taking these medications Instructions Each Dose to Equal  
 Morning Noon Evening Bedtime PRENATAL VITAMIN PO Your last dose was: Your next dose is: Take  by mouth. ASK your doctor about these medications Instructions Each Dose to Equal  
 Morning Noon Evening Bedtime  
 butalbital-acetaminophen-caffeine -40 mg per tablet Commonly known as:  [place] Fee Your last dose was: Your next dose is: Take 1 Tab by mouth every six (6) hours as needed for Pain. 1 Tab  
    
   
   
   
  
 * SYNTHROID 125 mcg tablet Generic drug:  levothyroxine Your last dose was: Your next dose is: TAKE 1 TABLET BY MOUTH EVERY DAY  
     
   
   
   
  
 * SYNTHROID 175 mcg tablet Generic drug:  levothyroxine Your last dose was: Your next dose is:    
   
   
 TK 1 T PO D  
     
   
   
   
  
 * Notice: This list has 2 medication(s) that are the same as other medications prescribed for you.  Read the directions carefully, and ask your doctor or other care provider to review them with you. Where to Get Your Medications These medications were sent to [id number]- [place], [place] - [address], [address], [address] [place] [address] Phone:  [phone]  
  ibuprofen 600 mg tablet Information on where to get these meds will be given to you by the nurse or doctor. ! Ask your nurse or doctor about these medications  
  oxyCODONE-acetaminophen 5-325 mg per tablet

## 2018-05-14 NOTE — PROGRESS NOTES
Bedside and Verbal shift change report given to [address] [place] [address] (oncoming nurse) by Renetta Barber RN (offgoing nurse). Report included the following information SBAR, Kardex, Recent Results and Med Rec Status. Patient upright, stepping side to side, attached to the monitor, efm and toco.  Patient to the portable monitor and to sit on the labor ball. States she is comfortable on the ball. Ball stabilizer in place. Jean-Paul Doe called and ask to assess FMS, told Filiberto First that it appears that the fetus may be having late decels. And when the FHR is very low, it is not audible, AKIRA Lyman said she would place IUPC and ask for fetal scalp electrode also due to fhr variables being inaudible at times. 2207; sitting for epidural, time out completed. Monitors removed from portable monitor and placed back to monitor   2227; epidural complete. Patient tolerated well. Patient without any symptoms of low blood pressure. 2224; ephedrine 10mg administered IVP for decreased pressure 90/50 and fetal heart rate variables. 2224; patient from the portable monitor to the Monitor in the room. 2332;Pitocin restarted @ rate of 6, Jean-Paul Doe CNM said to increase quickly per protocol since the FMS looking reassuring. 2336; fhr deceleration, pitocin off  2340;left trendelenburg, O2, fluids @ 999ml/hr  2342; IV rate to 250cc/hr, patient remains on left side, trendelenburg position. FHR 130s reassuring  2352; bed taken out of trendelenburg, patient remains left lateral.    0024; pitocin restarted @ 2milliunits/min. Will monitor. 0036; variable with cristina to 65. Pitocin had been restarted @ 2 but has now been discontinued. Patient back to left trendelenburg position. 0220; fhr decel to 60s, not as quickly to recover this time as last.  After 5 minutes, the fhr recovered to baseline.   Jean-Paul Doe CNM called to review FMS.   0645; amnioinfusion started LR @ 500cc/hr, 250cc bolus to run over 30 min and then run at 125cc/hr after bolus completed. Bedside and Verbal shift change report given to KENNY Sarmiento RN (oncoming nurse) by Holmes Fabry RN (offgoing nurse). Report included the following information SBAR, Kardex, Recent Results and Med Rec Status.

## 2018-05-15 PROCEDURE — 0KQM0ZZ REPAIR PERINEUM MUSCLE, OPEN APPROACH: ICD-10-PCS | Performed by: OBSTETRICS & GYNECOLOGY

## 2018-05-15 PROCEDURE — 74011250636 HC RX REV CODE- 250/636: Performed by: ANESTHESIOLOGY

## 2018-05-15 PROCEDURE — 65270000029 HC RM PRIVATE

## 2018-05-15 PROCEDURE — 75410000002 HC LABOR FEE PER 1 HR: Performed by: OBSTETRICS & GYNECOLOGY

## 2018-05-15 PROCEDURE — 74011250637 HC RX REV CODE- 250/637: Performed by: OBSTETRICS & GYNECOLOGY

## 2018-05-15 PROCEDURE — 74011250636 HC RX REV CODE- 250/636: Performed by: OBSTETRICS & GYNECOLOGY

## 2018-05-15 RX ORDER — SODIUM CHLORIDE, SODIUM LACTATE, POTASSIUM CHLORIDE, CALCIUM CHLORIDE 600; 310; 30; 20 MG/100ML; MG/100ML; MG/100ML; MG/100ML
125 INJECTION, SOLUTION INTRAVENOUS CONTINUOUS
Status: DISCONTINUED | OUTPATIENT
Start: 2018-05-15 | End: 2018-05-15

## 2018-05-15 RX ORDER — OXYCODONE AND ACETAMINOPHEN 5; 325 MG/1; MG/1
1 TABLET ORAL
Status: DISCONTINUED | OUTPATIENT
Start: 2018-05-15 | End: 2018-05-17 | Stop reason: HOSPADM

## 2018-05-15 RX ORDER — IBUPROFEN 800 MG/1
800 TABLET ORAL EVERY 8 HOURS
Status: DISCONTINUED | OUTPATIENT
Start: 2018-05-15 | End: 2018-05-17 | Stop reason: HOSPADM

## 2018-05-15 RX ORDER — SIMETHICONE 80 MG
80 TABLET,CHEWABLE ORAL
Status: DISCONTINUED | OUTPATIENT
Start: 2018-05-15 | End: 2018-05-17 | Stop reason: HOSPADM

## 2018-05-15 RX ORDER — ONDANSETRON 4 MG/1
4 TABLET, ORALLY DISINTEGRATING ORAL
Status: DISCONTINUED | OUTPATIENT
Start: 2018-05-15 | End: 2018-05-17 | Stop reason: HOSPADM

## 2018-05-15 RX ORDER — DOCUSATE SODIUM 100 MG/1
100 CAPSULE, LIQUID FILLED ORAL
Status: DISCONTINUED | OUTPATIENT
Start: 2018-05-15 | End: 2018-05-17 | Stop reason: HOSPADM

## 2018-05-15 RX ORDER — DIPHENHYDRAMINE HCL 25 MG
25 CAPSULE ORAL
Status: DISCONTINUED | OUTPATIENT
Start: 2018-05-15 | End: 2018-05-17 | Stop reason: HOSPADM

## 2018-05-15 RX ORDER — NALOXONE HYDROCHLORIDE 0.4 MG/ML
0.4 INJECTION, SOLUTION INTRAMUSCULAR; INTRAVENOUS; SUBCUTANEOUS AS NEEDED
Status: DISCONTINUED | OUTPATIENT
Start: 2018-05-15 | End: 2018-05-17 | Stop reason: HOSPADM

## 2018-05-15 RX ORDER — HYDROCORTISONE ACETATE PRAMOXINE HCL 2.5; 1 G/100G; G/100G
CREAM TOPICAL AS NEEDED
Status: DISCONTINUED | OUTPATIENT
Start: 2018-05-15 | End: 2018-05-17 | Stop reason: HOSPADM

## 2018-05-15 RX ORDER — SODIUM CHLORIDE 9 MG/ML
125 INJECTION, SOLUTION INTRAVENOUS CONTINUOUS
Status: DISCONTINUED | OUTPATIENT
Start: 2018-05-15 | End: 2018-05-15

## 2018-05-15 RX ORDER — OXYTOCIN/RINGER'S LACTATE 20/1000 ML
125-500 PLASTIC BAG, INJECTION (ML) INTRAVENOUS ONCE
Status: ACTIVE | OUTPATIENT
Start: 2018-05-15 | End: 2018-05-16

## 2018-05-15 RX ORDER — ACETAMINOPHEN 325 MG/1
650 TABLET ORAL
Status: DISCONTINUED | OUTPATIENT
Start: 2018-05-15 | End: 2018-05-17 | Stop reason: HOSPADM

## 2018-05-15 RX ADMIN — IBUPROFEN 800 MG: 800 TABLET ORAL at 18:34

## 2018-05-15 RX ADMIN — FENTANYL 0.2 MG/100ML-BUPIV 0.125%-NACL 0.9% EPIDURAL INJ 10 ML/HR: 2/0.125 SOLUTION at 05:58

## 2018-05-15 RX ADMIN — ACETAMINOPHEN 650 MG: 325 TABLET ORAL at 10:12

## 2018-05-15 RX ADMIN — SODIUM CHLORIDE, SODIUM LACTATE, POTASSIUM CHLORIDE, AND CALCIUM CHLORIDE 125 ML/HR: 600; 310; 30; 20 INJECTION, SOLUTION INTRAVENOUS at 00:45

## 2018-05-15 RX ADMIN — SODIUM CHLORIDE, SODIUM LACTATE, POTASSIUM CHLORIDE, AND CALCIUM CHLORIDE 1000 ML: 600; 310; 30; 20 INJECTION, SOLUTION INTRAVENOUS at 00:44

## 2018-05-15 RX ADMIN — LEVOTHYROXINE SODIUM 150 MCG: 150 TABLET ORAL at 08:34

## 2018-05-15 RX ADMIN — Medication 3 MILLI-UNITS/MIN: at 10:30

## 2018-05-15 RX ADMIN — Medication 4 MILLI-UNITS/MIN: at 11:00

## 2018-05-15 RX ADMIN — SODIUM CHLORIDE, SODIUM LACTATE, POTASSIUM CHLORIDE, AND CALCIUM CHLORIDE: 600; 310; 30; 20 INJECTION, SOLUTION INTRAVENOUS at 02:29

## 2018-05-15 RX ADMIN — SODIUM CHLORIDE 125 ML/HR: 900 INJECTION, SOLUTION INTRAVENOUS at 14:53

## 2018-05-15 RX ADMIN — FENTANYL 0.2 MG/100ML-BUPIV 0.125%-NACL 0.9% EPIDURAL INJ 10 ML/HR: 2/0.125 SOLUTION at 11:34

## 2018-05-15 RX ADMIN — Medication 1 MILLI-UNITS/MIN: at 02:56

## 2018-05-15 NOTE — PROGRESS NOTES
0710-Bedside report received from 1623 Troy Patel RN. POC discussed with pt at this time, she verbalized understanding and has no questions or concerns. 0714-Pitocin discontinued, and O2 placed on pt at this time. 0814-DrAlicia Koyanagi at bedside to see pt, discuss POC, perform SVE, and replace IUPC.  0817-IUPC replaced at this time by Leena Dwyer,[address]. 0825-Pt repositioned on right side with birthing ball placed in between legs. 0905-Pt repositioned to left side with birthing ball placed in between legs. 1020-Pt repositioned to right side trendelenburg with birthing ball placed in between legs. 1027-Dr. Shepherd at bedside to recheck pt.  1031-Pt repositioned back to left side trendelenburg with birthing ball placed in between legs. Pt states she is comfortable and needs nothing else at this time. 1100-Writer at bedside to reposition pt, pt taken out of trendelenburg and left on left side with birthing ball in between legs. Writer instructed pt that at 1130 she needed to be turned back to right side, pt verbalized understanding and states she needs nothing else at this time. 1135-Pt ask to stay in current position for longer, states she is comfortable in current position. 1142-Dr. Shepherd given update on pt status, MD reported reactive strip with occasional variable or early deceleration, Last MVU of 270 but that before that was around 180's, that pitocin was just increased to 6milliunits/hr, pt is still comfortable. No new orders at this time. 1239-Pt repositioned to right side trendelenburg, with birthing ball in between legs. 1309-Dr. Shepherd at bedside to recheck pt.  1312-New order received to sit pt up in chair position, and labor down for about 20minutes then start pushing. No other orders at this time. 1403-Writer started pushing pt at this time per MD order. 1412-DrAlicia Koyanagi request to bedside.   1417-Dr. Shepherd at bedside to evaluate pt.  1425-After pushing with pt during to contractions with no descent, MD talking with pt and  about POC. MD explained to pt that at this time the infants head is not making descent with pushing even though the pt is pushing with effective effort. MD also explained to pt that earlier with pushing the infant was having decelerations in it heart rate that are a sign the infant is not tolerating labor. Dr. Keerthi Burton explained that at the moment the infants heart rate was back up and she is comfortable with the pt continuing to push as long as the infant is tolerating pushing. Writer to continue to push with pt at this time, MD to be called if infant starts to not tolerate pushing or if needed for delivery. 1640-Pt on phone, instructed to call writer when she was finished with call to get assistance with going to the bathroom. 1655-Writer at bedside, pt assisted to bathroom. Pt voided 600cc of urine, brooke care performed, pads changed, and pt assisted back to bed. 1735-Writer assisted pt to bathroom, pt voided 700cc of urine, brooke care performed, pads changed, and pt assisted back to bed. 1815-TRANSFER - OUT REPORT:    Verbal report given to CITLALI Michaud RN(name) on [facility]  being transferred to MIU(unit) for routine progression of care       Report consisted of patients Situation, Background, Assessment and   Recommendations(SBAR). Information from the following report(s) SBAR, Procedure Summary, MAR, Recent Results and Med Rec Status was reviewed with the receiving nurse. Lines:   Peripheral IV 05/14/18 Right; Lower Arm (Active)   Site Assessment Clean, dry, & intact 5/15/2018  4:00 PM   Phlebitis Assessment 0 5/15/2018  4:00 PM   Infiltration Assessment 0 5/15/2018  7:15 AM   Dressing Status Clean, dry, & intact 5/15/2018  4:00 PM   Dressing Type Tape;Transparent 5/14/2018  4:06 PM   Hub Color/Line Status Pink 5/14/2018  4:06 PM        Opportunity for questions and clarification was provided.       Patient transported with:   Registered Nurse

## 2018-05-15 NOTE — PROGRESS NOTES
[facility] Labor Progress Note  Patient seen, fetal and FHR evaluated. FHR prolonged deceleration noted at 0220 with to cristina of 70s for 5 total minutes with resolution with intrauterine resuscitation of O2, fluid bolus, position changes and T-Ruben. FHR has remained Cat 1 since episode with baseline of 125- 130 with accelerations and moderate variability. No data found. Physical Exam:  Cervical Exam:  Deferred due to SROM remote from delivery  Membranes:  Spontaneous Rupture of Membranes; Amniotic Fluid: clear fluid  Uterine Activity: Frequency: Every 2-3 minutes, Duration: 30-60  seconds and MVU of 130  Fetal Heart Rate: Reactive with baseline of 125-135 with accels, no decels since 0220. Assessment/Plan:  Reassuring fetal status   Restart pitocin at 1 mu/min. Reviewed with patient potential difficulty of obtaining adequate contraction pattern for vaginal delivery, fetus stable at present. Will continue with low dose pitocin and close observation.

## 2018-05-15 NOTE — PROGRESS NOTES
ITSP to replace IUPC    Pt comfortable, no pressure. Cx: c/8/0, posterior cervix, no edema. Head well applied  IUPC replaced    Anticipate  if progress continues  Sp SROM no s/sx IAI      Visit Vitals    /61    Pulse (!) 106    Temp 98.3 °F (36.8 °C)    Resp 17    Ht 4' 11\" (1.499 m)    Wt 184 lb (83.5 kg)    SpO2 100%    Breastfeeding Yes    BMI 37.16 kg/m2       NST Inpatient procedure note    Janette Torrez presents for fetal non-stress test.  LMP was on   Indication is labor. She is 41w5d. She has been monitored for > 60 minutes. The FHR was reactive. NST Interpretation:    FHR baseline 120-130 bpm, variability moderate. Accelerations present. Decelerations variable to 80s < 1min, not recurrent. Uterine contractions were present but irregular q 2-6min    Assessment    NST is reactive. NST is reassuring. Patient does need admission/observation for further monitoring. Naseemtereza Flavio was informed of the NST results and her questions were answered.     Plan:   Labor  Anticipate  if progress continues and rfs

## 2018-05-15 NOTE — PROGRESS NOTES
Visit Vitals    BP 99/62    Pulse 99    Temp 98.9 °F (37.2 °C)    Resp 17    Ht 4' 11\" (1.499 m)    Wt 184 lb (83.5 kg)    SpO2 97%    Breastfeeding Yes    BMI 37.16 kg/m2     CEFM:130w mod willy, accel, occ willy to 80s < 30 sec spont resolution, not recurrent  Early decels, not recurrent  TOCO: q 2-5min    Pt c/c/0  Little descent but pt reports more pressure.     Will continue to titrate pitocin while RFS    Begin pushing after chair/laboring down

## 2018-05-15 NOTE — PROGRESS NOTES
[facility] Labor Progress Note     Patient: Delma Adler MRN: [id number]  SSN: [id number]    YOB: 1985  Age: 34 y.o. Sex: female        Subjective:   Patient coping well with contractions. Remains comfortable with epidural.       Objective:   Blood pressure 107/64, pulse 91, temperature 98.4 °F (36.9 °C), resp. rate 16, height 4' 11\" (1.499 m), weight 184 lb (83.5 kg), last menstrual period 2017, SpO2 100 %. Fetal heart baseline 120,  moderate variability, positive accelerations, positive variable decelerations with contractions, Uterine contractions q2-3 minutes, mild to palpation, resting tone soft,MVUs 130-150 Sterile Vaginal Exam: 8 cm dilated/100% effaced/-1 station, cervix anterior, fetal presentation vertex, membranes ruptured with clear fluid. Pitocin remains at 1 mu/min. Assessment:     41w5d  Category 2 fetal heart rate tracing for variable decels. Active Labor with SROM x 20 hours, afebrile. Plan:   Continue current orders/management   CNM management   Reviewed strip with Dr. Jared Martinez, amnioinfusion initiated.   Anticipate JENNIFER Hernandez CNM

## 2018-05-15 NOTE — PROGRESS NOTES
[facility] Labor Progress Note     Patient: Paige Mckoy MRN: [id number]  SSN: [id number]    YOB: 1985  Age: 34 y.o. Sex: female        Subjective:   Patient coping well with contractions. Has been ambulating in hallway, now more uncomfortable on birthing ball and requesting epidural. Bolus initiated. Objective:   Blood pressure 117/65, pulse 96, temperature 98 °F (36.7 °C), resp. rate 16, height 4' 11\" (1.499 m), weight 184 lb (83.5 kg), last menstrual period 2017, SpO2 96 %/.   Fetal heart baseline 125  BPM,  Moderate variability, positive accelerations, negative decelerations, Uterine contractions q 2-3 minutes, moderate to palpation, resting tone soft, Sterile Vaginal Exam: deferred, fetal presentation vertex, membranes ruptured for clear fluid         Assessment:     41w4d  Category 1 fetal heart rate tracing   Latent labor with SROM at 1500. Pitocin at 8 mu/min.        Plan:   Continue current orders/management   CNM management   Anesthesia consult for epidural  Cervical exam when comfortable  Anticipate     Caty Hong CNM

## 2018-05-15 NOTE — ROUTINE PROCESS
Bedside and Verbal shift change report given to Panfilo Lima RN (oncoming nurse) by Nika URIBE (offgoing nurse). Report included the following information SBAR, Kardex, Intake/Output, MAR and Recent Results.

## 2018-05-15 NOTE — L&D DELIVERY NOTE
Delivery Note    Patient C/C/+2, with good descent and progress, pushed over intact perineum. LBFI  NC reduced at delivey  Delayed cord clamping  Spontaneous placenta delivery. Laceration repaired: 2nd degree in layers  Counts correct        Delivery Summary    Patient: Shawn Liao MRN: [id number]  SSN: [id number]    YOB: 1985  Age: 34 y.o. Sex: female        Labor Events:    Labor: No    Rupture Date: 2018    Rupture Time: 3:00 PM    Rupture Type SROM    Amniotic Fluid Volume:      Amniotic Fluid Description: Clear  None    Induction: None        Augmentation: Oxytocin    Labor Complications: None     Additional Complications:        Cervical Ripening:       None      Delivery Events:  Episiotomy: None    Laceration(s): Second degree perineal      Repaired: Yes     Number of Repair Packets: 1    Suture Type and Size:         Estimated Blood Loss (ml): 200        Information for the patient's newbornFranklyn Leary, Female [[id number]]     Delivery Summary - Baby    Delivery Date: 5/15/2018   Delivery Time: 3:26 PM   Delivery Type: Vaginal, Spontaneous Delivery  Sex:  female  Gestational Age: 44w9d  Delivery Clinician:  Katheryn Kerns  Living?: Living   Delivery Location: L&D             APGARS  One minute Five minutes Ten minutes   Skin Color: 1    1       Heart Rate: 2   2         Reflex Irritability: 2   2         Muscle Tone: 2   2       Respiration: 2   2         Total: 9   9           Presentation: Vertex  Position: Left Occiput Anterior  Resuscitation Method:  Suctioning-bulb; Tactile Stimulation     Meconium Stained: None    Cord Information: 3 Vessels   Complications: Nuchal Cord With Compressions  Cord Blood Sent?:  Yes    Blood Gases Sent?:  No    Placenta:  Date/Time: 5/15  3:35 PM  Removal: Spontaneous      Appearance: Normal;Intact      Measurements:  Birth Weight: 6 lb 14.8 oz (3.14 kg)    Birth Length: 1' 7\" (0.483 m)   Head Circumference: 1' 1.58\" (0.345 m) Chest Circumference: 1' 0.8\" (0.325 m)    Abdominal Girth: 1' 0.01\" (0.305 m)    Other Providers:   CASSIDY TRINIDAD;MEHDI FAJARDO;JONY CONWAY;RACHEL GUO;;ILENE DE LA FUENTE;CARMEN MAURICE;JESSICA CARTER;;ILENE DE LA FUENTE; Obstetrician;Primary Nurse;Primary  Nurse;Nursery Nurse;Neonatologist;Charge Nurse;Tech;Staff Nurse;Nursery Nurse;Staff Nurse;Tech           Cord Blood Results:  Information for the patient's :  Lord Christopher, Female [[id number]]   No results found for: ABORH, PCTABR, PCTDIG, BILI, ABORHEXT, ABORH    Information for the patient's :  Lord Christopher, Female [[id number]]   No results found for: APH, APCO2, APO2, AHCO3, ABEC, ABDC, O2ST, SITE, [facility] [place], PHI, Kimberly, PO2I, HCO3I, SO2I, IBD     Information for the patient's newbornLeonid Lopez, Female [[id number]]   No results found for: EPHV, PCO2V, PO2V, HCO3V, O2STV, EBDV

## 2018-05-15 NOTE — ANESTHESIA PROCEDURE NOTES
Epidural Block    Start time: 5/14/2018 10:07 PM  End time: 5/14/2018 10:19 PM  Performed by: Jessica Singh by: Sandralee Alpers     Pre-Procedure  Indication: labor epidural    Preanesthetic Checklist: patient identified, risks and benefits discussed, anesthesia consent, timeout performed and anesthesia consent    Timeout Time: 22:07        Epidural:   Patient position:  Seated  Prep region:  Lumbar  Prep: Betadine    Location:  L3-4    Needle and Epidural Catheter:   Needle Type:  Tuohy  Needle Gauge:  17 G  Injection Technique:  Loss of resistance using air  Attempts:  1  Catheter Size:  18 G  Catheter at Skin Depth (cm):  8  Depth in Epidural Space (cm):  4  Events: no paresthesia and negative aspiration test    Test Dose:  Lidocaine 1.5% w/ epi and negative    Assessment:   Catheter Secured:  Tegaderm and tape  Insertion:  Uncomplicated  Patient tolerance:  Patient tolerated the procedure well with no immediate complications

## 2018-05-15 NOTE — LACTATION NOTE
Tay Hernandez Lactation Consultant Signed  Progress Notes Date of Service: 05/15/18 5487         Problem: Lactation Care Plan  Goal: *Infant latching appropriately  Outcome: Progressing Towards Goal  Pt will successfully establish breastfeeding by feeding in response to infant's early feeding cues and/or to offer breast every 2-3 hours. Ways to obtain a deep latch and seek comfortable positioning shared, aware to keep log of feedings/output. Goal: *Weight loss less than 10% of birth weight  Outcome: Progressing Towards Goal     Encouraged mom to attempt feeding with baby led feeding cues. Just as sucking on fingers, rooting, mouthing. Looking for 8-12 feedings in 24 hours. Don't limit baby at breast, allow baby to come of breast on it's own. Baby may want to feed  often and may increase number of feedings on second day of life. Skin to skin encouraged.       If baby doesn't nurse,  Mom should  hand express  10-20 drops of colostrum and drip into baby's mouth, or give to baby by finger feeding, cup feeding, or spoon feeding at least every 2-3 hours.      Problem: Patient Education: Go to Patient Education Activity  Goal: Patient/Family Education  Outcome: Progressing Towards Goal  Discussed with mother her plan for feeding. Reviewed the benefits of exclusive breast milk feeding during the hospital stay. Informed her of the risks of using formula to supplement in the first few days of life as well as the benefits of successful breast milk feeding; referred her to the Breastfeeding booklet about this information. She acknowledges understanding of information reviewed and states that it is her plan to breastfeed her infant. Will support her choice and offer additional information as needed.      Hand Expression Education:  Mom taught how to manually hand express her colostrum.   Emphasized the importance of providing infant with valuable colostrum as infant rests skin to skin at breast.  Aware to avoid extended periods of non-feeding. Aware to offer 10-20+ drops of colostrum every 2-3 hours until infant is latching and nursing effectively. Taught the rationale behind this low tech but highly effective evidence based practice.     Comments: Pt will successfully establish breastfeeding by feeding in response to early feeding cues   or wake every 3h, will obtain deep latch, and will keep log of feedings/output. Taught to BF at hunger cues and or q 2-3 hrs and to offer 10-20 drops of hand expressed colostrum at any non-feeds.       Breast Assessment  Left Breast: Large  Left Nipple: Everted, Intact  Right Breast: Large  Right Nipple: Everted, Intact  Breast- Feeding Assessment  Attends Breast-Feeding Classes: No  Breast-Feeding Experience: Yes (Breast fed 1st x 6 months. Had low supply. Tried fenugreek. Gave list of foods and drinks for low milk supply. )  Breast Trauma/Surgery: No  Type/Quality: Attempted  Lactation Consultant Visits  Breast-Feedings: Attempted breast-feeding (Mother attempted. Baby not interested.  Baby placed skin to skin)  Mother/Infant Observation  Mother Observation: Alignment, Breast comfortable, Close hold  Infant Observation: Opens mouth  LATCH Documentation  Latch:  (Breastfeeding attempted)

## 2018-05-15 NOTE — PROGRESS NOTES
ITSP for decels while pushing. Pushed with pt x 2 UC with baseline 140s, min to mod variability. Good maternal effort but little descent. D/W pt continue YOSI while RFS vs CS. Will continue pushing while RFS and reassess closely. Visit Vitals    BP 99/62    Pulse 99    Temp 98.9 °F (37.2 °C)    Resp 17    Ht 4' 11\" (1.499 m)    Wt 184 lb (83.5 kg)    SpO2 97%    Breastfeeding Yes    BMI 37.16 kg/m2     Disc do not rec forceps/vacuum at 0 station.       Collette Bair, MD

## 2018-05-15 NOTE — PROGRESS NOTES
RN contacted provided with concerns of noting 2 late decelerations when palpating contractions and listening to FHR. Not noted on EFM. Contractions not maintained on EFM and internal monitors requested. Pitocin discontinued by RN. IUPC and FECG placed by provider after explanation to patient. Patient positioned with peanut ball.

## 2018-05-15 NOTE — ROUTINE PROCESS
TRANSFER - IN REPORT:    Verbal report received from Sinan Elmore RN(name) on [facility]  being received from L&D(unit) for routine progression of care      Report consisted of patients Situation, Background, Assessment and   Recommendations(SBAR). Information from the following report(s) SBAR, Kardex, Intake/Output, MAR and Recent Results was reviewed with the receiving nurse. Opportunity for questions and clarification was provided. Assessment completed upon patients arrival to unit and care assumed.

## 2018-05-15 NOTE — PROGRESS NOTES
[facility] Labor Progress Note     Patient: Craig Newell MRN: [id number]  SSN: [id number]    YOB: 1985  Age: 34 y.o. Sex: female        Subjective:   Patient coping well with contractions. Objective:   Blood pressure 117/65, pulse 96, temperature 98 °F (36.7 °C), resp. rate 16, height 4' 11\" (1.499 m), weight 184 lb (83.5 kg), last menstrual period 2017, SpO2 96 %. Fetal heart baseline 150 BPM, moderate variability, positive accelerations, one variable deceleration when BP low, Uterine contractions q3 minutes, mild to moderate to palpation, resting tone soft, Sterile Vaginal Exam:3-4 cm dilated/100 % effaced/ -3 station, cervix midline, fetal presentation vertex, membranes ruptured,forebag AROM for clear fluid. Pitocin at 6 mu/min.         Assessment:     41w4d  Category 1 fetal heart rate tracing   Latent labor with SROM on pitocin      Plan:   Continue current orders/management   CNM management   Anticipate     Cleopatra Feldman CNM

## 2018-05-16 PROCEDURE — 77030018846 HC SOL IRR STRL H20 ICUM -A

## 2018-05-16 PROCEDURE — 65270000029 HC RM PRIVATE

## 2018-05-16 PROCEDURE — 74011250637 HC RX REV CODE- 250/637: Performed by: OBSTETRICS & GYNECOLOGY

## 2018-05-16 RX ORDER — IBUPROFEN 600 MG/1
600 TABLET ORAL
Qty: 30 TAB | Refills: 0 | Status: SHIPPED | OUTPATIENT
Start: 2018-05-16 | End: 2020-11-06

## 2018-05-16 RX ORDER — OXYCODONE AND ACETAMINOPHEN 5; 325 MG/1; MG/1
TABLET ORAL
Qty: 10 TAB | Refills: 0 | Status: SHIPPED | OUTPATIENT
Start: 2018-05-16 | End: 2018-07-24

## 2018-05-16 RX ADMIN — OXYCODONE HYDROCHLORIDE AND ACETAMINOPHEN 1 TABLET: 5; 325 TABLET ORAL at 04:29

## 2018-05-16 RX ADMIN — IBUPROFEN 800 MG: 800 TABLET ORAL at 17:21

## 2018-05-16 RX ADMIN — DOCUSATE SODIUM 100 MG: 100 CAPSULE, LIQUID FILLED ORAL at 22:03

## 2018-05-16 RX ADMIN — IBUPROFEN 800 MG: 800 TABLET ORAL at 08:48

## 2018-05-16 RX ADMIN — LEVOTHYROXINE SODIUM 150 MCG: 150 TABLET ORAL at 07:11

## 2018-05-16 RX ADMIN — OXYCODONE HYDROCHLORIDE AND ACETAMINOPHEN 1 TABLET: 5; 325 TABLET ORAL at 13:32

## 2018-05-16 RX ADMIN — IBUPROFEN 800 MG: 800 TABLET ORAL at 01:30

## 2018-05-16 RX ADMIN — OXYCODONE HYDROCHLORIDE AND ACETAMINOPHEN 1 TABLET: 5; 325 TABLET ORAL at 22:03

## 2018-05-16 NOTE — LACTATION NOTE
This note was copied from a baby's chart. Pt will successfully establish breastfeeding by feeding in response to early feeding cues   or wake every 3h, will obtain deep latch, and will keep log of feedings/output. Taught to BF at hunger cues and or q 2-3 hrs and to offer 10-20 drops of hand expressed colostrum at any non-feeds. Breast Assessment  Left Breast: Large  Left Nipple: Everted, Intact, Tender (Cradle hold at last BF session, mom states infant had a shallow latch, rationale for positioning ithat supports infant's innate breast crawl  BF instincts)  Right Breast: Large (Breasts filling)  Right Nipple: Everted, Intact  Breast- Feeding Assessment  Attends Breast-Feeding Classes: No (Family's feeding goals discussed, BF basics shared)  Breast-Feeding Experience: Yes (BF 1st child x 6+ mo)  Breast Trauma/Surgery: No  Type/Quality: Good (Mom reports infant has been effectively nursing in side lying position)  Lactation Consultant Visits  Breast-Feedings: Good  (How milk is made reiterated, how to ID markers of a successful BF session shared)  Mother/Infant Observation  Mother Observation: Alignment, Breast comfortable, Recognizes feeding cues, Lets baby end feeding  Infant Observation: Feeding cues, Rhythmic suck  LATCH Documentation  Latch: Grasps breast, tongue down, lips flanged, rhythmic sucking (As per mom.  LC did not observe a feeding session this afternoon)  Audible Swallowing: A few with stimulation  Type of Nipple: Everted (after stimulation)  Comfort (Breast/Nipple): Soft/non-tender  Hold (Positioning): Full assist, teach one side, mother does other, staff holds  [facility] Score: 8    Care for sore/tender nipples discussed:  ways to improve positioning and latch practiced and discussed, hand express colostrum after feedings and let air dry, light application of lanolin, hydrogel pads, seek comfortable laid back feeding position, start feedings on least sore side first.    Biological Nurturing breastfeeding principles taught. How Biological Nurturing (BN)  promotes optimal breastfeeding (BF) sessions discussed. Mother encouraged to seek comfortable semi-reclining breastfeeding positions. Infant placed frontally along maternal contour. Primitive innate feeding reflexes/behaviors of the  discussed. BN tips and techniques shared; assisted with comfortable breastfeeding positioning.

## 2018-05-16 NOTE — ROUTINE PROCESS
Bedside shift change report given to KELLY Reyes RN (oncoming nurse) by Sadie Pearce RN (offgoing nurse). Report included the following information SBAR, Procedure Summary, Intake/Output, MAR and Recent Results.

## 2018-05-16 NOTE — PROGRESS NOTES
Post-Partum Progress Note    Patient doing well post-partum without significant complaint. She is voiding without difficulty, she reports normal lochia. Her pain is well controlled with oral pain medication, but she is still having some cramping. She is tolerating a general diet. Delivery information:  Information for the patient's :  Tennis Adjutant, Female [[id number]]   Delivery of a 6 lb 14.8 oz (3.14 kg) female infant via Vaginal, Spontaneous Delivery on 5/15/2018 at 3:26 PM  by . Apgars were 9 and 9. Vitals:  Patient Vitals for the past 8 hrs:   BP Temp Pulse Resp   18 0548 90/54 98 °F (36.7 °C) 80 14   18 0023 101/57 97.9 °F (36.6 °C) 83 16     Temp (24hrs), Av.5 °F (36.9 °C), Min:97.9 °F (36.6 °C), Max:98.9 °F (37.2 °C)    Visit Vitals    BP 90/54 (BP 1 Location: Right arm, BP Patient Position: At rest)    Pulse 80    Temp 98 °F (36.7 °C)    Resp 14    Ht 4' 11\" (1.499 m)    Wt 184 lb (83.5 kg)    SpO2 96%    Breastfeeding Yes    BMI 37.16 kg/m2       Exam:    General: Patient without distress. Abdomen: soft, fundus firm at level of umbilicus, nontender  Lower extremities: negative for cords or tenderness. Labs: No results found for this or any previous visit (from the past 24 hour(s)). Assessment:    1. Postpartum S/P spontaneous vaginal delivery   2. Patient doing well without significant complications    Plan:  1. Continue routine postpartum care  2. Routine perineal care and maternal education   3.  Oral pain medications and bowel regimen as needed                Gopi Mark MD

## 2018-05-16 NOTE — ROUTINE PROCESS
Bedside and Verbal shift change report given to [address] (oncoming nurse) by COSME Savage RN (offgoing nurse). Report included the following information SBAR, Kardex, Procedure Summary, Intake/Output, MAR and Recent Results.

## 2018-05-16 NOTE — DISCHARGE SUMMARY
Obstetrical Discharge Summary     Name: Caroline Rich MRN: [id number]  SSN: [id number]    YOB: 1985  Age: 34 y.o. Sex: female      Admit Date: 2018    Discharge Date: 18    Admitting Physician: Marcy Ayala MD     Attending Physician:  Marcy Ayala MD     Admission Diagnoses: Pregnacy ; Labor and delivery, indication for care    Condition on Discharge: Stable    Procedures:     Disposition: to home    Discharge Diagnoses:   Information for the patient's :  Robles Ornelas, Female [[id number]]   Delivery of a 6 lb 14.8 oz (3.14 kg) female infant via Vaginal, Spontaneous Delivery on 5/15/2018 at 3:26 PM  by . Apgars were 9 and 9. Additional Diagnoses:   Hospital Problems  Date Reviewed: 2018          Codes Class Noted POA    Labor and delivery, indication for care ICD-10-CM: O75.9  ICD-9-CM: 659.90  2018 Unknown             Lab Results   Component Value Date/Time    Rubella, External immune 10/07/2017    GrBStrep, External Negative 2018       Hospital Course: Normal hospital course following the delivery. Patient Instructions:   Current Discharge Medication List      START taking these medications    Details   ibuprofen (MOTRIN) 600 mg tablet Take 1 Tab by mouth every six (6) hours as needed for Pain. Take with food. Qty: 30 Tab, Refills: 0      oxyCODONE-acetaminophen (PERCOCET) 5-325 mg per tablet 1-2 Tablets every 4-6 hours as needed  Qty: 10 Tab, Refills: 0    Associated Diagnoses:  (spontaneous vaginal delivery)         CONTINUE these medications which have NOT CHANGED    Details   !! SYNTHROID 175 mcg tablet TK 1 T PO D  Refills: 5    Associated Diagnoses: Prenatal care of multigravida, antepartum      PRENATAL VIT W-CA,FE,FA,<1 MG, (PRENATAL VITAMIN PO) Take  by mouth. butalbital-acetaminophen-caffeine (FIORICET, ESGIC) -40 mg per tablet Take 1 Tab by mouth every six (6) hours as needed for Pain.   Qty: 12 Tab, Refills: 0      !! SYNTHROID 125 mcg tablet TAKE 1 TABLET BY MOUTH EVERY DAY  Refills: 5       !! - Potential duplicate medications found. Please discuss with provider. Reference my discharge instructions.     Follow-up Appointments   Procedures    FOLLOW UP VISIT Appointment in: 6 Weeks     Standing Status:   Standing     Number of Occurrences:   1     Order Specific Question:   Appointment in     Answer:   6 Weeks        Signed By:  Collette Bair, MD     May 16, 2018

## 2018-05-17 VITALS
HEART RATE: 68 BPM | WEIGHT: 184 LBS | OXYGEN SATURATION: 96 % | BODY MASS INDEX: 37.09 KG/M2 | RESPIRATION RATE: 16 BRPM | HEIGHT: 59 IN | TEMPERATURE: 97.6 F | DIASTOLIC BLOOD PRESSURE: 71 MMHG | SYSTOLIC BLOOD PRESSURE: 116 MMHG

## 2018-05-17 PROCEDURE — 74011250637 HC RX REV CODE- 250/637: Performed by: OBSTETRICS & GYNECOLOGY

## 2018-05-17 RX ADMIN — IBUPROFEN 800 MG: 800 TABLET ORAL at 09:37

## 2018-05-17 RX ADMIN — LEVOTHYROXINE SODIUM 150 MCG: 150 TABLET ORAL at 06:51

## 2018-05-17 NOTE — ROUTINE PROCESS
Discharge instructions reviewed with patient to include signs and symptoms to notify MD, take home prescriptions and follow up appointments. Patient verbalized understanding of all teaching and voiced no concerns at this time. Patient discharged home .

## 2018-05-17 NOTE — PROGRESS NOTES
Bedside and Verbal shift change report given to Ramo Cee RN (oncoming nurse) by Abi Vincent RN (offgoing nurse). Report included the following information SBAR, Kardex and MAR.

## 2018-05-17 NOTE — DISCHARGE INSTRUCTIONS
[facility]  [email or web address]  [phone]    Justino Bone MD, FACOG     POST DELIVERY DISCHARGE INSTRUCTIONS  FROM YOUR PHYSICIAN    Name: Janette Torrez  YOB: 1985    General:     Read all discharge information provided by the hospital    Diet/Diet Restrictions:  Eat healthy meals and snacks as desired. Eat foods that are high in fiber and low in fat and cholesterol. Drink eight 8-ounce glasses of water daily; avoid excessive caffeine intake. [email or web address] html  [email or web address]    Medications:   See discharge medication list and read instructions carefully. Breast Feeding:  See instructions from your lactation consult. Call [phone] for more information or to locate a lactation consultant. [email or web address].[email or web address]    Vaccines:  If you received the MMR vaccine postpartum you should wait three months until you get pregnant again. You, and close contacts, should make sure that the Tdap vaccine is up to date. This vaccine can decrease the risk of your baby getting pertussis or \"whooping cough. \"    You, and close contacts, should receive the influenza vaccine during flu season when appropriate. [email or web address].[email or web address]    Tobacco Use: If you (or other people around the baby) smoke or use tobacco products, please try to  use and quit to improve your health and decrease risk to your baby. LimitBuy.[email or web address]. htm    Swelling in your Legs:  There are many fluid changes after delivery and you may have more swelling the first few days after delivery  Continue to drink plenty of water, avoid sitting or standing in one position for too long and elevate your feet above your heart, to help reduce some the pressure you may be feeling in your ankles and legs.      Section Incision:  Steri-strips or tape strips may be removed gently at home approximately 7- 10 days after surgery. Soaking the strips with a warm, wet cloth or taking a shower may make the strips easier to remove. Metal staples are usually removed within 3 to 10 days, either before you leave the hospital or in the office. Make an appointment if needed. Insorb absorbable staples may be used under the skin but you may see small white pieces as they dissolve. Skin glue or dermabond will fall off with time. Abdominal incisions should be kept clean by showering. It is not necessary to put soap on the incision; plain tap water is adequate. Avoid scrubbing the area and pat dry. The way your scar looks will change over time and may not reach its final appearance for up to a year. The area may feel either numb or sensitive to touch, which is normal.         Physical Activity / Restrictions / Safety:     Avoid heavy lifting, no more that 10 pounds, for 2-3 weeks. No driving while taking narcotic pain medication, of if you can not slam on the brakes. No intercourse for 4-6 weeks, no douching or tampon use until seen by your doctor for your postpartum visit. Use condoms as needed for contraception with sexual activity. You may resume normal exercise after you are cleared by your physician at your postpartum check. You may walk for exercise, as tolerated. Discharge Instructions/Special Treatment/Home Care Needs:     Continue your prenatal vitamins while breast feeding or pumping. Continue to use a squirt bottle with warm water on your perineum/bottom/episiotomy after each bathroom use until bleeding stops. Take stool softeners daily. For example, docusate over the counter stool softener. This is especially important if you are taking narcotic pain medications, because they can cause constipation. Call your doctor for the following:      If you have a fever over 100.4 degrees by mouth on two readings. If you have persistent vaginal bleeding heavier than a heavy menstrual period or persistent large clots or if you are bleeding so heavy it is making you feel weak. It is normal to pass larger clots when you first get out of bed: but if they persist, notify your physician. If you have red streaks or increased swelling of legs, painful red streaks on your breast.  If you have painful urination, or increased pain, redness or discharge with your incision. If you have any questions or concerns. Pain Management:     Take Acetaminophen (Tylenol), Ibuprofen (Advil, Motrin), prescribed pain medications as directed for pain. Do not take Perocet with Tylenol, they both contain acetaminophen. Use a warm water Sitz bath 3 times daily to relieve episiotomy, bottom/perineum or hemorrhoidal discomfort. Apply heating pad to  incision as needed. For hemorrhoidal discomfort, you can use Tucks and Anusol cream as needed and directed. NSAID information for patients:  [email or web address]    Pain medication/narcotic information for patients:   Take your medicine exactly as prescribed   Store your medicine away from children and in a safe  place   Do not give your medicine to others   Do not drink alcohol while taking this medicine    Follow-Up Care:     Appointment with MD:  Dr. Yamilka Doe  [phone]  Schedule your postpartum visit for six weeks    Contact your PCP or endocrinologist about adjusting your thyroid medication dose after delivery.        Additional Discharge Instructions    Please read all of your discharge instructions  Follow all of your medication instructions carefully  Call our office on the next business day to schedule your follow-up appointment  If you have any questions or concerns, please contact us at [phone] or if the situation is urgent contact [phone]  Become a [employer] My Chart user so you can access information, results and appointments: go to [email or web address] [email or web address]. The [address] is to bring compassion to healthcare and to be good help to those in need. We aim at providing quality healthcare with an emphasis on respect, justice, compassion, stewardship, integrity, growth and innovation. If you did not receive excellent communication, compassionate care and an outstanding patient experience, please notify Pauline Deven at [email or web address] or [phone] or discuss your concerns with me at your next visit so that we can meet our mission and your expectations    Charles Brock MD  [address] [place] [address], [address]  [email or web address]   [phone]   Good Help to Those in [address]    Name: Jodeane Apley  YOB: 1985  Primary Diagnosis: Active Problems:    Labor and delivery, indication for care (5/14/2018)        General:     Diet/Diet Restrictions:  Eight 8-ounce glasses of fluid daily (water, juices); avoid excessive caffeine intake. Meals/snacks as desired which are high in fiber and carbohydrates and low in fat and cholesterol. Physical Activity / Restrictions / Safety:     Avoid heavy lifting, no more that 8 lbs. For 2-3 weeks; limit use of stairs to 2 times daily for the first week home. No driving for one week. Avoid intercourse 4-6 weeks, no douching or tampon use. Check with obstetrician before starting or resuming an exercise program.         Discharge Instructions/Special Treatment/Home Care Needs:     Continue prenatal vitamins. Continue to use squirt bottle with warm water on your episiotomy after each bathroom use until bleeding stops. If steri-strips applied to your incision, remove in 7-10 days.     Call your doctor for the following:     Fever over 101 degrees by mouth. Vaginal bleeding heavier than a normal menstrual period or clot larger than a golf ball. Red streaks or increased swelling of legs, painful red streaks on your breast.  Painful urination, constipation and increased pain or swelling or discharge with your incision. If you feel extremely anxious or overwhelmed. If you have thoughts of harming yourself and/or your baby. Pain Management:     Pain Management:   Take Acetaminophen (Tylenol) or Ibuprofen (Advil, Motrin), as directed for pain. Use a warm Sitz bath 3 times daily to relieve episiotomy or hemorrhoidal discomfort. Heating pad to  incision as needed. For hemorrhoidal discomfort, use Tucks and Anusol cream as needed and directed. Follow-Up Care: These are general instructions for a healthy lifestyle:    No smoking/ No tobacco products/ Avoid exposure to second hand smoke    Surgeon General's Warning:  Quitting smoking now greatly reduces serious risk to your health. Obesity, smoking, and sedentary lifestyle greatly increases your risk for illness    A healthy diet, regular physical exercise & weight monitoring are important for maintaining a healthy lifestyle    Recognize signs and symptoms of STROKE:    F-face looks uneven    A-arms unable to move or move unevenly    S-speech slurred or non-existent    T-time-call 911 as soon as signs and symptoms begin-DO NOT go       Back to bed or wait to see if you get better-TIME IS BRAIN.     POSTPARTUM DISCHARGE INSTRUCTIONS       Name:  Bess Urrutia  YOB: 1985  Admission Diagnosis:  Pregnacy   Labor and delivery, indication for care     Discharge Diagnosis:    Problem List as of 2018  Date Reviewed: 2018          Codes Class Noted - Resolved    Labor and delivery, indication for care ICD-10-CM: O75.9  ICD-9-CM: 659.90  2018 - Present        Prenatal care of multigravida, antepartum ICD-10-CM: Z34.80  ICD-9-CM: V22.1  10/28/2017 - Present    Overview Addendum 2/20/2018  2:38 PM by Velvet Dietrich MD     H/o thyorid cancer, s/p thyroidectomy on synthroid  Post plac  NT: low risk/ AFP: negative  Flu done out of office  11/29/17 normal ur cx  XX #2  2/7/18 hgb #10.3  [facility]  Peds: [facility]: NP.  +BF  Bring thyroid meds to hospital               Pregnant ICD-10-CM: Z34.90  ICD-9-CM: V22.2  5/2/2016 - Present        Chronic migraine without aura without status migrainosus, not intractable ICD-10-CM: T62.578  ICD-9-CM: 346.70  12/2/2015 - Present        Analgesic overuse headache ICD-10-CM: G44.40, T39.95XA  ICD-9-CM: 339.3, E935.9  4/1/2015 - Present        Intractable migraine without aura ICD-10-CM: Z27.752  ICD-9-CM: 346.11  4/1/2015 - Present        Thyroid cancer ([facility] [address] ICD-10-CM: C73  ICD-9-CM: 363  1/10/2013 - Present    Overview Signed 1/10/2013 12:04 PM by Erica Brock MD     papillary             RESOLVED: Supervision of other normal pregnancy ICD-10-CM: Z34.80  ICD-9-CM: V22.1  10/8/2015 - 10/28/2017    Overview Addendum 4/14/2016 10:36 AM by Velvet Dietrich MD     [address] [place] [address] NT: plan FS, NIPS:? Waiting for preauth, FS XX, fu prn  Ant plac  +tob, weaning,  H/o thyroid cancer, hypothyroid on replacement, followed by Endo: Dr. Jordan Mensah  12/30/15 Urine Cx - negative, no UTI  Growth US 34wk:   H/o kidney stone: rec urology fu: number given  Urine cx neg 03/23/2016  Peds: ? [facility]  +BF                 Attending Physician:  Velvet Dietrich MD    Delivery Type:  Vaginal Childbirth with Episiotomy, Laceration or Tear: What To Expect At [address] body will slowly heal in the next few weeks. It is easy to get too tired and overwhelmed during the first weeks after your baby is born. Changes in your hormones can shift your mood without warning. You may find it hard to meet the extra demands on your energy and time. Take it easy on yourself.     Follow-up care is a key part of your treatment and safety. Be sure to make and go to all appointments, and call your doctor if you are having problems. It's also a good idea to know your test results and keep a list of the medicines you take. How can you care for yourself at home? Vaginal Bleeding and Cramps  · After delivery, you will have a bloody discharge from the vagina. This will turn pink within a week and then white or yellow after about 10 days. It may last for 2 to 4 weeks or longer, until the uterus has healed. Use pads instead of tampons until you stop bleeding. · Do not worry if you pass some blood clots, as long as they are smaller than a golf ball. If you have a tear or stitches in your vaginal area, change the pad at least every 4 hours to prevent soreness and infection. · You may have cramps for the first few days after childbirth. These are normal and occur as the uterus shrinks to normal size. Take an over-the-counter pain medicine, such as acetaminophen (Tylenol), ibuprofen (Advil, Motrin), or naproxen (Aleve), for cramps. Read and follow all instructions on the label. Do not take aspirin, because it can cause more bleeding. Do not take acetaminophen (Tylenol) and other acetaminophen containing medications (i.e. Percocet) at the same time. Episiotomy, Lacerations or Tears  · If you have stitches, they will dissolve on their own and do not need to be removed. · Put ice or a cold pack on your painful area for 10 to 20 minutes at a time, several times a day, for the first few days. Put a thin cloth between the ice and your skin. · Sit in a few inches of warm water (sitz bath) 3 times a day and after bowel movements. The warm water helps with pain and itching. If you do not have a tub, a warm shower might help. Breast fullness  · Your breasts may overfill (engorge) in the first few days after delivery.  To help milk flow and to relieve pain, warm your breasts in the shower or by using warm, moist towels before nursing. · If you are not nursing, do not put warmth on your breasts or touch your breasts. Wear a tight bra or sports bra and use ice until the fullness goes away. This usually takes 2 to 3 days. · Put ice or a cold pack on your breast after nursing to reduce swelling and pain. Put a thin cloth between the ice and your skin. Activity  · Eat a balanced diet. Do not try to lose weight by cutting calories. Keep taking your prenatal vitamins, or take a multivitamin. · Get as much rest as you can. Try to take naps when your baby sleeps during the day. · Get some exercise every day. But do not do any heavy exercise until your doctor says it is okay. · Wait until you are healed (about 4 to 6 weeks) before you have sexual intercourse. Your doctor will tell you when it is okay to have sex. · Talk to your doctor about birth control. You can get pregnant even before your period returns. Also, you can get pregnant while you are breast-feeding. Mental Health  · Many women get the \"baby blues\" during the first few days after childbirth. You may lose sleep, feel irritable, and cry easily. You may feel happy one minute and sad the next. Hormone changes are one cause of these emotional changes. Also, the demands of a new baby, along with visits from relatives or other family needs, add to a mother's stress. The \"baby blues\" often peak around the fourth day. Then they ease up in less than 2 weeks. · If your moodiness or anxiety lasts for more than 2 weeks, or if you feel like life is not worth living, you may have postpartum depression. This is different for each mother. Some mothers with serious depression may worry intensely about their infant's well-being. Others may feel distant from their child. Some mothers might even feel that they might harm their baby. A mother may have signs of paranoia, wondering if someone is watching her. · With all the changes in your life, you may not know if you are depressed. Pregnancy sometimes causes changes in how you feel that are similar to the symptoms of depression. · Symptoms of depression include:  · Feeling sad or hopeless and losing interest in daily activities. These are the most common symptoms of depression. · Sleeping too much or not enough. · Feeling tired. You may feel as if you have no energy. · Eating too much or too little. · POSTPARTUM SUPPORT INTERNATIONAL (PSI) offers a Warm line; Chat with the Expert phone sessions; Information and Articles about Pregnancy and Postpartum Mood Disorders; Comprehensive List of Free Support Groups; Knowledgeable local coordinators who will offer support, information, and resources; Guide to Resources on [employer]; Calendar of events in the  mood disorders community; Latest News and Research; and [facility] [address] for [employer]. Remember - You are not alone; You are not to blame; With help, you will be well. 0-081-509-PPD(4773). WWW. POSTPARTUM. NET    · Writing or talking about death, such as writing suicide notes or talking about guns, knives, or pills. Keep the numbers for these national suicide hotlines: 6-878-944-TALK (2-845-602-948.547.8246) and 1-013-ZDECWSO (3-419.891.5479). If you or someone you know talks about suicide or feeling hopeless, get help right away. Constipation and Hemorrhoids  Drink plenty of fluids, enough so that your urine is light yellow or clear like water. If you have kidney, heart, or liver disease and have to limit fluids, talk with your doctor before you increase the amount of fluids you drink. · Eat plenty of fiber each day. Have a bran muffin or bran cereal for breakfast, and try eating a piece of fruit for a mid-afternoon snack. · For painful, itchy hemorrhoids, put ice or a cold pack on the area several times a day for 10 minutes at a time. Follow this by putting a warm compress on the area for another 10 to 20 minutes or by sitting in a shallow, warm bath.     When should you call for help? Call 911 anytime you think you may need emergency care. For example, call if:  · You are thinking of hurting yourself, your baby, or anyone else. · You passed out (lost consciousness). · You have symptoms of a blood clot in your lung (called a pulmonary embolism). These may include:  · Sudden chest pain. · Trouble breathing. · Coughing up blood. Call your doctor now or seek immediate medical care if:  · You have severe vaginal bleeding. · You are soaking through a pad each hour for 2 or more hours. · Your vaginal bleeding seems to be getting heavier or is still bright red 4 days after delivery. · You are dizzy or lightheaded, or you feel like you may faint. · You are vomiting or cannot keep fluids down. · You have a fever. · You have new or more belly pain. · You pass tissue (not just blood). · Your vaginal discharge smells bad. · Your belly feels tender or full and hard. · Your breasts are continuously painful or red. · You feel sad, anxious, or hopeless for more than a few days. · You have sudden, severe pain in your belly. · You have symptoms of a blood clot in your leg (called a deep vein thrombosis), such as:  · Pain in your calf, back of the knee, thigh, or groin. · Redness and swelling in your leg or groin. · You have symptoms of preeclampsia, such as:  · Sudden swelling of your face, hands, or feet. · New vision problems (such as dimness or blurring). · A severe headache. · Your blood pressure is higher than it should be or rises suddenly. · You have new nausea or vomiting. Watch closely for changes in your health, and be sure to contact your doctor if you have any problems. Additional Information:  Preventing Infection at Home    We care about preventing infection and avoiding the spread of germs - not only when you are in the hospital but also when you return home.  When you return home from the hospital, it's important to take the following steps to help prevent infection and avoid spreading germs that could infect you and others. Ask everyone in your home to follow these guidelines, too. Clean Your Hands  · Clean your hands whenever your hands are visibly dirty, before you eat, before or after touching your mouth, nose or eyes, and before preparing food. Clean them after contact with body fluids, using the restroom, touching animals or changing diapers. · When washing hands, wet them with warm water and work up a lather. Rub hands for at least 15 seconds, then rinse them and pat them dry with a clean towel or paper towel. · When using hand sanitizers, it should take about 15 seconds to rub your hands dry. If not, you probably didn't apply enough . Cover Your Sneeze or Cough  Germs are released into the air whenever you sneeze or cough. To prevent the spread of infection:  · Turn away from other people before coughing or sneezing. · Cover your mouth or nose with a tissue when you cough or sneeze. Put the tissue in the trash. · If you don't have a tissue, cough or sneeze into your upper sleeve, not your hands. · Always clean your hands after coughing or sneezing. Care for Wounds  Your skin is your body's first line of defense against germs, but an open wound leaves an easy way for germs to enter your body. To prevent infection:  · Clean your hands before and after changing wound dressings, and wear gloves to change dressings if recommended by your doctor. · Take special care with IV lines or other devices inserted into the body. If you must touch them, clean your hands first.  · Follow any specific instructions from your doctor to care for your wounds. Contact your doctor if you experience any signs of infection, such as fever or increased redness at the surgical or wound site. Keep a Clean Home  · Clean or wipe commonly touched hard surfaces like door handles, sinks, tabletops, phones and TV remotes.   · Use products labeled \"disinfectant\" to kill harmful bacteria and viruses. · Use a clean cloth or paper towel to clean and dry surfaces. Wiping surfaces with a dirty dishcloth, sponge or towel will only spread germs. · Never share toothbrushes, leal, drinking glasses, utensils, razor blades, face cloths or bath towels to avoid spreading germs. · Be sure that the linens that you sleep on are clean. · Keep pets away from wounds and wash your hands after touching pets, their toys or bedding. We care about you and your health. Remember, preventing infections is a   team effort between you, your family, friends and health care providers. These are general instructions for a healthy lifestyle:    No smoking/ No tobacco products/ Avoid exposure to second hand smoke    Surgeon General's Warning:  Quitting smoking now greatly reduces serious risk to your health. Obesity, smoking, and sedentary lifestyle greatly increases your risk for illness    A healthy diet, regular physical exercise & weight monitoring are important for maintaining a healthy lifestyle    Recognize signs and symptoms of STROKE:    F-face looks uneven    A-arms unable to move or move unevenly    S-speech slurred or non-existent    T-time-call 911 as soon as signs and symptoms begin - DO NOT go       back to bed or wait to see if you get better - TIME IS BRAIN. I have had the opportunity to make my options or choices for discharge. I have received and understand these instructions.     Signed By: Ag Hallman RN                                                                                                   Date: 5/17/2018 Time: 7:15 AM

## 2018-05-17 NOTE — PROGRESS NOTES
Post-Partum Progress Note    Patient doing well post-partum without significant complaint. She is voiding without difficulty, she reports normal lochia. Her pain is well controlled with oral pain medication. She is tolerating a general diet. Delivery information:  Information for the patient's :  Robles Ornelas, Female [[id number]]   Delivery of a 6 lb 14.8 oz (3.14 kg) female infant via Vaginal, Spontaneous Delivery on 5/15/2018 at 3:26 PM  by . Apgars were 9 and 9. Vitals:  Patient Vitals for the past 8 hrs:   BP Temp Pulse Resp   18 0635 116/71 97.6 °F (36.4 °C) 68 16   18 2332 117/80 98.1 °F (36.7 °C) 73 16     Temp (24hrs), Av.9 °F (36.6 °C), Min:97.6 °F (36.4 °C), Max:98.1 °F (36.7 °C)    Visit Vitals    /71 (BP 1 Location: Left arm, BP Patient Position: At rest)    Pulse 68    Temp 97.6 °F (36.4 °C)    Resp 16    Ht 4' 11\" (1.499 m)    Wt 184 lb (83.5 kg)    SpO2 96%    Breastfeeding Yes    BMI 37.16 kg/m2       Exam:    General: Patient without distress. Abdomen: soft, fundus firm at level of umbilicus, nontender  Lower extremities: negative for cords or tenderness. Labs: No results found for this or any previous visit (from the past 24 hour(s)). Assessment:    1. Postpartum S/P spontaneous vaginal delivery   2. Patient doing well without significant complications    Plan:  1. Continue routine postpartum care  2. Routine perineal care and maternal education   3.  Oral pain medications and bowel regimen as needed                Marcy Ayala MD

## 2018-05-17 NOTE — LACTATION NOTE
Problem: Lactation Care Plan  Goal: *Infant latching appropriately  Outcome: Resolved/Met Date Met: 05/17/18  Mom states breast feeding going well. Not seen at breast today.        Goal: *Weight loss less than 10% of birth weight  Outcome: Resolved/Met Date Met: 05/17/18  Encouraged mom to attempt feeding with baby led feeding cues. Just as sucking on fingers, rooting, mouthing. Looking for 8-12 feedings in 24 hours. Don't limit baby at breast, allow baby to come of breast on it's own. Baby may want to feed  often and may increase number of feedings on second day of life. Skin to skin encouraged.      If baby doesn't nurse,  Mom should  hand express  10-20 drops of colostrum and drip into baby's mouth, or give to baby by finger feeding, cup feeding, or spoon feeding at least every 2-3 hours. Mom may  Pump and give infant any expressed milk. If not pumping any milk, mom should contact pediatrician for possible need for supplementation.         Problem: Patient Education: Go to Patient Education Activity  Goal: Patient/Family Education  Outcome: Resolved/Met Date Met: 05/17/18  Discussed eating a healthy diet. Instructed mother to eat a variety of foods in order to get a well balanced diet. She should consume an extra 300-500 calories per day (more than her non-pregnant requirement.) These extra calories will help provide energy needed for optimal breast milk production. Mother also encouraged to \"drink to thirst\" and it is recommended that she drink fluids such as water and fruit/vegetable juice. Nutritious snacks should be available so that she can eat throughout the day to help satisfy her hunger and maintain a good milk supply. Continue taking your prenatal vitamins as long as you breast feed.      Chart shows numerous feedings, void, stool WNL. Discussed Importance of monitoring outputs and feedings on first week of  Breastfeeding.   Discussed ways to tell if baby getting enough, ie  Voids and stools, by day 7, baby should have at least  4-6 wet diapers a day, change in color of stool to a seedy yellow, and return to birth wt within 2 weeks with a steady increase after that. .  Follow up with pediatrician visit for weight check in 1-2 days reviewed. Discussed Breast feeding support groups and encouraged to call [facility] line number, [phone] or [facility] at [phone]  for any questions/problems that arise.      Encouraged mom to attempt feeding with baby led feeding cues. Just as sucking on fingers, rooting, mouthing. Looking for 8-12 feedings in 24 hours. Don't limit baby at breast, allow baby to come of breast on it's own. Baby may want to feed  often and may increase number of feedings on second day of life. Skin to skin encouraged. In 4-6 weeks, baby may go though a growth spurt and increase feedings for several days to increase your milk supply.       If baby doesn't nurse,  Mom should Pump and give infant any expressed milk. If not pumping any milk, mom should contact pediatrician for possible need for supplementation.        Engorgement Care Guidelines:  Anticipatory guidance shared. Reviewed how milk is made and normal phases of milk production. Taught care of engorged breasts  and how to help decrease engorgement.   If mom should experience engorged breast, frequent breastfeeding encouraged, cool packs around breast and motrin or Ibuprofen as ordered by your Doctor.        Call your doctor, midwife and/or lactation consultant if:   · Baby is having no wet or dirty diapers   · Baby has dark colored urine after day 3  (should be pale yellow to clear)   · Baby has dark colored stools after day 4  (should be mustard yellow, with no meconium)   · Baby has fewer wet/soiled diapers or nurses less   frequently than the goals listed here   · Mom has symptoms of mastitis   (sore breast with fever, chills, flu-like aching)

## 2018-07-24 ENCOUNTER — OFFICE VISIT (OUTPATIENT)
Dept: OBGYN CLINIC | Age: 33
End: 2018-07-24

## 2018-07-24 VITALS
DIASTOLIC BLOOD PRESSURE: 66 MMHG | SYSTOLIC BLOOD PRESSURE: 108 MMHG | BODY MASS INDEX: 31.15 KG/M2 | HEIGHT: 59 IN | WEIGHT: 154.5 LBS

## 2018-07-24 DIAGNOSIS — N89.8 VAGINAL DISCHARGE: ICD-10-CM

## 2018-07-24 LAB — WET MOUNT POCT, WMPOCT: NORMAL

## 2018-07-24 RX ORDER — LEVOTHYROXINE SODIUM 150 MCG
TABLET ORAL
COMMUNITY
Start: 2018-07-19 | End: 2020-03-05 | Stop reason: SDUPTHER

## 2018-07-24 NOTE — PROGRESS NOTES
Juan Ramon MD, [address]     Postpartum visit    Chief Complaint   Patient presents with   Jaleel Edmonds PP       Chichi Garvin is a 34 y.o. female G2 [phone] who presents for a postpartum exam.     She is now 8 weeks from a vaginal delivery delivery. No LMP recorded (lmp unknown). She has had the following significant problems since her delivery: Vaginal discharge that is yellow with odor. She reports her mood as Good. The patient is breastfeeding/pumping breast milk for her baby. The patient would like to speak with endocrinologist first about birth control. She is due for her next AE in 12 months. Past Medical History:   Diagnosis Date    Abnormal Pap smear of cervix 10/05/2017    Negative, HR HPV+, 16+, 18 negative    Anxiety     Arthritis     Bursitis     and scapulothoracic dyskinesia, right shoulder    Fatigue     Headache     Joint pain     Kidney stones     Memory loss     Due to Radiation from thryoid cancer in 2013.  Muscle pain     Pap smear for cervical cancer screening 5/14/14    negative    Psychiatric problem     Anxiety    Shoulder bursitis     right    Thyroid cancer ([facility] [address] 1/10/2013    Complete Thyroidectomy 2012. Past Surgical History:   Procedure Laterality Date    HX LITHOTRIPSY      HX ORTHOPAEDIC Left     metal plate/ 7screwes in Left wrist; Broken ulna and radius.  HX THYROIDECTOMY  10/2010    total     Current Outpatient Prescriptions   Medication Sig    SYNTHROID 150 mcg tablet     ibuprofen (MOTRIN) 600 mg tablet Take 1 Tab by mouth every six (6) hours as needed for Pain. Take with food.  PRENATAL VIT W-CA,FE,FA,<1 MG, (PRENATAL VITAMIN PO) Take  by mouth. No current facility-administered medications for this visit.       Allergies   Allergen Reactions    Amoxicillin Hives     Family History   Problem Relation Age of Onset    Elevated Lipids Mother     Heart Disease Father     Cancer Other     Headache Other  Dementia Other     MS Other     Parkinson's Disease Other     Breast Cancer Other      paternal great aunt    Breast Cancer Paternal Grandmother     Heart Disease Maternal Grandmother     Heart Disease Maternal Grandfather     MS Maternal Aunt      Social History     Social History    Marital status: SINGLE     Spouse name: N/A    Number of children: N/A    Years of education: N/A     Occupational History    Not on file. Social History Main Topics    Smoking status: Current Every Day Smoker     Packs/day: 0.50    Smokeless tobacco: Never Used      Comment: Never used vapor or e-cigs     Alcohol use No    Drug use: No      Comment: pt smoked marijuana in the past    Sexual activity: Yes     Partners: Male     Birth control/ protection: None     Other Topics Concern    Not on file     Social History Narrative     OB History      Para Term  AB Living    2 2 2 0 0 2    SAB TAB Ectopic Molar Multiple Live Births    0 0 0  0 2        Obstetric Comments    Pushed four hour hours. VAVD x1 no pop off   NC, Dr. Citlalli Garcia   5/15/2018 TP, IOL for post dates and ? SROM, slow progress, NC x1 with  after pushing 1.5 hours          Immunization History   Administered Date(s) Administered    Influenza Vaccine (Quad) 2015    Tdap 02/10/2016, 2018       Review of Systems:  History obtained from the patient  General ROS: negative for - chills, fever or weight loss  Respiratory ROS: no cough, shortness of breath, or wheezing  Cardiovascular ROS: no chest pain or dyspnea on exertion  Gastrointestinal ROS: negative for - appetite loss, change in bowel habits or nausea/vomiting  Genito-Urinary ROS: negative except for as noted in HPI    PHYSICAL EXAMINATION  Visit Vitals    /66    Ht 4' 11\" (1.499 m)    Wt 154 lb 8 oz (70.1 kg)    Breastfeeding Yes    BMI 31.21 kg/m2       Constitutional  · Appearance: well-nourished, well developed, alert, in no acute distress    HENT  · Head and Face: appears normal    Neck  · Inspection/Palpation: normal appearance, no masses or tenderness  · Lymph Nodes: no lymphadenopathy present  · Thyroid: gland size normal, nontender, no nodules or masses present on palpation    Chest  clear to auscultation, no wheezes, rales or rhonchi, symmetric air entry. Cardiac/CVS  normal rate, regular rhythm, normal S1, S2, no murmurs, rubs, clicks or gallops.     Breasts  · Inspection of Breasts: breasts symmetrical, no skin changes, no discharge present, nipple appearance normal, no skin retraction present  · Palpation of Breasts and Axillae: no masses present on palpation, no breast tenderness  · Axillary Lymph Nodes: no lymphadenopathy present    Gastrointestinal  · Abdominal Examination: abdomen non-tender to palpation, normal bowel sounds, no masses present  · Liver and spleen: no hepatomegaly present, spleen not palpable  · Hernias: no hernias identified    Genitourinary  · External Genitalia: normal appearance for age, no discharge present, no tenderness present, no inflammatory lesions present, no masses present, no atrophy present  · Vagina: normal vaginal vault without central or paravaginal defects, thin white discharge present, no inflammatory lesions present, no masses present  · Bladder: non-tender to palpation  · Urethra: appears normal  · Cervix: normal   · Uterus: normal size, shape and consistency  · Adnexa: no adnexal tenderness present, no adnexal masses present  · Perineum: perineum within normal limits, no evidence of trauma, no rashes or skin lesions present  · Anus: anus within normal limits  · Inguinal Lymph Nodes: no lymphadenopathy present    Skin  · General Inspection: no rash, no lesions identified    Neurologic/Psychiatric  · Mental Status:  · Orientation: grossly oriented to person, place and time  · Mood and Affect: mood normal, affect appropriate    Assessment:  Normal postpartum check  Encounter Diagnoses   Name Primary?  Encounter for postpartum visit Yes    Vaginal discharge        Plan:  RTO for AE or sooner prn  Discussed contraception options; r/b/a. Planned contraception: declined   She should return to normal activity  We recommend healthy balanced diet, regular exercise  We discussed safer sex practices, condom use and risk factors for sexually transmitted diseases.    Patient should notify MD if she cannot resume normal activity and exercise  Recommended continuing prenatal vitamins/folic acid

## 2018-07-24 NOTE — MR AVS SNAPSHOT
[address] [phone] Patient: Jose Carlos Martinez MRN: [id number] Visit Information Date & Time Provider Department Dept. Phone Encounter #  
 7/24/2018  3:00 PM Phil Amin MD Marco Antonio Roman [phone] [id number] Follow-up Instructions Return in about 3 months (around 10/24/2018) for Annual exam. Upcoming Health Maintenance Date Due Influenza Age 5 to Adult 8/1/2018 PAP AKA CERVICAL CYTOLOGY 10/5/2020 Allergies as of 7/24/2018  Review Complete On: 7/24/2018 By: Phil Amin MD  
  
 Severity Noted Reaction Type Reactions Amoxicillin  01/10/2013   Systemic Hives Current Immunizations  Reviewed on 5/2/2016 Name Date Influenza Vaccine Boni Me) 11/4/2015 Tdap 2/20/2018, 2/10/2016 Not reviewed this visit You Were Diagnosed With   
  
 Codes Comments Encounter for postpartum visit    -  Primary ICD-10-CM: Z39.2 ICD-9-CM: V24.2 Vitals BP Height(growth percentile) Weight(growth percentile) LMP Breastfeeding? BMI  
 108/66 4' 11\" (1.499 m) 154 lb 8 oz (70.1 kg) (LMP Unknown) Yes 31.21 kg/m2 OB Status Smoking Status Recent pregnancy Current Every Day Smoker BMI and BSA Data Body Mass Index Body Surface Area  
 31.21 kg/m 2 1.71 m 2 Preferred Pharmacy Pharmacy Name Phone [id number]- [place], [place] - [address] [facility] [phone] Your Updated Medication List  
  
   
This list is accurate as of 7/24/18  3:51 PM.  Always use your most recent med list.  
  
  
  
  
 ibuprofen 600 mg tablet Commonly known as:  MOTRIN Take 1 Tab by mouth every six (6) hours as needed for Pain. Take with food. PRENATAL VITAMIN PO Take  by mouth. SYNTHROID 150 mcg tablet Generic drug:  levothyroxine We Performed the Following AMB POC SMEAR, STAIN & [place] CHANDRA [id number] CPT(R)] Follow-up Instructions Return in about 3 months (around 10/24/2018) for Annual exam.  
  
  
Introducing [facility]! [facility] introduces [employer] patient portal. Now you can access parts of your medical record, email your doctor's office, and request medication refills online. 1. In your internet browser, go to [email or web address] 2. Click on the First Time User? Click Here link in the Sign In box. You will see the New Member Sign Up page. 3. Enter your Ev[employer] Access Code exactly as it appears below. You will not need to use this code after youve completed the sign-up process. If you do not sign up before the expiration date, you must request a new code. · Ev[employer] Access Code: [email or web address] Expires: 10/22/2018  3:46 PM 
 
4. Enter the last four digits of your Social Security Number (xxxx) and Date of Birth (mm/dd/yyyy) as indicated and click Submit. You will be taken to the next sign-up page. 5. Create a Evaneos ID. This will be your Evaneos login ID and cannot be changed, so think of one that is secure and easy to remember. 6. Create a Ev[employer] password. You can change your password at any time. 7. Enter your Password Reset Question and Answer. This can be used at a later time if you forget your password. 8. Enter your e-mail address. You will receive e-mail notification when new information is available in [address] 9. Click Sign Up. You can now view and download portions of your medical record. 10. Click the Download Summary menu link to download a portable copy of your medical information. If you have questions, please visit the Frequently Asked Questions section of the [employer] website. Remember, [employer]os is NOT to be used for urgent needs. For medical emergencies, dial 911. Now available from your iPhone and Android! Please provide this summary of care documentation to your next provider. Your primary care clinician is listed as Anand Carter. If you have any questions after today's visit, please call [phone].

## 2018-07-30 LAB
A VAGINAE DNA VAG QL NAA+PROBE: NORMAL SCORE
BVAB2 DNA VAG QL NAA+PROBE: NORMAL SCORE
C ALBICANS DNA VAG QL NAA+PROBE: NEGATIVE
C GLABRATA DNA VAG QL NAA+PROBE: NEGATIVE
MEGA1 DNA VAG QL NAA+PROBE: NORMAL SCORE
T VAGINALIS RRNA SPEC QL NAA+PROBE: NEGATIVE

## 2018-08-08 ENCOUNTER — TELEPHONE (OUTPATIENT)
Dept: MIDWIFE SERVICES | Age: 33
End: 2018-08-08

## 2018-08-08 NOTE — TELEPHONE ENCOUNTER
Pt returning phone call for results. I informed her that they were all normal and to follow up if problem persists.   She verbalized understanding;

## 2018-10-30 ENCOUNTER — OFFICE VISIT (OUTPATIENT)
Dept: OBGYN CLINIC | Age: 33
End: 2018-10-30

## 2018-10-30 VITALS
SYSTOLIC BLOOD PRESSURE: 110 MMHG | HEIGHT: 59 IN | DIASTOLIC BLOOD PRESSURE: 72 MMHG | WEIGHT: 159 LBS | BODY MASS INDEX: 32.05 KG/M2

## 2018-10-30 DIAGNOSIS — Z01.419 ENCOUNTER FOR GYNECOLOGICAL EXAMINATION (GENERAL) (ROUTINE) WITHOUT ABNORMAL FINDINGS: ICD-10-CM

## 2018-10-30 DIAGNOSIS — Z01.419 WELL WOMAN EXAM WITH ROUTINE GYNECOLOGICAL EXAM: Primary | ICD-10-CM

## 2018-10-30 NOTE — PATIENT INSTRUCTIONS

## 2018-10-30 NOTE — PROGRESS NOTES
[facility]  [email or web address]  [phone]    Mani Sandhu MD, FACOG       Annual Gynecologic Exam:  WWE <40  Chief Complaint   Patient presents with    Well Woman         Crystalse Obregon is a 34 y.o.  WHITE [place]  female who presents for an annual well woman exam.  No LMP recorded. .    With regard to the Gardisil vaccine, she has received all 3 injections. Patient reports having received all 3 injections \"back in the day\"  She does not report additional concerns today. Baby 5mo. Doing well. Menstrual status:  Her periods are heavy. Patient reports heavy periods before pregnancy. Still breast feeding; has not had periods since 2017. She does report dysmenorrhea/painful menses. Before pregnancy  She does not report irregular bleeding. Sexual history and Contraception:  Social History     Substance and Sexual Activity   Sexual Activity Yes    Partners: Male    Birth control/protection: None     She never use condoms with sexual activity  She does not reports new sexual partner(s) in the last year. The patient does not request STD testing. We recommended testing per CDC guidelines and at patient request.     Preventive Medicine History:  Her most recent Pap smear result: normal was obtained in 2017  Her most recent HR HPV screen was Positive obtained in , positive #16. She does not have a history of TWYLA 2, 3 or cervical cancer. Past Medical History:   Diagnosis Date    Abnormal Pap smear of cervix 10/05/2017    Negative, HR HPV+, 16+, 18 negative    Anxiety     Arthritis     Bursitis     and scapulothoracic dyskinesia, right shoulder    Fatigue     Headache     Joint pain     Kidney stones     Memory loss     Due to Radiation from thryoid cancer in .     Muscle pain     Pap smear for cervical cancer screening 14    negative    Psychiatric problem     Anxiety    Shoulder bursitis     right    Thyroid cancer ([facility] [address] 1/10/2013    Complete Thyroidectomy . OB History    Para Term  AB Living   2 2 2 0 0 2   SAB TAB Ectopic Molar Multiple Live Births   0 0 0   0 2      # Outcome Date GA Lbr Jorge/2nd Weight Sex Delivery Anes PTL Lv   2 Term 05/15/18 41w5d / 02:16 6 lb 14.8 oz (3.14 kg) F Vag-Spont EPIDURAL AN N KOJO   1 Term 16 40w1d 06:37 / 05:36 6 lb 7.4 oz (2.93 kg) F VAVD EPIDURAL AN N KOJO      Obstetric Comments   Pushed four hour hours. VAVD x1 no pop off    NC, Dr. Vargas Lob    5/15/2018 TP, IOL for post dates and ? SROM, slow progress, NC x1 with  after pushing 1.5 hours     Past Surgical History:   Procedure Laterality Date    HX LITHOTRIPSY      HX ORTHOPAEDIC Left     metal plate/ 7screwes in Left wrist; Broken ulna and radius.     HX THYROIDECTOMY  10/2010    total     Family History   Problem Relation Age of Onset    Elevated Lipids Mother     Heart Disease Father     Cancer Other     Headache Other     Dementia Other     MS Other     Parkinson's Disease Other     Breast Cancer Other         paternal great aunt    Breast Cancer Paternal Grandmother     Heart Disease Maternal Grandmother     Heart Disease Maternal Grandfather     MS Maternal Aunt      Social History     Socioeconomic History    Marital status: SINGLE     Spouse name: Not on file    Number of children: Not on file    Years of education: Not on file    Highest education level: Not on file   Social Needs    Financial resource strain: Not on file    Food insecurity - worry: Not on file    Food insecurity - inability: Not on file   Gatekeeper System needs - medical: Not on file   Gatekeeper System needs - non-medical: Not on file   Occupational History    Not on file   Tobacco Use    Smoking status: Former Smoker    Smokeless tobacco: Never Used    Tobacco comment: Never used vapor or e-cigs    Substance and Sexual Activity    Alcohol use: No     Alcohol/week: 0.0 oz    Drug use: No     Comment: pt smoked marijuana in the past    Sexual activity: Yes     Partners: Male     Birth control/protection: None   Other Topics Concern    Not on file   Social History Narrative    Not on file       Allergies   Allergen Reactions    Amoxicillin Hives       Current Outpatient Medications   Medication Sig    SYNTHROID 150 mcg tablet     ibuprofen (MOTRIN) 600 mg tablet Take 1 Tab by mouth every six (6) hours as needed for Pain. Take with food.  PRENATAL VIT W-CA,FE,FA,<1 MG, (PRENATAL VITAMIN PO) Take  by mouth. No current facility-administered medications for this visit. Patient Active Problem List   Diagnosis Code    Thyroid cancer ([facility] [address] C73    Analgesic overuse headache G44.40, T39.95XA    Intractable migraine without aura G43.019    Chronic migraine without aura without status migrainosus, not intractable G43.709    Pregnant Z34.90    Prenatal care of multigravida, antepartum Z28.80    Labor and delivery, indication for care O75.9       Review of Systems - History obtained from the patient  Constitutional: negative for weight loss, fever, night sweats  HEENT: negative for hearing loss, earache, congestion, snoring, sorethroat  CV: negative for chest pain, palpitations, edema  Resp: negative for cough, shortness of breath, wheezing  GI: negative for change in bowel habits, abdominal pain, black or bloody stools  : negative for frequency, dysuria, hematuria  GYN: see HPI  MSK: negative for back pain, joint pain, muscle pain  Breast: negative for breast lumps, nipple discharge, galactorrhea  Skin :negative for itching, rash, hives  Neuro: negative for dizziness, headache, confusion, weakness  Psych: negative for anxiety, depression, change in mood  Heme/lymph: negative for bleeding, bruising, pallor    Physical Exam  Visit Vitals  /72   Ht 4' 11\" (1.499 m)   Wt 159 lb (72.1 kg)   Breastfeeding?  Yes   BMI 32.11 kg/m²       Constitutional  · Appearance: well-nourished, well developed, alert, in no acute distress    HENT  · Head and Face: appears normal    Neck  · Inspection/Palpation: normal appearance, no masses or tenderness  · Lymph Nodes: no lymphadenopathy present  · Thyroid: gland size normal, nontender, no nodules or masses present on palpation    Chest  · Respiratory Effort: breathing unlabored  · Auscultation: normal breath sounds    Cardiovascular  · Heart:  · Auscultation: regular rate and rhythm without murmur    Breasts  · Inspection of Breasts: breasts symmetrical, no skin changes, no discharge present, nipple appearance normal, no skin retraction present  · Palpation of Breasts and Axillae: no masses present on palpation, no breast tenderness  · Axillary Lymph Nodes: no lymphadenopathy present    Gastrointestinal  · Abdominal Examination: abdomen non-tender to palpation, normal bowel sounds, no masses present  · Liver and spleen: no hepatomegaly present, spleen not palpable  · Hernias: no hernias identified    Genitourinary  · External Genitalia: normal appearance for age, no discharge present, no tenderness present, no inflammatory lesions present, no masses present  · Vagina: normal vaginal vault without central or paravaginal defects, no discharge present, no inflammatory lesions present, no masses present  · Bladder: non-tender to palpation  · Urethra: appears normal  · Cervix: normal   · Uterus: normal size, shape and consistency  · Adnexa: no adnexal tenderness present, no adnexal masses present  · Perineum: perineum within normal limits, no evidence of trauma, no rashes or skin lesions present  · Anus: anus within normal limits, no hemorrhoids present  · Inguinal Lymph Nodes: no lymphadenopathy present    Skin  · General Inspection: no rash, no lesions identified    Neurologic/Psychiatric  · Mental Status:  · Orientation: grossly oriented to person, place and time  · Mood and Affect: mood normal, affect appropriate    Assessment:  34 y.o.  for well woman exam  Encounter Diagnoses   Name Primary?  Well woman exam with routine gynecological exam Yes    Encounter for gynecological examination (general) (routine) without abnormal findings        Plan:  The patient was counseled about diet, exercise, healthy lifestyle  We discussed self breast exam  We discussed safer sex practices, condom use and risk factors for sexually transmitted diseases. We discussed current pap smear and HR HPV testing guidelines. We recommend follow up one year for routine annual gynecologic exam or sooner prn  We recommend routine follow up with her primary care doctor for management of chronic medical problems and non-gynecologic concerns  Handouts were given to the patient  We discussed calcium/vitamin D/weight bearing exercise and osteoporosis prevention  Declines contraception, BF    Folllow up:  [x] return for annual well woman exam in one year or sooner if she is having problems  [] follow up and ultrasound  [] 6 months  [] 3 months  [] 6 weeks   [] 1 month    Orders Placed This Encounter    PAP IG, HPV AND RFX HPV 34/62,78([id number])       No results found for any visits on 10/30/18.

## 2018-11-02 LAB
CYTOLOGIST CVX/VAG CYTO: ABNORMAL
CYTOLOGY CVX/VAG DOC THIN PREP: ABNORMAL
DX ICD CODE: ABNORMAL
DX ICD CODE: ABNORMAL
HPV I/H RISK 1 DNA CVX QL PROBE+SIG AMP: NEGATIVE
Lab: ABNORMAL
OTHER STN SPEC: ABNORMAL
PATH REPORT.FINAL DX SPEC: ABNORMAL
PATHOLOGIST CVX/VAG CYTO: ABNORMAL
STAT OF ADQ CVX/VAG CYTO-IMP: ABNORMAL

## 2018-12-06 NOTE — PROGRESS NOTES
Pap smear abnormal, recommend colposcopy with ECC, and possible endo bx  Update FS/pap. Notify patient, tickle for follow-up. Premedicate with 600mg Ibuprofen, if no contraindication (for example: pregnancy/allergy).

## 2018-12-21 ENCOUNTER — OFFICE VISIT (OUTPATIENT)
Dept: OBGYN CLINIC | Age: 33
End: 2018-12-21

## 2018-12-21 ENCOUNTER — HOSPITAL ENCOUNTER (OUTPATIENT)
Dept: LAB | Age: 33
Discharge: HOME | End: 2018-12-21

## 2018-12-21 VITALS
WEIGHT: 168 LBS | BODY MASS INDEX: 33.87 KG/M2 | SYSTOLIC BLOOD PRESSURE: 106 MMHG | HEIGHT: 59 IN | DIASTOLIC BLOOD PRESSURE: 70 MMHG

## 2018-12-21 DIAGNOSIS — R87.619 ATYPICAL GLANDULAR CELLS OF UNDETERMINED SIGNIFICANCE (AGUS) ON CERVICAL PAP SMEAR: Primary | ICD-10-CM

## 2018-12-21 DIAGNOSIS — Z86.19 HISTORY OF INFECTION DUE TO HUMAN PAPILLOMA VIRUS (HPV): ICD-10-CM

## 2018-12-21 RX ORDER — CEFDINIR 300 MG/1
CAPSULE ORAL
COMMUNITY
Start: 2018-12-19 | End: 2019-11-14 | Stop reason: ALTCHOICE

## 2018-12-21 NOTE — PATIENT INSTRUCTIONS
Colposcopy: What to Expect at [address] may feel some soreness in your vagina for a day or two if you had a biopsy. Some vaginal bleeding or discharge is normal for up to a week after a biopsy. The discharge may be dark-colored if a solution was put on your cervix. You can use a sanitary pad for the bleeding. It may take a week or two for you to get the test results. This care sheet gives you a general idea about how long it will take for you to recover. But each person recovers at a different pace. Follow the steps below to feel better as quickly as possible. How can you care for yourself at home? Activity    · You can return to work and most daily activities right after the test.   Exercise    · Do not exercise for 1 day after the test.   Medicines    · Your doctor will tell you if and when you can restart your medicines. He or she will also give you instructions about taking any new medicines.     · If you take blood thinners, such as warfarin (Coumadin), clopidogrel (Plavix), or aspirin, be sure to talk to your doctor. He or she will tell you if and when to start taking those medicines again. Make sure that you understand exactly what your doctor wants you to do.     · Take an over-the-counter pain medicine, such as acetaminophen (Tylenol), ibuprofen (Advil, Motrin), or naproxen (Aleve). Be safe with medicines. Read and follow all instructions on the label. Do not take two or more pain medicines at the same time unless the doctor told you to. Many pain medicines have acetaminophen, which is Tylenol. Too much acetaminophen (Tylenol) can be harmful. Other instructions    · Use a pad if you have some bleeding.     · Do not douche, have sexual intercourse, or use tampons for 1 week if you had a biopsy. This will allow time for your cervix to heal.     · You can take a bath or shower anytime after the test.   Follow-up care is a key part of your treatment and safety.  Be sure to make and go to all appointments, and call your doctor if you are having problems. It's also a good idea to know your test results and keep a list of the medicines you take. When should you call for help? Call your doctor now or seek immediate medical care if:    · You have severe vaginal bleeding. This means that you are soaking through your usual pads or tampons each hour for 2 or more hours.     · You have pain that does not get better after you take pain medicine.     · You have signs of infection, such as:  ? Increased pain. ? Bad-smelling vaginal discharge. ? A fever.    Watch closely for any changes in your health, and be sure to contact your doctor if:    · You have questions or concerns. Where can you learn more? Go to http://jo ann-elizabeth.info/. Enter M523 in the search box to learn more about \"Colposcopy: What to Expect at Home. \"  Current as of: March 28, 2018  Content Version: 11.8  © 1685-8924 [employer], Incorporated. Care instructions adapted under license by [employer] (which disclaims liability or warranty for this information). If you have questions about a medical condition or this instruction, always ask your healthcare professional. [address] any warranty or liability for your use of this information.

## 2018-12-21 NOTE — PROGRESS NOTES
[address] [facility]  [email or web address]  [phone]    Lesia Charles MD, [address]       Ob/Gyn visit    Chief Complaint:   Chief Complaint   Patient presents with    Colposcopy     toan pap       History of Present Illness: This is a new problem being evaluated by this provider. Mohsen Ramesh is a , 34 y.o. female [facility]   She presents for an appointment for a pap smear abnormality consisting of toan in 2018.       Cervical cancer risk assessment:  Number of lifetime sexual partners: about 8  Approximate age of initiation of sexual intercourse: 17years old  Tobacco use history: 1/2 ppd, now  Current tobacco use: Yes  Sexually transmitted disease exposure: Yes, HR HPV       Currently taking PNV/folic acid: YES    LMP: No LMP recorded (lmp unknown). PFSH:  Past Medical History:   Diagnosis Date    Abnormal Pap smear of cervix 10/05/2017    Negative, HR HPV+, 16+, 18 negative    Anxiety     Arthritis     Bursitis     and scapulothoracic dyskinesia, right shoulder    Fatigue     Headache     Joint pain     Kidney stones     Memory loss     Due to Radiation from thyroid cancer in .  Muscle pain     Pap smear for cervical cancer screening 14    negative    Psychiatric problem     Anxiety    Shoulder bursitis     right    Thyroid cancer ([address] 1/10/2013    Complete Thyroidectomy . Past Surgical History:   Procedure Laterality Date    HX LITHOTRIPSY      HX ORTHOPAEDIC Left     metal plate/ 7screwes in Left wrist; Broken ulna and radius.     HX THYROIDECTOMY  10/2010    total     Family History   Problem Relation Age of Onset    Elevated Lipids Mother     Heart Disease Father     Cancer Other     Headache Other     Dementia Other     MS Other     Parkinson's Disease Other     Breast Cancer Other         paternal great aunt    Breast Cancer Paternal Grandmother     Heart Disease Maternal Grandmother     Heart Disease Maternal Grandfather     MS Maternal Aunt      Social History     Socioeconomic History    Marital status: SINGLE     Spouse name: Not on file    Number of children: Not on file    Years of education: Not on file    Highest education level: Not on file   Social Needs    Financial resource strain: Not on file    Food insecurity - worry: Not on file    Food insecurity - inability: Not on file    Transportation needs - medical: Not on file   [employer] needs - non-medical: Not on file   Occupational History    Not on file   Tobacco Use    Smoking status: Former Smoker    Smokeless tobacco: Never Used    Tobacco comment: Never used vapor or e-cigs    Substance and Sexual Activity    Alcohol use: No     Alcohol/week: 0.0 oz    Drug use: No     Comment: pt smoked marijuana in the past    Sexual activity: Yes     Partners: Male     Birth control/protection: None   Other Topics Concern    Not on file   Social History Narrative    Not on file       Allergies   Allergen Reactions    Amoxicillin Hives     Current Outpatient Medications   Medication Sig    cefdinir (OMNICEF) 300 mg capsule     SYNTHROID 150 mcg tablet     ibuprofen (MOTRIN) 600 mg tablet Take 1 Tab by mouth every six (6) hours as needed for Pain. Take with food.  PRENATAL VIT W-CA,FE,FA,<1 MG, (PRENATAL VITAMIN PO) Take  by mouth. No current facility-administered medications for this visit.         Review of Systems:  History obtained from the patient  Constitutional: negative for fevers, chills and weight loss  ENT ROS: negative for - hearing change, oral lesions or visual changes  Respiratory: negative for cough, wheezing or dyspnea on exertion  Cardiovascular: negative for chest pain, irregular heart beats, exertional chest pressure/discomfort  Gastrointestinal: negative for dysphagia, nausea and vomiting  Genito-Urinary ROS: no dysuria, trouble voiding, or hematuria  Inteument/breast: negative for rash, breast lump and nipple discharge  Musculoskeletal:negative for stiff joints, neck pain and muscle weakness  Endocrine ROS: negative for - breast changes, galactorrhea or temperature intolerance  Hematological and Lymphatic ROS: negative for - blood clots, bruising or swollen lymph nodes    Physical Exam:  Visit Vitals  /70   Ht 4' 11\" (1.499 m)   Wt 168 lb (76.2 kg)   Breastfeeding? Yes   BMI 33.93 kg/m²       GENERAL: alert, well appearing, and in no distress  HEAD: normocephalic, atraumatic. ABDOMEN: soft, nontender, nondistended, no masses or organomegaly   EGBUS: no lesions, no inflammation, no masses  VULVA: normal appearing vulva with no masses, tenderness or lesions  VAGINA: normal appearing vagina with normal color, no lesions, no discharge  CERVIX: normal appearing cervix without discharge or lesions, non tender  UTERUS: uterus is normal size, shape, consistency and nontender   ADNEXA: normal adnexa in size, nontender and no masses  NEURO: alert, oriented, normal speech    Assessment:  Encounter Diagnoses   Name Primary?  Atypical glandular cells of undetermined significance (TONE) on cervical Pap smear Yes    History of infection due to human papilloma virus (HPV)        Plan:  The patient is advised that she should contact the office if she does not note improvement or if symptoms recur  She should contact our office with any questions or concerns  She could keep her routine annual exam appointment. We reviewed her pap smear results and risk factors for cervical cancer. We discussed r/b/a of colposcopy and pt electec to proceed with procedure. After being presented with the risks, benefits and alternatives has she signed a consent for the procedure. She states that she understands the need for the procedure and has no further questions. She was informed that she may experience discomfort.   Disc potential causes of TONE and fu        Procedure Note: Colposcopy  Colposcopy procedure note:  Chart reviewed for the following:   Chuckie Rojo MD, have reviewed the History, Physical and updated the Allergic reactions for [address] [place] [address] Rd performed immediately prior to start of procedure:   I, Elder Samaniego MD, have performed the following reviews on Robin Harding prior to the start of the procedure:            * Patient was identified by name and date of birth   * Agreement on procedure being performed was verified  * Risks and Benefits explained to the patient  * Procedure site verified and marked as necessary  * Patient was positioned for comfort  * Consent was signed and verified  Time: 6986  Date of procedure: 12/21/2018    Procedure performed by: Kathie Black MD  Provider assisted by: Jose Cardona LPN  Patient assisted by: self  How tolerated by patient: tolerated the procedure well with no complications  Comments: none    She was positioned in the dorsal lithotomy position and a speculum was inserted into the vagina. Dilute acetic acid was applied to the cervix. The colposcope was used to visualize the cervix with white and green light. The transformation zone was completely visualized. This colposcopy was satisfactory. Findings:   The procedure was notable for white epithelium on the cervix. Biopsies were taken from the cervix . See accompanying image for biopsy sites. Monsels was applied to the cervix to obtain hemostasis. Endocervical currettage: An endocervical curettage was performed followed by a brush. Post Procedure Status: The patient tolerated the procedure well with minimal discomfort. She was observed for 10 minutes and released in good condition. She was given routine post-colposcopy instructions and handouts. She should notify us with any concerns, fevers or heavy bleeding. She was informed that she will be contacted with the pathology results and recommendation for follow-up.                Kathie Black MD, [address] [facility]  [address] [place] [address] [place]  [address]  [phone]     [facility]  OFFICE PROCEDURE PROGRESS NOTE        Chart reviewed for the following:   Elizabeth JACOBS MD, have reviewed the History, Physical and updated the Allergic reactions for [address] [place] [address] Rd performed immediately prior to start of procedure:   Elizabeth JACOBS MD, have performed the following reviews on Juan Manuel Coleman prior to the start of the procedure:            * Patient was identified by name and date of birth   * Agreement on procedure being performed was verified  * Risks and Benefits explained to the patient  * Procedure site verified and marked as necessary  * Patient was positioned for comfort  * Consent was signed and verified     Time: 1115      Date of procedure: 2018    Procedure performed by: Elizabeth Odom MD    Provider assisted by: Judah Gibbs LPN    Patient assisted by: self    How tolerated by patient: tolerated the procedure well with no complications    Post Procedural Pain Scale: 2 - Hurts Little Bit    Comments: none      Patient refused UPT and reports no chance she is pregnant and want to proceed with procedures. Procedure note: Endometrial biopsy     Juan Manuel Coleman is a ,  34 y.o. female [place]  No LMP recorded (lmp unknown). The patient has a history of  TONE. After the indications, risks, benefits, and alternatives to performing an endometrial biopsy were explained to the patient, her questions were answered and informed consent was obtained. Patient wanted to proceed with office endometrial biopsy evaluation. Procedure: The patient was placed on the table in the dorsal lithotomy position. A bimanual exam showed the uterus to be anterior. The uterus was not enlarged. A speculum was placed in the vagina. The cervix was visualized and prepped with zephrin. A tenaculum was placed on the anterior lip of the cervix for traction.  It was was not necessary to dilate the cervix. A pipelle was passed through the endocervical canal without difficulty. The uterus was sounded to 7 cm's. A small amount of tissue was returned. This tissue was placed in formalin and sent to pathology. It was felt that an adequate sample was obtained. The patient tolerated the procedure well and she reported level of cramping as mild. Good hemostasis was noted. All instruments were removed. Post Procedural Status: The patient was observed for 10 minutes after the procedure. She had pain level:  mild at the time of discharge. There were no complications. The patient was discharged in stable condition. The patient was given routine post endometrial biopsy instructions. She should notify MD with any questions, concerns, fever, or worsening pain. We discussed that she will be contacted with the pathology results.

## 2018-12-31 NOTE — PROGRESS NOTES
Pathology normal/benign. colpo and endo bx  Please notify patient.   Update FS per pathology protocol 1/16  Rec pap/hpv 1 year and 2 years: tickle, repeat colpo if any abnl

## 2019-09-30 NOTE — PROGRESS NOTES
Suresh aware and repeated back there were no messages on this end for pt.    _ [address] [facility]  [email or web address]  [phone]    Elias Zelaya MD, FACOG     Follow-up OB visit    Chief Complaint   Patient presents with    Routine Prenatal Visit       Vitals:    18 1429   BP: 124/70   Weight: 188 lb (85.3 kg)       Patient Active Problem List    Diagnosis Date Noted    Prenatal care of multigravida, antepartum 10/28/2017    Pregnant 2016    Chronic migraine without aura without status migrainosus, not intractable 2015    Analgesic overuse headache 2015    Intractable migraine without aura 2015    Thyroid cancer ([facility] [address] 01/10/2013       The patient reports the following concerns: none    See PN flowsheet for exam    29 y.o.  29w5d   Encounter Diagnosis   Name Primary?  Prenatal care of multigravida, antepartum      tdap     [] SAB/bleeding precautions reviewed   [x] PTL/PPROM precautions reviewed   [] Labor precautions reviewed   [] Fetal kick counts discussed   [] Labs reviewed with patient   [] Megan Person precautions reviewed   [] Consent reviewed   [] Handouts given to pt   [] Glucola handout    [] GBS/labor/Magic Hour handout   []    []    []    []    Follow-up Disposition:  Return in about 2 weeks (around 3/6/2018) for Follow up OB visit. No orders of the defined types were placed in this encounter.       Elias Zelaya MD

## 2019-10-07 ENCOUNTER — OFFICE VISIT (OUTPATIENT)
Dept: OBGYN CLINIC | Age: 34
End: 2019-10-07

## 2019-10-07 VITALS
WEIGHT: 166 LBS | BODY MASS INDEX: 33.47 KG/M2 | DIASTOLIC BLOOD PRESSURE: 58 MMHG | HEIGHT: 59 IN | SYSTOLIC BLOOD PRESSURE: 100 MMHG

## 2019-10-07 DIAGNOSIS — R35.0 URINARY FREQUENCY: ICD-10-CM

## 2019-10-07 DIAGNOSIS — R31.29 MICROSCOPIC HEMATURIA: ICD-10-CM

## 2019-10-07 DIAGNOSIS — N89.8 VAGINAL DISCHARGE: ICD-10-CM

## 2019-10-07 DIAGNOSIS — R10.2 PELVIC PAIN: Primary | ICD-10-CM

## 2019-10-07 LAB
BILIRUB UR QL STRIP: NEGATIVE
GLUCOSE UR-MCNC: NEGATIVE MG/DL
KETONES P FAST UR STRIP-MCNC: NEGATIVE MG/DL
PH UR STRIP: 5.5 [PH] (ref 4.6–8)
PROT UR QL STRIP: NORMAL
SP GR UR STRIP: 1.02 (ref 1–1.03)
UA UROBILINOGEN AMB POC: NORMAL (ref 0.2–1)
URINALYSIS CLARITY POC: CLEAR
URINALYSIS COLOR POC: YELLOW
URINE BLOOD POC: NORMAL
URINE LEUKOCYTES POC: NORMAL
URINE NITRITES POC: NEGATIVE
WET MOUNT POCT, WMPOCT: NORMAL

## 2019-10-07 RX ORDER — SULFAMETHOXAZOLE AND TRIMETHOPRIM 800; 160 MG/1; MG/1
1 TABLET ORAL 2 TIMES DAILY
Qty: 10 TAB | Refills: 0 | Status: SHIPPED | OUTPATIENT
Start: 2019-10-07 | End: 2019-10-07 | Stop reason: SDUPTHER

## 2019-10-07 RX ORDER — SULFAMETHOXAZOLE AND TRIMETHOPRIM 800; 160 MG/1; MG/1
1 TABLET ORAL 2 TIMES DAILY
Qty: 10 TAB | Refills: 0 | Status: SHIPPED | OUTPATIENT
Start: 2019-10-07 | End: 2019-10-12

## 2019-10-07 NOTE — PATIENT INSTRUCTIONS
Urinary Tract Infection in Women: Care Instructions  Your Care Instructions    A urinary tract infection, or UTI, is a general term for an infection anywhere between the kidneys and the urethra (where urine comes out). Most UTIs are bladder infections. They often cause pain or burning when you urinate. UTIs are caused by bacteria and can be cured with antibiotics. Be sure to complete your treatment so that the infection goes away. Follow-up care is a key part of your treatment and safety. Be sure to make and go to all appointments, and call your doctor if you are having problems. It's also a good idea to know your test results and keep a list of the medicines you take. How can you care for yourself at home? · Take your antibiotics as directed. Do not stop taking them just because you feel better. You need to take the full course of antibiotics. · Drink extra water and other fluids for the next day or two. This may help wash out the bacteria that are causing the infection. (If you have kidney, heart, or liver disease and have to limit fluids, talk with your doctor before you increase your fluid intake.)  · Avoid drinks that are carbonated or have caffeine. They can irritate the bladder. · Urinate often. Try to empty your bladder each time. · To relieve pain, take a hot bath or lay a heating pad set on low over your lower belly or genital area. Never go to sleep with a heating pad in place. To prevent UTIs  · Drink plenty of water each day. This helps you urinate often, which clears bacteria from your system. (If you have kidney, heart, or liver disease and have to limit fluids, talk with your doctor before you increase your fluid intake.)  · Urinate when you need to. · Urinate right after you have sex. · Change sanitary pads often. · Avoid douches, bubble baths, feminine hygiene sprays, and other feminine hygiene products that have deodorants.   · After going to the bathroom, wipe from front to back.  When should you call for help? Call your doctor now or seek immediate medical care if:    · Symptoms such as fever, chills, nausea, or vomiting get worse or appear for the first time.     · You have new pain in your back just below your rib cage. This is called flank pain.     · There is new blood or pus in your urine.     · You have any problems with your antibiotic medicine.    Watch closely for changes in your health, and be sure to contact your doctor if:    · You are not getting better after taking an antibiotic for 2 days.     · Your symptoms go away but then come back. Where can you learn more? Go to http://jo ann-elizabeth.info/. Enter T046 in the search box to learn more about \"Urinary Tract Infection in Women: Care Instructions. \"  Current as of: December 19, 2018  Content Version: 12.2  © 5242-7631 [employer]. Care instructions adapted under license by [employer] (which disclaims liability or warranty for this information). If you have questions about a medical condition or this instruction, always ask your healthcare professional. [address] any warranty or liability for your use of this information.

## 2019-10-07 NOTE — PROGRESS NOTES
[address] [facility]  [email or web address]  [phone]    Mireya Dyer MD, FACOG       OB/GYN Problem visit    Chief Complaint:   Chief Complaint   Patient presents with    Pelvic Pain       History of Present Illness: This is a new problem being evaluated by this provider. The patient is a 30 y.o.  female who reports having \"stabbing pelvic pain\" for 3 days. Had intercourse for 3 days straight leading up to the symptoms. They also recently started using condoms and pt is wondering if that has something to do with it as well. She reports the symptoms are has worsened. Aggravating factors include time. Alleviating factors include none. She does not have other concerns. LMP: Patient's last menstrual period was 2019 (exact date). [address] [facility] [address]:  Past Medical History:   Diagnosis Date    Abnormal Pap smear of cervix 10/05/2017; 10/30/18    Negative, HR HPV+, 16+, 18 negative; TONE/Neg HPV    Anxiety     Arthritis     Bursitis     and scapulothoracic dyskinesia, right shoulder    Fatigue     Headache     History of colposcopy 2018    Benign    History of endometrial biopsy 2018    Benign    Joint pain     Kidney stones     Memory loss     Due to Radiation from thyroid cancer in .  Muscle pain     Pap smear for cervical cancer screening 2014    negative    Psychiatric problem     Anxiety    Shoulder bursitis     right    Thyroid cancer ([facility] [address] 1/10/2013    Complete Thyroidectomy . Past Surgical History:   Procedure Laterality Date    HX LITHOTRIPSY      HX ORTHOPAEDIC Left     metal plate/ 7screwes in Left wrist; Broken ulna and radius.     HX THYROIDECTOMY  10/2010    total     Family History   Problem Relation Age of Onset    Elevated Lipids Mother     Heart Disease Father     Cancer Other     Headache Other     Dementia Other     MS Other     Parkinson's Disease Other     Breast Cancer Other         paternal great aunt    Breast Cancer Paternal Grandmother     Heart Disease Maternal Grandmother     Heart Disease Maternal Grandfather     MS Maternal Aunt      Social History     Tobacco Use    Smoking status: Former Smoker    Smokeless tobacco: Never Used    Tobacco comment: Never used vapor or e-cigs    Substance Use Topics    Alcohol use: No     Alcohol/week: 0.0 standard drinks    Drug use: No     Types: Marijuana     Comment: pt smoked marijuana in the past     Allergies   Allergen Reactions    Amoxicillin Hives     Current Outpatient Medications   Medication Sig    trimethoprim-sulfamethoxazole (BACTRIM DS) 160-800 mg per tablet Take 1 Tab by mouth two (2) times a day for 5 days.  SYNTHROID 150 mcg tablet     cefdinir (OMNICEF) 300 mg capsule     ibuprofen (MOTRIN) 600 mg tablet Take 1 Tab by mouth every six (6) hours as needed for Pain. Take with food.  PRENATAL VIT W-CA,FE,FA,<1 MG, (PRENATAL VITAMIN PO) Take  by mouth. No current facility-administered medications for this visit.         Review of Systems:  History obtained from the patient  Constitutional: negative for fevers, chills and weight loss  ENT ROS: negative for - hearing change, oral lesions or visual changes  Respiratory: negative for cough, wheezing or dyspnea on exertion  Cardiovascular: negative for chest pain, irregular heart beats, exertional chest pressure/discomfort  Gastrointestinal: negative for dysphagia, nausea and vomiting  Genito-Urinary ROS:  see HPI  Inteument/breast: negative for rash, breast lump and nipple discharge  Musculoskeletal:negative for stiff joints, neck pain and muscle weakness  Endocrine ROS: negative for - breast changes, galactorrhea or temperature intolerance  Hematological and Lymphatic ROS: negative for - blood clots, bruising or swollen lymph nodes    Physical Exam:  Visit Vitals  /58   Ht 4' 11\" (1.499 m)   Wt 166 lb (75.3 kg)   BMI 33.53 kg/m²       GENERAL: alert, well appearing, and in no distress  HEAD: normocephalic, atraumatic. PULM: clear to auscultation, no wheezes, rales or rhonchi, symmetric air entry   COR: normal rate and regular rhythm, S1 and S2 normal   ABDOMEN: soft, nontender, nondistended, no masses or organomegaly   EGBUS: no lesions, no inflammation, no masses  VULVA: normal appearing vulva with no masses, tenderness or lesions  VAGINA: normal appearing vagina with normal color, no lesions, white and thin discharge  CERVIX: normal appearing cervix without discharge or lesions, non tender  UTERUS: uterus is normal size, shape, consistency and nontender   ADNEXA: normal adnexa in size, nontender and no masses  NEURO: alert, oriented, normal speech    Assessment:  Encounter Diagnoses   Name Primary?  Pelvic pain Yes    Microscopic hematuria     Urinary frequency     Vaginal discharge        Plan:  The patient is advised that she should contact the office if she does not note improvement or if symptoms recur  Recommend follow up with PCP for non-gynecologic complaints and chronic medical problems. She should contact our office with any questions or concerns  She could keep her routine annual exam appointment. UTI precautions, pyelo precautions given  Disc potential causes and etiology of UTI  Notify MD if NI in symptoms or worsening symptoms: pain/fever  Recommend increased water intake, regular voiding, voiding after coitus, avoid bladder irritants  Pt notified we will contact them with culture results and alter treatment if needed  If symptoms persist after treatment or recur: rec follow up evaulation  nuswab not sent  We discussed potential causes of vaginal discharge/irritation. We discussed good vulvar hygiene. Recommended avoid vaginal irritants. Discussed use of mild soaps/detergents. Follow up if NI. We reviewed wet prep findings with the patient at her visit.         Orders Placed This Encounter    CULTURE, URINE    AMB POC URINALYSIS DIP STICK MANUAL W/O MICRO    AMB POC WET PREP (AKA STAIN, INTERPRET, WET MOUNT)    DISCONTD: trimethoprim-sulfamethoxazole (BACTRIM DS) 160-800 mg per tablet    trimethoprim-sulfamethoxazole (BACTRIM DS) 160-800 mg per tablet       Results for orders placed or performed in visit on 10/07/19   AMB POC URINALYSIS DIP STICK MANUAL W/O MICRO   Result Value Ref Range    Color (UA POC) Yellow     Clarity (UA POC) Clear     Glucose (UA POC) Negative Negative    Bilirubin (UA POC) Negative Negative    Ketones (UA POC) Negative Negative    Specific gravity (UA POC) 1.025 1.001 - 1.035    Blood (UA POC) 4+ Negative    pH (UA POC) 5.5 4.6 - 8.0    Protein (UA POC) Trace Negative    Urobilinogen (UA POC) normal 0.2 - 1    Nitrites (UA POC) Negative Negative    Leukocyte esterase (UA POC) Trace Negative   AMB POC SMEAR, STAIN & INTERPRET, WET MOUNT   Result Value Ref Range    Wet mount (POC)      Narrative    EBER    Hypae: negative  Buds: negative    Wet Prep:  Trich: negative  Clue cells: negative  Hyphae: negative  Buds: negative  WBC's: normal

## 2019-10-10 LAB — BACTERIA UR CULT: NORMAL

## 2019-10-10 NOTE — PROGRESS NOTES
The results are normal.   Please notify patient. Recommend f/u if still having symptoms/problems or has additional concerns.   Neg for UTI

## 2019-11-01 NOTE — PATIENT INSTRUCTIONS
Well Visit, Ages 25 to 48: Care Instructions  Your Care Instructions    Physical exams can help you stay healthy. Your doctor has checked your overall health and may have suggested ways to take good care of yourself. He or she also may have recommended tests. At home, you can help prevent illness with healthy eating, regular exercise, and other steps. Follow-up care is a key part of your treatment and safety. Be sure to make and go to all appointments, and call your doctor if you are having problems. It's also a good idea to know your test results and keep a list of the medicines you take. How can you care for yourself at home? · Reach and stay at a healthy weight. This will lower your risk for many problems, such as obesity, diabetes, heart disease, and high blood pressure. · Get at least 30 minutes of physical activity on most days of the week. Walking is a good choice. You also may want to do other activities, such as running, swimming, cycling, or playing tennis or team sports. Discuss any changes in your exercise program with your doctor. · Do not smoke or allow others to smoke around you. If you need help quitting, talk to your doctor about stop-smoking programs and medicines. These can increase your chances of quitting for good. · Talk to your doctor about whether you have any risk factors for sexually transmitted infections (STIs). Having one sex partner (who does not have STIs and does not have sex with anyone else) is a good way to avoid these infections. · Use birth control if you do not want to have children at this time. Talk with your doctor about the choices available and what might be best for you. · Protect your skin from too much sun. When you're outdoors from 10 a.m. to 4 p.m., stay in the shade or cover up with clothing and a hat with a wide brim. Wear sunglasses that block UV rays. Even when it's cloudy, put broad-spectrum sunscreen (SPF 30 or higher) on any exposed skin.   · See a dentist one or two times a year for checkups and to have your teeth cleaned. · Wear a seat belt in the car. Follow your doctor's advice about when to have certain tests. These tests can spot problems early. For everyone  · Cholesterol. Have the fat (cholesterol) in your blood tested after age 21. Your doctor will tell you how often to have this done based on your age, family history, or other things that can increase your risk for heart disease. · Blood pressure. Have your blood pressure checked during a routine doctor visit. Your doctor will tell you how often to check your blood pressure based on your age, your blood pressure results, and other factors. · Vision. Talk with your doctor about how often to have a glaucoma test.  · Diabetes. Ask your doctor whether you should have tests for diabetes. · Colon cancer. Your risk for colorectal cancer gets higher as you get older. Some experts say that adults should start regular screening at age 48 and stop at age 76. Others say to start before age 48 or continue after age 76. Talk with your doctor about your risk and when to start and stop screening. For women  · Breast exam and mammogram. Talk to your doctor about when you should have a clinical breast exam and a mammogram. Medical experts differ on whether and how often women under 50 should have these tests. Your doctor can help you decide what is right for you. · Cervical cancer screening test and pelvic exam. Begin with a Pap test at age 24. The test often is part of a pelvic exam. Starting at age 27, you may choose to have a Pap test, an HPV test, or both tests at the same time (called co-testing). Talk with your doctor about how often to have testing. · Tests for sexually transmitted infections (STIs). Ask whether you should have tests for STIs. You may be at risk if you have sex with more than one person, especially if your partners do not wear condoms.   For men  · Tests for sexually transmitted infections (STIs). Ask whether you should have tests for STIs. You may be at risk if you have sex with more than one person, especially if you do not wear a condom. · Testicular cancer exam. Ask your doctor whether you should check your testicles regularly. · Prostate exam. Talk to your doctor about whether you should have a blood test (called a PSA test) for prostate cancer. Experts differ on whether and when men should have this test. Some experts suggest it if you are older than 39 and are -American or have a father or brother who got prostate cancer when he was younger than 72. When should you call for help? Watch closely for changes in your health, and be sure to contact your doctor if you have any problems or symptoms that concern you. Where can you learn more? Go to http://jo ann-elizabeth.info/. Enter P072 in the search box to learn more about \"Well Visit, Ages 25 to 48: Care Instructions. \"  Current as of: December 13, 2018  Content Version: 12.2  © 2119-4378 [employer]. Care instructions adapted under license by [employer] (which disclaims liability or warranty for this information). If you have questions about a medical condition or this instruction, always ask your healthcare professional. [place] [address] any warranty or liability for your use of this information.

## 2019-11-04 ENCOUNTER — OFFICE VISIT (OUTPATIENT)
Dept: OBGYN CLINIC | Age: 34
End: 2019-11-04

## 2019-11-04 VITALS
HEIGHT: 59 IN | BODY MASS INDEX: 33.95 KG/M2 | DIASTOLIC BLOOD PRESSURE: 60 MMHG | SYSTOLIC BLOOD PRESSURE: 98 MMHG | WEIGHT: 168.4 LBS

## 2019-11-04 DIAGNOSIS — Z23 ENCOUNTER FOR IMMUNIZATION: ICD-10-CM

## 2019-11-04 DIAGNOSIS — R87.619 ATYPICAL GLANDULAR CELLS OF UNDETERMINED SIGNIFICANCE (AGUS) ON CERVICAL PAP SMEAR: ICD-10-CM

## 2019-11-04 DIAGNOSIS — N92.4 EXCESSIVE BLEEDING IN PREMENOPAUSAL PERIOD: ICD-10-CM

## 2019-11-04 DIAGNOSIS — Z01.411 ENCOUNTER FOR GYNECOLOGICAL EXAMINATION (GENERAL) (ROUTINE) WITH ABNORMAL FINDINGS: Primary | ICD-10-CM

## 2019-11-04 NOTE — PROGRESS NOTES
After obtaining consent,  and per orders of Dr. Homa Flowers, injection of FLUARIX Quadrivalent given left deltoid IM by Emily Lagos. VIS given/reviewed. Patient tolerated well without reaction and instructed to remain in clinic for 20 minutes afterwards, and to report any adverse reaction to me immediately. Patient verbalized understanding.   Lot: [id number]  Exp: 6-  NDC: 40998-238-09  : [email or web address]

## 2019-11-04 NOTE — PROGRESS NOTES
[address] [facility]  [email or web address]  [phone]    Rosendo Alexis MD, FACOG       Annual Gynecologic Exam:  WWE <40  Chief Complaint   Patient presents with    Well Woman         Jannet Galvan is a 30 y.o.  [facility] female who presents for an annual well woman exam.  Patient's last menstrual period was 10/25/2019. .    With regard to the Gardisil vaccine, she has received all 3 injections. She does not report additional concerns today. Has some questions about diet suppression. Menstrual status:  Her periods are heavy, with cramping, regular cycles. She does report dysmenorrhea/painful menses. She does not report irregular bleeding. Sexual history and Contraception:  Social History     Substance and Sexual Activity   Sexual Activity Yes    Partners: Male    Birth control/protection: Condom     She always use condoms with sexual activity  She does not reports new sexual partner(s) in the last year. The patient does not request STD testing. We recommended testing per CDC guidelines and at patient request.     Preventive Medicine History:  Her most recent Pap smear result: TONE was obtained in 2018  Her most recent HR HPV screen was Negative obtained in , Colpo was benign. She does not have a history of TWYLA 2, 3 or cervical cancer. Past Medical History:   Diagnosis Date    Abnormal Pap smear of cervix 10/05/2017; 10/30/18    Negative, HR HPV+, 16+, 18 negative; TONE/Neg HPV    Anxiety     Arthritis     Bursitis     and scapulothoracic dyskinesia, right shoulder    Fatigue     Headache     History of colposcopy 2018    Benign    History of endometrial biopsy 2018    Benign    Joint pain     Kidney stones     Memory loss     Due to Radiation from thyroid cancer in .     Muscle pain     Pap smear for cervical cancer screening 2014    negative    Psychiatric problem     Anxiety    Shoulder bursitis right    Thyroid cancer ([facility] [address] 1/10/2013    Complete Thyroidectomy . OB History    Para Term  AB Living   2 2 2 0 0 2   SAB TAB Ectopic Molar Multiple Live Births   0 0 0   0 2      # Outcome Date GA Lbr Jorge/2nd Weight Sex Delivery Anes PTL Lv   2 Term 05/15/18 41w5d / 02:16 6 lb 14.8 oz (3.14 kg) F Vag-Spont EPIDURAL AN N KOJO   1 Term 16 40w1d 06:37 / 05:36 6 lb 7.4 oz (2.93 kg) F VAVD EPIDURAL AN N KOJO      Obstetric Comments   Pushed four hour hours. VAVD x1 no pop off    NC, Dr. Ajit Ruiz    5/15/2018 TP, IOL for post dates and ? SROM, slow progress, NC x1 with  after pushing 1.5 hours     Past Surgical History:   Procedure Laterality Date    HX LITHOTRIPSY      HX ORTHOPAEDIC Left     metal plate/ 7screwes in Left wrist; Broken ulna and radius.     HX THYROIDECTOMY  10/2010    total     Family History   Problem Relation Age of Onset    Elevated Lipids Mother     Heart Disease Father     Cancer Other     Headache Other     Dementia Other     MS Other     Parkinson's Disease Other     Breast Cancer Other         paternal great aunt    Breast Cancer Paternal Grandmother     Heart Disease Maternal Grandmother     Heart Disease Maternal Grandfather     MS Maternal Aunt      Social History     Socioeconomic History    Marital status:      Spouse name: Not on file    Number of children: Not on file    Years of education: Not on file    Highest education level: Not on file   Occupational History    Not on file   Social Needs    Financial resource strain: Not on file    Food insecurity:     Worry: Not on file     Inability: Not on file    Transportation needs:     Medical: Not on file     Non-medical: Not on file   Tobacco Use    Smoking status: Former Smoker    Smokeless tobacco: Never Used    Tobacco comment: Never used vapor or e-cigs    Substance and Sexual Activity    Alcohol use: No     Alcohol/week: 0.0 standard drinks    Drug use: No     Types: Marijuana     Comment: pt smoked marijuana in the past    Sexual activity: Yes     Partners: Male     Birth control/protection: Condom   Lifestyle    Physical activity:     Days per week: Not on file     Minutes per session: Not on file    Stress: Not on file   Relationships    Social connections:     Talks on phone: Not on file     Gets together: Not on file     Attends Yazidi service: Not on file     Active member of club or organization: Not on file     Attends meetings of clubs or organizations: Not on file     Relationship status: Not on file    Intimate partner violence:     Fear of current or ex partner: Not on file     Emotionally abused: Not on file     Physically abused: Not on file     Forced sexual activity: Not on file   Other Topics Concern    Not on file   Social History Narrative    Not on file       Allergies   Allergen Reactions    Amoxicillin Hives       Current Outpatient Medications   Medication Sig    SYNTHROID 150 mcg tablet     cefdinir (OMNICEF) 300 mg capsule     ibuprofen (MOTRIN) 600 mg tablet Take 1 Tab by mouth every six (6) hours as needed for Pain. Take with food.  PRENATAL VIT W-CA,FE,FA,<1 MG, (PRENATAL VITAMIN PO) Take  by mouth. No current facility-administered medications for this visit.         Patient Active Problem List   Diagnosis Code    Thyroid cancer ([facility] [address] C73    Analgesic overuse headache G44.40, T39.95XA    Intractable migraine without aura G43.019    Chronic migraine without aura without status migrainosus, not intractable G43.709    Pregnant Z34.90    Prenatal care of multigravida, antepartum Z28.80    Labor and delivery, indication for care O75.9       Review of Systems - History obtained from the patient  Constitutional: negative for weight loss, fever, night sweats  HEENT: negative for hearing loss, earache, congestion, snoring, sorethroat  CV: negative for chest pain, palpitations, edema  Resp: negative for cough, shortness of breath, wheezing  GI: negative for change in bowel habits, abdominal pain, black or bloody stools  : negative for frequency, dysuria, hematuria  GYN: see HPI  MSK: negative for back pain, joint pain, muscle pain  Breast: negative for breast lumps, nipple discharge, galactorrhea  Skin :negative for itching, rash, hives  Neuro: negative for dizziness, headache, confusion, weakness  Psych: negative for anxiety, depression, change in mood  Heme/lymph: negative for bleeding, bruising, pallor      (WWE continued)     Physical Exam  Visit Vitals  BP 98/60 (BP 1 Location: Left arm, BP Patient Position: Sitting)   Ht 4' 11\" (1.499 m)   Wt 168 lb 6.4 oz (76.4 kg)   LMP 10/25/2019   BMI 34.01 kg/m²       Constitutional  · Appearance: well-nourished, well developed, alert, in no acute distress    HENT  · Head and Face: appears normal    Neck  · Inspection/Palpation: normal appearance, no masses or tenderness  · Lymph Nodes: no lymphadenopathy present  · Thyroid: gland size normal, nontender, no nodules or masses present on palpation    Chest  · Respiratory Effort: breathing unlabored  · Auscultation: normal breath sounds    Cardiovascular  · Heart:  · Auscultation: regular rate and rhythm without murmur    Breasts  · Inspection of Breasts: breasts symmetrical, no skin changes, no discharge present, nipple appearance normal, no skin retraction present  · Palpation of Breasts and Axillae: no masses present on palpation, no breast tenderness  · Axillary Lymph Nodes: no lymphadenopathy present    Gastrointestinal  · Abdominal Examination: abdomen non-tender to palpation, normal bowel sounds, no masses present  · Liver and spleen: no hepatomegaly present, spleen not palpable  · Hernias: no hernias identified    Genitourinary  · External Genitalia: normal appearance for age, no discharge present, no tenderness present, no inflammatory lesions present, no masses present  · Vagina: normal vaginal vault without central or paravaginal defects, no discharge present, no inflammatory lesions present, no masses present  · Bladder: non-tender to palpation  · Urethra: appears normal  · Cervix: normal   · Uterus: normal size, shape and consistency  · Adnexa: no adnexal tenderness present, no adnexal masses present  · Perineum: perineum within normal limits, no evidence of trauma, no rashes or skin lesions present  · Anus: anus within normal limits, no hemorrhoids present  · Inguinal Lymph Nodes: no lymphadenopathy present    Skin  · General Inspection: no rash, no lesions identified    Neurologic/Psychiatric  · Mental Status:  · Orientation: grossly oriented to person, place and time  · Mood and Affect: mood normal, affect appropriate    Assessment:  30 y.o.  for well woman exam  Encounter Diagnoses   Name Primary?  Atypical glandular cells of undetermined significance (TONE) on cervical Pap smear Yes    Encounter for immunization     Excessive bleeding in premenopausal period        Plan:  The patient was counseled about diet, exercise, healthy lifestyle  We discussed self breast exam  We discussed safer sex practices, condom use and risk factors for sexually transmitted diseases. We discussed current pap smear and HR HPV testing guidelines. We recommend follow up one year for routine annual gynecologic exam or sooner prn  We recommend routine follow up with her primary care doctor for management of chronic medical problems and non-gynecologic concerns  Handouts were given to the patient  We discussed calcium/vitamin D/weight bearing exercise and osteoporosis prevention  Healthy You: handout given, disc options for consult with Dr. Debora Hartman options for heavy menses  We discussed safer NSAID dosing for heavy cycles. Pt was advised to take this dose with food and not to take for more than 5 days. Disc RBA including bleeding/gastric irritation. SHANTI CONKLIN if NI to discuss other options. Disc: do not combine with other NSAIDS.   We discussed progesterone only and non hormonal options for contraception including but not limited to condoms, IUDs, Nexplanon, and depo provera. Folllow up:  [x] return for annual well woman exam in one year or sooner if she is having problems  [] follow up and ultrasound  [] 6 months  [] 3 months  [] 6 weeks   [] 1 month    Orders Placed This Encounter    FL IMMUNIZ ADMIN,1 SINGLE/COMB VAC/TOXOID    INFLUENZA VIRUS VACCINE QUADRIVALENT, PRESERVATIVE FREE SYRINGE ([id number])    PAP IG, HPV AND RFX HPV 42/03,78([id number]) ([facility])       No results found for any visits on 11/04/19.

## 2019-11-07 LAB
CYTOLOGIST CVX/VAG CYTO: NORMAL
CYTOLOGY CVX/VAG DOC CYTO: NORMAL
CYTOLOGY CVX/VAG DOC THIN PREP: NORMAL
DX ICD CODE: NORMAL
HPV I/H RISK 1 DNA CVX QL PROBE+SIG AMP: NEGATIVE
Lab: NORMAL
OTHER STN SPEC: NORMAL
STAT OF ADQ CVX/VAG CYTO-IMP: NORMAL

## 2019-11-13 NOTE — PROGRESS NOTES
Keaton Duran is a 30 y.o. female here for   Chief Complaint   Patient presents with    New Patient     referred for Thyroid       1. Have you been to the ER, urgent care clinic since your last visit? Hospitalized since your last visit? -n/a    2. Have you seen or consulted any other health care providers outside of the [address] [place] [address] since your last visit?   Include any pap smears or colon screening.-n/a

## 2019-11-14 ENCOUNTER — HOSPITAL ENCOUNTER (OUTPATIENT)
Dept: LAB | Age: 34
Discharge: HOME | End: 2019-11-14

## 2019-11-14 ENCOUNTER — OFFICE VISIT (OUTPATIENT)
Dept: ENDOCRINOLOGY | Age: 34
End: 2019-11-14

## 2019-11-14 VITALS
TEMPERATURE: 97.5 F | RESPIRATION RATE: 14 BRPM | HEART RATE: 94 BPM | DIASTOLIC BLOOD PRESSURE: 69 MMHG | HEIGHT: 59 IN | OXYGEN SATURATION: 100 % | WEIGHT: 169 LBS | BODY MASS INDEX: 34.07 KG/M2 | SYSTOLIC BLOOD PRESSURE: 110 MMHG

## 2019-11-14 DIAGNOSIS — C73 THYROID CANCER (HCC): ICD-10-CM

## 2019-11-14 DIAGNOSIS — C73 THYROID CANCER (HCC): Primary | ICD-10-CM

## 2019-11-14 LAB
T4 FREE SERPL-MCNC: 1.8 NG/DL (ref 0.8–1.5)
TSH SERPL DL<=0.05 MIU/L-ACNC: 0.02 UIU/ML (ref 0.36–3.74)

## 2019-11-14 RX ORDER — AMITRIPTYLINE HYDROCHLORIDE 50 MG/1
50 TABLET, FILM COATED ORAL
COMMUNITY
End: 2020-11-06

## 2019-11-14 NOTE — LETTER
11/21/19 Patient: Yeison Lo YOB: 1985 Date of Visit: 11/14/2019 Imelda Yee MD 
[address] [facility] [id number] VIA In Basket Dear Imelda Yee MD, Thank you for referring Ms. Corrinne Ehrich to [address] [place] [address] for evaluation. My notes for this consultation are attached. If you have questions, please do not hesitate to call me. I look forward to following your patient along with you. Sincerely, Liliana Brown MD

## 2019-11-14 NOTE — PROGRESS NOTES
Ana Hannon MD                Patient Information  Date:11/14/2019  Name : Skip Holcomb 30 y.o.     YOB: 1985             History of present illness    Skip Holcomb is a 30 y.o. female  here for evaluation of thyroid. Papillary thyroid carcinoma T3N1 2.1 cm, status post total thyroidectomy at [facility] by Dr. Elton Wei, in October 2012. She had metastatic carcinoma in 6 of 7 lymph nodes level VI nodes with vascular and lymphatic invasion. 2 parathyroid glands were removed right inferior and right superior. Thyrogen stimulated ablation February 2012 with 167 mCi with a TG of 3.6. Neck ultrasound in October was negative, initial TG was 0.8, increased to 1.1. Thyrogen stimulated TG was 40 with a negative whole-body scan. She had neck ultrasound at [facility] in December which was negative for recurrent disease. PET scan showed uptake in one axillary node in the mediastinum. She saw Dr. Maryann Nguyen at [facility],     November 2011: TSH 1.3, TG 6.6 (postop, PreI-131)  May 31, 2012, TSH less than 0.01, TG 0.8, TG antibody less than 20  October 16, 2012: TG 1.1, TG antibody less than 20, TSH 0.042  November 30, 2012: Thyrogen stimulated TG 40, TG antibody less than 20  January 18, 2013: TG 1.5, TG antibody less than 20, TSH 0.019 (on Synthroid 125 mcg)  April 2013: TG 1.5, TSH 0.45  February 2015: TG 2.2, TSH 0.3  September 2015: TSH 0.08, TG 4.6  2016: TG 6.8, TSH 0.495    Thyrogen whole-body scan: January 31, 2012  Anterior and lateral pinhole images of the neck reveal 3 small round foci of tracer uptake within the neck. One in the right neck, adjacent to the midline and 2 in the left neck located to the cephalad and lateral to the midline    Thyrogen whole-body scan iodine-131 treatment 167 mCi February 2012  Post iodine-131 treatment whole-body scan radioiodine avid thyroid tissue confined to the neck.   No imaging evidence of distant metastatic cancer. TG was 3.6  PET/CT 12/14/2012  0.7 cm right axillary lymph node exhibits increased radiotracer activity. Small lymph nodes in the axillary are normal.  Soft tissue in the anterior mediastinum is more prominent than expected for a patient of this age. There is a focus of increased tracer activity in the anterior mediastinal soft tissue on the right with a max SUV of 4.8. Soft tissue in the left aspect of the anterior mediastinum exhibits a max SUV of 3.8. No mediastinal lymphadenopathy. Impression soft tissue in the anterior mediastinum with increased metabolic activity. Right axillary lymph node with increased metabolic activity. Metastatic disease cannot be excluded. No abnormal activity in the expected location of the thyroid gland    CT chest January 2013  Anterior mediastinal opacities as described on the left rather than right. Left-sided lesion is slightly smaller than the CT images of the PET scan December 2012. The etiology of the posterior in the right anterior mediastinum is indeterminate. It could be thymic tissue. There are 2 soft tissue densities extending inferiorly along the great vessel to form a band of opacity which corresponds to the thickened pericardial surface adjacent to the great vessels. Should be noted that neither the right-sided focus in the anterior mediastinum not apparent pericardial thickening was identified on the recent PET scan. Scattered tiny nodular opacities majority of which are peripheral distribution and probably reflect small lymph nodes. She is on Synthroid, reports fluctuating TSH levels  She is on 150 mcg the longest    She was managed by endocrinologist in [place], due to the distance she is switching care. She has been taking the medication consistently. Reports fluctuating TSH levels      Celiac disease negative      No change in the size of the neck or neck pain. No dysphagia,dysphonia or dyspnea.         Wt Readings from Last 3 Encounters:   11/14/19 169 lb (76.7 kg)   11/04/19 168 lb 6.4 oz (76.4 kg)   10/07/19 166 lb (75.3 kg)       Past Medical History:   Diagnosis Date    Abnormal Pap smear of cervix 10/05/2017; 10/30/18    Negative, HR HPV+, 16+, 18 negative; TONE/Neg HPV    Anxiety     Arthritis     Bursitis     and scapulothoracic dyskinesia, right shoulder    Fatigue     Headache     History of colposcopy 12/21/2018    Benign    History of endometrial biopsy 12/21/2018    Benign    Joint pain     Kidney stones     Memory loss     Due to Radiation from thyroid cancer in 2013.  Muscle pain     Pap smear for cervical cancer screening 05/14/2014; 11/4/2019    negative; negative, HPV negative    Psychiatric problem     Anxiety    Shoulder bursitis     right    Thyroid cancer ([facility] [address] 1/10/2013    Complete Thyroidectomy 2012. Current Outpatient Medications   Medication Sig    amitriptyline (ELAVIL) 50 mg tablet Take 50 mg by mouth nightly.  SYNTHROID 150 mcg tablet     ibuprofen (MOTRIN) 600 mg tablet Take 1 Tab by mouth every six (6) hours as needed for Pain. Take with food.  PRENATAL VIT W-CA,FE,FA,<1 MG, (PRENATAL VITAMIN PO) Take  by mouth. No current facility-administered medications for this visit. Allergies   Allergen Reactions    Amoxicillin Hives         Review of Systems:  All 10 systems  reviewed and are negative other than mentioned in HPI    Physical Examination:  Blood pressure 110/69, pulse 94, temperature 97.5 °F (36.4 °C), temperature source Oral, resp.  rate 14, height 4' 11\" (1.499 m), weight 169 lb (76.7 kg), last menstrual period 10/25/2019, SpO2 100 %, currently breastfeeding.  - General: pleasant, no distress, good eye contact  - HEENT: no exopthalmos, no periorbital edema, no scleral/conjunctival injection, EOMI, no lid lag or stare  - Neck: supple, surgical scar, no neck mass, no lymphadenopathy  - Cardiovascular: regular, normal rate, normal S1 and S2,  - Respiratory: clear to auscultation bilaterally  - Gastrointestinal: soft, nontender, nondistended, BS +  - Musculoskeletal: no proximal muscle weakness in upper or lower extremities  - Integumentary: no tremors, no edema,  - Neurological:alert and oriented   - Psychiatric: normal mood and affect    Data Reviewed:     [] Reviewed labs      Assessment/Plan:     1. Thyroid cancer [facility])        Metastatic papillary thyroid cancer status post total thyroidectomy in 2012 at [address]  iodine ablation 2012 with 167 mCi, with Tg of 3.6  She has TG positive, scan negative disease without any radiological evidence. We will try to keep TSH less than 0.1  Obtain Tg, Tg antibody  Ultrasound neck,   Reviewed old records and summarized as above    TG November 2019 less than 5, stable  Ultrasound neck ordered      Post ablation hypothyroidism  TSH goal less than 0.1    Vitamin D deficiency:    There are no Patient Instructions on file for this visit. Patient /caregiver verbalized understanding   Voice-recognition software was used to generate this report, which may result in some phonetic-based errors in the grammar and contents. Even though attempts were made to correct all the mistakes, some may have been missed and remained in the body of the report.

## 2019-11-15 LAB — 25(OH)D3 SERPL-MCNC: 19.6 NG/ML (ref 30–100)

## 2019-11-18 LAB — THYROGLOB AB SERPL-ACNC: <1 IU/ML (ref 0–0.9)

## 2019-11-19 LAB
ANTI-THYROGLOBULIN ANTIBODIES, 500556: <1 IU/ML
THYROGLOBULIN (ICMA), 500542: 4.5 NG/ML
THYROGLOBULIN (TG-RIA), 500002: NORMAL NG/ML

## 2019-11-21 ENCOUNTER — TELEPHONE (OUTPATIENT)
Dept: ENDOCRINOLOGY | Age: 34
End: 2019-11-21

## 2019-11-21 RX ORDER — MELATONIN
250
COMMUNITY
End: 2020-05-22

## 2019-11-21 NOTE — TELEPHONE ENCOUNTER
----- Message from Wai Layne MD sent at 11/21/2019  2:25 PM EST -----  She is getting more thyroid medicine than she needs even considering history of thyroid cancer. Continue Synthroid 1 tablet daily on Sunday to take half tablet. The cancer marker has been stable at 4.5, did not get worse, have ordered ultrasound of the neck.   Vitamin D is low, over-the-counter 1000 to 2000 units daily

## 2019-11-21 NOTE — TELEPHONE ENCOUNTER
Per Dr. Zainab Lopez, informed pt of result note, as noted above. Pt verbalized understanding with no further questions or concerns at this time.

## 2019-11-21 NOTE — PROGRESS NOTES
She is getting more thyroid medicine than she needs even considering history of thyroid cancer. Continue Synthroid 1 tablet daily on Sunday to take tablet. The cancer marker has been stable at 4.5, did not get worse, have ordered ultrasound of the neck.   Vitamin D is low, over-the-counter 1000 to 2000 units daily

## 2019-11-21 NOTE — TELEPHONE ENCOUNTER
Attempted to call. Unsuccessful. Left msg for Fab Bucio to give us a call back at the office. A callback number was left.

## 2020-01-26 NOTE — PROGRESS NOTES
A user error has taken place: encounter opened in error, closed for administrative reasons. cleansed

## 2020-03-05 DIAGNOSIS — N89.8 VAGINAL DISCHARGE: ICD-10-CM

## 2020-03-05 RX ORDER — LEVOTHYROXINE SODIUM 150 MCG
TABLET ORAL
Qty: 30 TAB | Refills: 11 | Status: SHIPPED | OUTPATIENT
Start: 2020-03-05 | End: 2020-05-22 | Stop reason: SDUPTHER

## 2020-05-22 ENCOUNTER — VIRTUAL VISIT (OUTPATIENT)
Dept: ENDOCRINOLOGY | Age: 35
End: 2020-05-22

## 2020-05-22 DIAGNOSIS — D50.9 IRON DEFICIENCY ANEMIA, UNSPECIFIED IRON DEFICIENCY ANEMIA TYPE: ICD-10-CM

## 2020-05-22 DIAGNOSIS — D64.9 ANEMIA, UNSPECIFIED TYPE: ICD-10-CM

## 2020-05-22 DIAGNOSIS — E55.9 VITAMIN D DEFICIENCY: ICD-10-CM

## 2020-05-22 DIAGNOSIS — R53.82 CHRONIC FATIGUE: ICD-10-CM

## 2020-05-22 DIAGNOSIS — E53.8 VITAMIN B12 DEFICIENCY: ICD-10-CM

## 2020-05-22 DIAGNOSIS — N89.8 VAGINAL DISCHARGE: ICD-10-CM

## 2020-05-22 DIAGNOSIS — C73 THYROID CANCER (HCC): ICD-10-CM

## 2020-05-22 DIAGNOSIS — C73 THYROID CANCER (HCC): Primary | ICD-10-CM

## 2020-05-22 RX ORDER — LANOLIN ALCOHOL/MO/W.PET/CERES
3000 CREAM (GRAM) TOPICAL
COMMUNITY
End: 2020-11-06

## 2020-05-22 RX ORDER — CHOLECALCIFEROL TAB 125 MCG (5000 UNIT) 125 MCG
10000 TAB ORAL
COMMUNITY
Start: 2020-05-22 | End: 2022-10-30

## 2020-05-22 RX ORDER — ELETRIPTAN HYDROBROMIDE 40 MG/1
40 TABLET, FILM COATED ORAL
COMMUNITY
End: 2020-11-06

## 2020-05-22 RX ORDER — LEVOTHYROXINE SODIUM 150 MCG
TABLET ORAL
Qty: 90 TAB | Refills: 4 | Status: SHIPPED | OUTPATIENT
Start: 2020-05-22 | End: 2021-03-27 | Stop reason: SDUPTHER

## 2020-05-22 NOTE — PROGRESS NOTES
Shilpa Christiansen is a 37 y.o. female here for   Chief Complaint   Patient presents with    Thyroid Problem     Pt consented to virtual visit. 1. Have you been to the ER, urgent care clinic since your last visit? Hospitalized since your last visit? -no    2. Have you seen or consulted any other health care providers outside of the [address] [place] [address] since your last visit?   Include any pap smears or colon screening.-Neurology Dr. Jyoti Harper

## 2020-05-22 NOTE — PROGRESS NOTES
Matthew Kirkpatrick MD                Patient Information  Date:5/22/2020  Name : Lance Gonzalez 37 y.o.     YOB: 1985         Pursuant to the emergency declaration under the [facility], [facility] waiver authority and the [employer] and [employer] Act, this Virtual  Visit was conducted, with patient's consent, to reduce the patient's risk of exposure to COVID-19 . Patient  is aware that this is a billable encounter and is responsible for copays/deductibles       Services were provided through a video synchronous discussion virtually to substitute for in-person clinic visit. Place of service: Provider : Office  Patient: Home    History of present illness    Lance Gonzalez is a 37 y.o. female  here for follow-up of thyroid cancer  Papillary thyroid carcinoma T3N1 2.1 cm, status post total thyroidectomy at [facility] by Dr. No White, in October 2012. She had metastatic carcinoma in 6 of 7 lymph nodes level VI nodes with vascular and lymphatic invasion. 2 parathyroid glands were removed right inferior and right superior. Thyrogen stimulated ablation February 2012 with 167 mCi with a TG of 3.6. Neck ultrasound in October was negative, initial TG was 0.8, increased to 1.1. Thyrogen stimulated TG was 40 with a negative whole-body scan. She had neck ultrasound at [facility] in December which was negative for recurrent disease. PET scan showed uptake in one axillary node in the mediastinum.   She saw Dr. Kristin Wang at [facility],     November 2011: TSH 1.3, TG 6.6 (postop, PreI-131)  May 31, 2012, TSH less than 0.01, TG 0.8, TG antibody less than 20  October 16, 2012: TG 1.1, TG antibody less than 20, TSH 0.042  November 30, 2012: Thyrogen stimulated TG 40, TG antibody less than 20  January 18, 2013: TG 1.5, TG antibody less than 20, TSH 0.019 (on Synthroid 125 mcg)  April 2013: TG 1.5, TSH 0.45  February 2015: TG 2.2, TSH 0.3  September 2015: TSH 0.08, TG 4.6  2016: TG 6.8, TSH 0.495    Thyrogen whole-body scan: January 31, 2012  Anterior and lateral pinhole images of the neck reveal 3 small round foci of tracer uptake within the neck. One in the right neck, adjacent to the midline and 2 in the left neck located to the cephalad and lateral to the midline    Thyrogen whole-body scan iodine-131 treatment 167 mCi February 2012  Post iodine-131 treatment whole-body scan radioiodine avid thyroid tissue confined to the neck. No imaging evidence of distant metastatic cancer. TG was 3.6  PET/CT 12/14/2012  0.7 cm right axillary lymph node exhibits increased radiotracer activity. Small lymph nodes in the axillary are normal.  Soft tissue in the anterior mediastinum is more prominent than expected for a patient of this age. There is a focus of increased tracer activity in the anterior mediastinal soft tissue on the right with a max SUV of 4.8. Soft tissue in the left aspect of the anterior mediastinum exhibits a max SUV of 3.8. No mediastinal lymphadenopathy. Impression soft tissue in the anterior mediastinum with increased metabolic activity. Right axillary lymph node with increased metabolic activity. Metastatic disease cannot be excluded. No abnormal activity in the expected location of the thyroid gland    CT chest January 2013  Anterior mediastinal opacities as described on the left rather than right. Left-sided lesion is slightly smaller than the CT images of the PET scan December 2012. The etiology of the posterior in the right anterior mediastinum is indeterminate. It could be thymic tissue. There are 2 soft tissue densities extending inferiorly along the great vessel to form a band of opacity which corresponds to the thickened pericardial surface adjacent to the great vessels.   Should be noted that neither the right-sided focus in the anterior mediastinum not apparent pericardial thickening was identified on the recent PET scan. Scattered tiny nodular opacities majority of which are peripheral distribution and probably reflect small lymph nodes. She is on Synthroid, reports fluctuating TSH levels  She is on 150 mcg the longest    She was managed by endocrinologist in [place], due to the distance she is switching care. Reports fatigue, weight gain  Recently started on keto diet  She wakes up 3 times to urinate, no dry mouth, does not drink excess water, no increase in caffeine intake  Dysuria has been going on for a long time  October 2019 UA was negative for infection  No diabetes  Prior history of vitamin D deficiency, B12 deficiency        Celiac disease negative      No change in the size of the neck or neck pain. No dysphagia,dysphonia or dyspnea. Wt Readings from Last 3 Encounters:   11/14/19 169 lb (76.7 kg)   11/04/19 168 lb 6.4 oz (76.4 kg)   10/07/19 166 lb (75.3 kg)       Past Medical History:   Diagnosis Date    Abnormal Pap smear of cervix 10/05/2017; 10/30/18    Negative, HR HPV+, 16+, 18 negative; TONE/Neg HPV    Anxiety     Arthritis     Bursitis     and scapulothoracic dyskinesia, right shoulder    Fatigue     Headache     History of colposcopy 12/21/2018    Benign    History of endometrial biopsy 12/21/2018    Benign    Joint pain     Kidney stones     Memory loss     Due to Radiation from thyroid cancer in 2013.  Muscle pain     Pap smear for cervical cancer screening 05/14/2014; 11/4/2019    negative; negative, HPV negative    Psychiatric problem     Anxiety    Shoulder bursitis     right    Thyroid cancer ([facility] [address] 1/10/2013    Complete Thyroidectomy 2012. Current Outpatient Medications   Medication Sig    cyanocobalamin (Vitamin B-12) 1,000 mcg tablet Take 3,000 mcg by mouth nightly.  eletriptan (RELPAX) 40 mg tablet Take 40 mg by mouth two (2) times daily as needed.  may repeat in 2 hours if necessary    cholecalciferol (VITAMIN D3) (5000 Units/125 mcg) tab tablet Take 2 Tabs by mouth nightly.  SYNTHROID 150 mcg tablet 1 tab Mon-Sat and 1/2 tab on Sundays    amitriptyline (ELAVIL) 50 mg tablet Take 50 mg by mouth nightly.  ibuprofen (MOTRIN) 600 mg tablet Take 1 Tab by mouth every six (6) hours as needed for Pain. Take with food. No current facility-administered medications for this visit. Allergies   Allergen Reactions    Amoxicillin Hives         Review of Systems: Per HPI    Physical Examination:  currently breastfeeding. General: pleasant, no distress, good eye contact  HEENT: no exophthalmos, no periorbital edema, EOMI  Neck: No visible thyromegaly  RS: Normal respiratory effort  Musculoskeletal: no tremors  Neurological: alert and oriented  Psychiatric: normal mood and affect  Skin: Normal color  Data Reviewed:     [] Reviewed labs      Assessment/Plan:     1. Thyroid cancer [facility])        Metastatic papillary thyroid cancer status post total thyroidectomy in 2012 at [address]  iodine ablation 2012 with 167 mCi, with Tg of 3.6  She has TG positive, scan negative disease without any radiological evidence. We will try to keep TSH less than 0.1  Monitor thyroglobulin  Reordered ultrasound neck, which was ordered in November 2019 was not done      TG November 2019 less than 5, stable  Ultrasound neck reordered      Post ablation hypothyroidism  TSH goal less than 0.1    Vitamin D deficiency:    Fatigue: Sleep and disturbed due to nocturia, chronic  No UTI in October 2019  Recheck UA, to see if she is concentrating the urine appropriately  She does not drink excess caffeine or water  Overactive bladder  Recheck vitamin D B12, ferritin has heavy cycles  Prior history of anemia    There are no Patient Instructions on file for this visit. Follow-up and Dispositions    · Return in about 6 months (around 11/22/2020).              Patient /caregiver verbalized understanding   Voice-recognition software was used to generate this report, which may result in some phonetic-based errors in the grammar and contents. Even though attempts were made to correct all the mistakes, some may have been missed and remained in the body of the report.

## 2020-05-28 ENCOUNTER — HOSPITAL ENCOUNTER (OUTPATIENT)
Dept: ULTRASOUND IMAGING | Age: 35
Discharge: HOME | End: 2020-05-28
Attending: INTERNAL MEDICINE
Payer: COMMERCIAL

## 2020-05-28 DIAGNOSIS — C73 THYROID CANCER (HCC): ICD-10-CM

## 2020-05-28 PROCEDURE — 76536 US EXAM OF HEAD AND NECK: CPT

## 2020-11-05 ENCOUNTER — OFFICE VISIT (OUTPATIENT)
Dept: OBGYN CLINIC | Age: 35
End: 2020-11-05

## 2020-11-05 DIAGNOSIS — Z34.80 SUPERVISION OF OTHER NORMAL PREGNANCY, ANTEPARTUM: Primary | ICD-10-CM

## 2020-11-05 NOTE — PROGRESS NOTES
[address] [facility]  [email or web address]  [phone]    Bertram Tamez MD, FACOG       Annual Gynecologic Exam:  WWE <40  Chief Complaint   Patient presents with    Well Woman         Tanvi Ruiz is a 37 y.o.  [facility] female who presents for an annual well woman exam.  Patient's last menstrual period was 10/03/2020 (exact date). .    With regard to the Gardisil vaccine, she has not received it yet. She does report additional concerns today. Pt reports taking 2 pregnancy test last night and having positive results. Menstrual status:  Her periods are heavy. She does not report dysmenorrhea/painful menses. She does not report irregular bleeding. Sexual history and Contraception:  Social History     Substance and Sexual Activity   Sexual Activity Yes    Partners: Male    Birth control/protection: Condom     She never use condoms with sexual activity  She does not reports new sexual partner(s) in the last year. The patient does not request STD testing. We recommended testing per CDC guidelines and at patient request.     Preventive Medicine History:  Her most recent Pap smear result: normal was obtained in 2019  Her most recent HR HPV screen was Negative obtained in   She does not have a history of TWYLA 2, 3 or cervical cancer. Past Medical History:   Diagnosis Date    Abnormal Pap smear of cervix 10/05/2017; 10/30/18    Negative, HR HPV+, 16+, 18 negative; TONE/Neg HPV    Anxiety     Arthritis     Bursitis     and scapulothoracic dyskinesia, right shoulder    Fatigue     Headache     History of colposcopy 2018    Benign    History of endometrial biopsy 2018    Benign    Joint pain     Kidney stones     Memory loss     Due to Radiation from thyroid cancer in .     Muscle pain     Pap smear for cervical cancer screening 2014; 2019    negative; negative, HPV negative    Psychiatric problem     Anxiety  Shoulder bursitis     right    Thyroid cancer ([facility] [address] 1/10/2013    Complete Thyroidectomy . OB History    Para Term  AB Living   2 2 2 0 0 2   SAB TAB Ectopic Molar Multiple Live Births   0 0 0   0 2      # Outcome Date GA Lbr Jorge/2nd Weight Sex Delivery Anes PTL Lv   2 Term 05/15/18 41w5d / 02:16 6 lb 14.8 oz (3.14 kg) F Vag-Spont EPIDURAL AN N KOJO   1 Term 16 40w1d 06:37 / 05:36 6 lb 7.4 oz (2.93 kg) F VAVD EPIDURAL AN N KOJO      Obstetric Comments   Pushed four hour hours. VAVD x1 no pop off    NC, Dr. Ran Gottlieb    5/15/2018 TP, IOL for post dates and ? SROM, slow progress, NC x1 with  after pushing 1.5 hours     Past Surgical History:   Procedure Laterality Date    HX COLPOSCOPY  2017    HX LITHOTRIPSY      HX ORTHOPAEDIC Left     metal plate/ 7screwes in Left wrist; Broken ulna and radius.  HX THYROIDECTOMY  10/2010    total     Family History   Problem Relation Age of Onset    Elevated Lipids Mother     Heart Disease Father     Cancer Other     Headache Other     Dementia Other     MS Other     Parkinson's Disease Other     Breast Cancer Other         paternal great aunt    Breast Cancer Paternal Grandmother     Heart Disease Maternal Grandmother     Heart Disease Maternal Grandfather     MS Maternal Aunt      Social History     Socioeconomic History    Marital status:      Spouse name: Not on file    Number of children: Not on file    Years of education: Not on file    Highest education level: Not on file   Occupational History    Not on file   Social Needs    Financial resource strain: Not on file    Food insecurity     Worry: Not on file     Inability: Not on file    Transportation needs     Medical: Not on file     Non-medical: Not on file   Tobacco Use    Smoking status: Former Smoker    Smokeless tobacco: Never Used    Tobacco comment: Never used vapor or e-cigs    Substance and Sexual Activity    Alcohol use:  Yes Alcohol/week: 0.0 standard drinks     Comment: occasional    Drug use: No     Types: Marijuana     Comment: pt smoked marijuana in the past    Sexual activity: Yes     Partners: Male     Birth control/protection: Condom   Lifestyle    Physical activity     Days per week: Not on file     Minutes per session: Not on file    Stress: Not on file   Relationships    Social connections     Talks on phone: Not on file     Gets together: Not on file     Attends Restoration service: Not on file     Active member of club or organization: Not on file     Attends meetings of clubs or organizations: Not on file     Relationship status: Not on file    Intimate partner violence     Fear of current or ex partner: Not on file     Emotionally abused: Not on file     Physically abused: Not on file     Forced sexual activity: Not on file   Other Topics Concern    Not on file   Social History Narrative    Not on file       Allergies   Allergen Reactions    Amoxicillin Hives       Current Outpatient Medications   Medication Sig    PNV Comb #2-Iron-FA-Omega 3 29-1-400 mg cmpk Take  by mouth.  cholecalciferol (VITAMIN D3) (5000 Units/125 mcg) tab tablet Take 10,000 Units by mouth every seven (7) days.  Synthroid 150 mcg tablet 1 tab Mon-Sat and 1/2 tab on Sundays (Patient taking differently: 150 mcg. 1 tab Mon-Sat and 1/2 tab on Sundays)     No current facility-administered medications for this visit.         Patient Active Problem List   Diagnosis Code    Thyroid cancer ([facility] [address] C73    Analgesic overuse headache G44.40, T39.95XA    Intractable migraine without aura G43.019    Chronic migraine without aura without status migrainosus, not intractable G43.709    Pregnant Z34.90    Prenatal care of multigravida, antepartum Z34.80    Labor and delivery, indication for care O75.9         Review of Systems - History obtained from the patient and patient filled out questionnaire   Constitutional/general, HEENT, CV, Resp, GI, MSK, Neuro, Psych, Heme/lymph, Skin, Breast ROS: no significant complaints except as noted on HPI    Physical Exam  Visit Vitals  /84 (BP 1 Location: Right arm, BP Patient Position: Sitting)   Pulse 76   Ht 4' 11\" (1.499 m)   Wt 172 lb (78 kg)   LMP 10/03/2020 (Exact Date)   Breastfeeding No   BMI 34.74 kg/m²       Constitutional  · Appearance: well-nourished, well developed, alert, in no acute distress    HENT  · Head and Face: appears normal    Neck  · Inspection/Palpation: normal appearance, no masses or tenderness  · Lymph Nodes: no lymphadenopathy present  · Thyroid: gland size normal, nontender, no nodules or masses present on palpation    Chest  · Respiratory Effort: breathing unlabored  · Auscultation: normal breath sounds    Cardiovascular  · Heart:  · Auscultation: regular rate and rhythm without murmur    Breasts  · Inspection of Breasts: breasts symmetrical, no skin changes, no discharge present, nipple appearance normal, no skin retraction present  · Palpation of Breasts and Axillae: no masses present on palpation, no breast tenderness  · Axillary Lymph Nodes: no lymphadenopathy present    Gastrointestinal  · Abdominal Examination: abdomen non-tender to palpation, normal bowel sounds, no masses present  · Liver and spleen: no hepatomegaly present, spleen not palpable  · Hernias: no hernias identified    Genitourinary  · External Genitalia: normal appearance for age, no discharge present, no tenderness present, no inflammatory lesions present, no masses present  · Vagina: normal vaginal vault without central or paravaginal defects, minimal white discharge present, no inflammatory lesions present, no masses present  · Bladder: non-tender to palpation  · Urethra: appears normal  · Cervix: normal   · Uterus: normal size, shape and consistency  · Adnexa: no adnexal tenderness present, no adnexal masses present  · Perineum: perineum within normal limits, no evidence of trauma, no rashes or skin lesions present  · Anus: anus within normal limits, no hemorrhoids present  · Inguinal Lymph Nodes: no lymphadenopathy present    Skin  · General Inspection: no rash, no lesions identified    Neurologic/Psychiatric  · Mental Status:  · Orientation: grossly oriented to person, place and time  · Mood and Affect: mood normal, affect appropriate    Assessment:  37 y.o.  for well woman exam  Encounter Diagnoses   Name Primary?  Encounter for gynecological examination (general) (routine) without abnormal findings Yes    Needs flu shot        Plan:  The patient was counseled about diet, exercise, healthy lifestyle  We discussed current pap smear and HR HPV testing guidelines. We recommend follow up one year for routine annual gynecologic exam or sooner prn  Handouts were given to the patient  We recommend follow up with a primary care physician for chronic medical problems and evaluation of non-gynecologic concerns and to please contact our office with any GYN questions or concerns. Disc covid risks during pregnancy. rec endo fu during pregnany  Eob/us    Folllow up:  [x] return for annual well woman exam in one year or sooner if she is having problems  [] follow up and ultrasound  [] 6 months  [] 3 months  [] 6 weeks   [] 1 month    Orders Placed This Encounter    Influenza Vaccine. QUAD, PF, SYR ([facility] [id number])       No results found for any visits on 20.

## 2020-11-05 NOTE — PATIENT INSTRUCTIONS
Well Visit, Ages 25 to 48: Care Instructions  Your Care Instructions     Physical exams can help you stay healthy. Your doctor has checked your overall health and may have suggested ways to take good care of yourself. He or she also may have recommended tests. At home, you can help prevent illness with healthy eating, regular exercise, and other steps. Follow-up care is a key part of your treatment and safety. Be sure to make and go to all appointments, and call your doctor if you are having problems. It's also a good idea to know your test results and keep a list of the medicines you take. How can you care for yourself at home? · Reach and stay at a healthy weight. This will lower your risk for many problems, such as obesity, diabetes, heart disease, and high blood pressure. · Get at least 30 minutes of physical activity on most days of the week. Walking is a good choice. You also may want to do other activities, such as running, swimming, cycling, or playing tennis or team sports. Discuss any changes in your exercise program with your doctor. · Do not smoke or allow others to smoke around you. If you need help quitting, talk to your doctor about stop-smoking programs and medicines. These can increase your chances of quitting for good. · Talk to your doctor about whether you have any risk factors for sexually transmitted infections (STIs). Having one sex partner (who does not have STIs and does not have sex with anyone else) is a good way to avoid these infections. · Use birth control if you do not want to have children at this time. Talk with your doctor about the choices available and what might be best for you. · Protect your skin from too much sun. When you're outdoors from 10 a.m. to 4 p.m., stay in the shade or cover up with clothing and a hat with a wide brim. Wear sunglasses that block UV rays. Even when it's cloudy, put broad-spectrum sunscreen (SPF 30 or higher) on any exposed skin.   · See a dentist one or two times a year for checkups and to have your teeth cleaned. · Wear a seat belt in the car. Follow your doctor's advice about when to have certain tests. These tests can spot problems early. For everyone  · Cholesterol. Have the fat (cholesterol) in your blood tested after age 21. Your doctor will tell you how often to have this done based on your age, family history, or other things that can increase your risk for heart disease. · Blood pressure. Have your blood pressure checked during a routine doctor visit. Your doctor will tell you how often to check your blood pressure based on your age, your blood pressure results, and other factors. · Vision. Talk with your doctor about how often to have a glaucoma test.  · Diabetes. Ask your doctor whether you should have tests for diabetes. · Colon cancer. Your risk for colorectal cancer gets higher as you get older. Some experts say that adults should start regular screening at age 48 and stop at age 76. Others say to start before age 48 or continue after age 76. Talk with your doctor about your risk and when to start and stop screening. For women  · Breast exam and mammogram. Talk to your doctor about when you should have a clinical breast exam and a mammogram. Medical experts differ on whether and how often women under 50 should have these tests. Your doctor can help you decide what is right for you. · Cervical cancer screening test and pelvic exam. Begin with a Pap test at age 24. The test often is part of a pelvic exam. Starting at age 27, you may choose to have a Pap test, an HPV test, or both tests at the same time (called co-testing). Talk with your doctor about how often to have testing. · Tests for sexually transmitted infections (STIs). Ask whether you should have tests for STIs. You may be at risk if you have sex with more than one person, especially if your partners do not wear condoms.   For men  · Tests for sexually transmitted infections (STIs). Ask whether you should have tests for STIs. You may be at risk if you have sex with more than one person, especially if you do not wear a condom. · Testicular cancer exam. Ask your doctor whether you should check your testicles regularly. · Prostate exam. Talk to your doctor about whether you should have a blood test (called a PSA test) for prostate cancer. Experts differ on whether and when men should have this test. Some experts suggest it if you are older than 39 and are -American or have a father or brother who got prostate cancer when he was younger than 72. When should you call for help? Watch closely for changes in your health, and be sure to contact your doctor if you have any problems or symptoms that concern you. Where can you learn more? Go to http://www.ele.com/  Enter P072 in the search box to learn more about \"Well Visit, Ages 25 to 48: Care Instructions. \"  Current as of: May 27, 2020               Content Version: 12.6  © 3015-7608 [employer]. Care instructions adapted under license by [employer] (which disclaims liability or warranty for this information). If you have questions about a medical condition or this instruction, always ask your healthcare professional. [place] [address] any warranty or liability for your use of this information. Vaccine Information Statement    Influenza (Flu) Vaccine (Inactivated or Recombinant): What You Need to Know    Many Vaccine Information Statements are available in [place] and other languages. See www.immunize.org/vis  Hojas de información sobre vacunas están disponibles en español y en muchos otros idiomas. Visite www.immunize.org/vis    1. Why get vaccinated? Influenza vaccine can prevent influenza (flu). Flu is a contagious disease that spreads around the [place] every year, usually between October and May.  Anyone can get the flu, but it is more dangerous for some people. Infants and young children, people 72years of age and older, pregnant women, and people with certain health conditions or a weakened immune system are at greatest risk of flu complications. Pneumonia, bronchitis, sinus infections and ear infections are examples of flu-related complications. If you have a medical condition, such as heart disease, cancer or diabetes, flu can make it worse. Flu can cause fever and chills, sore throat, muscle aches, fatigue, cough, headache, and runny or stuffy nose. Some people may have vomiting and diarrhea, though this is more common in children than adults. Each year thousands of people in the [facility] die from flu, and many more are hospitalized. Flu vaccine prevents millions of illnesses and flu-related visits to the doctor each year. 2. Influenza vaccines     CDC recommends everyone 10months of age and older get vaccinated every flu season. Children 6 months through 6years of age may need 2 doses during a single flu season. Everyone else needs only 1 dose each flu season. It takes about 2 weeks for protection to develop after vaccination. There are many flu viruses, and they are always changing. Each year a new flu vaccine is made to protect against three or four viruses that are likely to cause disease in the upcoming flu season. Even when the vaccine doesnt exactly match these viruses, it may still provide some protection. Influenza vaccine does not cause flu. Influenza vaccine may be given at the same time as other vaccines. 3. Talk with your health care provider    Tell your vaccine provider if the person getting the vaccine:   Has had an allergic reaction after a previous dose of influenza vaccine, or has any severe, life-threatening allergies.  Has ever had Guillain-Barré Syndrome (also called GBS).     In some cases, your health care provider may decide to postpone influenza vaccination to a future visit. People with minor illnesses, such as a cold, may be vaccinated. People who are moderately or severely ill should usually wait until they recover before getting influenza vaccine. Your health care provider can give you more information. 4. Risks of a reaction     Soreness, redness, and swelling where shot is given, fever, muscle aches, and headache can happen after influenza vaccine.  There may be a very small increased risk of Guillain-Barré Syndrome (GBS) after inactivated influenza vaccine (the flu shot). Zoe Parnell children who get the flu shot along with pneumococcal vaccine (PCV13), and/or DTaP vaccine at the same time might be slightly more likely to have a seizure caused by fever. Tell your health care provider if a child who is getting flu vaccine has ever had a seizure. People sometimes faint after medical procedures, including vaccination. Tell your provider if you feel dizzy or have vision changes or ringing in the ears. As with any medicine, there is a very remote chance of a vaccine causing a severe allergic reaction, other serious injury, or death. 5. What if there is a serious problem? An allergic reaction could occur after the vaccinated person leaves the clinic. If you see signs of a severe allergic reaction (hives, swelling of the face and throat, difficulty breathing, a fast heartbeat, dizziness, or weakness), call 9-1-1 and get the person to the nearest hospital.    For other signs that concern you, call your health care provider. Adverse reactions should be reported to the Vaccine Adverse Event Reporting System (VAERS). Your health care provider will usually file this report, or you can do it yourself. Visit the VAERS website at www.vaers. hhs.gov or call 2-671.408.2277. VAERS is only for reporting reactions, and VAERS staff do not give medical advice.     6. The National Vaccine Injury Compensation Program    The [facility] Vaccine Injury Compensation Program ([address]) is a federal program that was created to compensate people who may have been injured by certain vaccines. Visit the VICP website at www.[facility].gov/vaccinecompensation or call 4-325.733.9207 to learn about the program and about filing a claim. There is a time limit to file a claim for compensation. 7. How can I learn more?  Ask your health care provider.  Call your local or state health department.  Contact the Centers for Disease Control and Prevention (CDC):  - Call 3-426.641.6389 (1-800-CDC-INFO) or  - Visit CDCs influenza website at www.cdc.gov/flu    Vaccine Information Statement (Interim)  Inactivated Influenza Vaccine   8/15/2019  [id number]   Department of Health and Human Services  Centers for Disease Control and Prevention    Office Use Only

## 2020-11-06 ENCOUNTER — OFFICE VISIT (OUTPATIENT)
Dept: OBGYN CLINIC | Age: 35
End: 2020-11-06
Payer: COMMERCIAL

## 2020-11-06 VITALS
HEART RATE: 76 BPM | DIASTOLIC BLOOD PRESSURE: 84 MMHG | BODY MASS INDEX: 34.68 KG/M2 | WEIGHT: 172 LBS | SYSTOLIC BLOOD PRESSURE: 127 MMHG | HEIGHT: 59 IN

## 2020-11-06 DIAGNOSIS — Z01.419 ENCOUNTER FOR GYNECOLOGICAL EXAMINATION (GENERAL) (ROUTINE) WITHOUT ABNORMAL FINDINGS: Primary | ICD-10-CM

## 2020-11-06 DIAGNOSIS — Z23 NEEDS FLU SHOT: ICD-10-CM

## 2020-11-06 PROCEDURE — 90686 IIV4 VACC NO PRSV 0.5 ML IM: CPT

## 2020-11-06 PROCEDURE — 90471 IMMUNIZATION ADMIN: CPT

## 2020-11-06 PROCEDURE — 99395 PREV VISIT EST AGE 18-39: CPT | Performed by: OBSTETRICS & GYNECOLOGY

## 2020-11-06 RX ORDER — AMITRIPTYLINE HYDROCHLORIDE 75 MG/1
TABLET, FILM COATED ORAL
COMMUNITY
Start: 2020-10-02 | End: 2020-11-06

## 2020-11-06 NOTE — PROGRESS NOTES
Theron Galvan is a 37 y.o. female who presents for routine flu immunization per verbal order from Ciara Sims MD.  She denies any symptoms , reactions or allergies that would exclude them from being immunized today. Risks and adverse reactions were discussed and the VIS was given to her. All questions were addressed. She was observed for 10 min post injection. There were no reactions observed.

## 2020-12-17 ENCOUNTER — OFFICE VISIT (OUTPATIENT)
Dept: OBGYN CLINIC | Age: 35
End: 2020-12-17
Payer: COMMERCIAL

## 2020-12-17 VITALS
HEART RATE: 88 BPM | BODY MASS INDEX: 36.49 KG/M2 | HEIGHT: 59 IN | WEIGHT: 181 LBS | SYSTOLIC BLOOD PRESSURE: 105 MMHG | DIASTOLIC BLOOD PRESSURE: 69 MMHG

## 2020-12-17 DIAGNOSIS — Z34.80 SUPERVISION OF OTHER NORMAL PREGNANCY, ANTEPARTUM: Primary | ICD-10-CM

## 2020-12-17 DIAGNOSIS — E07.9 DISORDER OF THYROID, ANTEPARTUM: ICD-10-CM

## 2020-12-17 DIAGNOSIS — E03.8 OTHER SPECIFIED HYPOTHYROIDISM: ICD-10-CM

## 2020-12-17 DIAGNOSIS — O99.280 DISORDER OF THYROID, ANTEPARTUM: ICD-10-CM

## 2020-12-17 LAB
ANTIBODY SCREEN, EXTERNAL: NEGATIVE
CHLAMYDIA, EXTERNAL: NEGATIVE
HBSAG, EXTERNAL: NEGATIVE
HCT, EXTERNAL: 40.2
HGB, EXTERNAL: 13.2
HIV, EXTERNAL: NEGATIVE
N. GONORRHEA, EXTERNAL: NEGATIVE
RPR, EXTERNAL: NONREACTIVE
RUBELLA, EXTERNAL: NORMAL
TYPE, ABO & RH, EXTERNAL: NORMAL

## 2020-12-17 PROCEDURE — 0500F INITIAL PRENATAL CARE VISIT: CPT | Performed by: OBSTETRICS & GYNECOLOGY

## 2020-12-17 PROCEDURE — 76801 OB US < 14 WKS SINGLE FETUS: CPT | Performed by: OBSTETRICS & GYNECOLOGY

## 2020-12-17 RX ORDER — DOCUSATE SODIUM 100 MG/1
100 CAPSULE, LIQUID FILLED ORAL 2 TIMES DAILY
COMMUNITY
End: 2021-04-26

## 2020-12-17 RX ORDER — PYRIDOXINE HCL (VITAMIN B6) 25 MG
25 TABLET ORAL DAILY
COMMUNITY
End: 2021-04-19

## 2020-12-17 RX ORDER — DIPHENHYDRAMINE HCL 25 MG
25 CAPSULE ORAL
COMMUNITY
End: 2021-04-19

## 2020-12-17 NOTE — PATIENT INSTRUCTIONS
Learning About Pregnancy  Your Care Instructions     Your health in the early weeks of your pregnancy is particularly important for your baby's health. Take good care of yourself. Anything you do that harms your body can also harm your baby. Make sure to go to all of your doctor appointments. Regular checkups will help keep you and your baby healthy. How can you care for yourself at home? Diet    · Eat a balanced diet. Make sure your diet includes plenty of beans, peas, and leafy green vegetables.     · Do not skip meals or go for many hours without eating. If you are nauseated, try to eat a small, healthy snack every 2 to 3 hours.     · Do not eat fish that has a high level of mercury, such as shark, swordfish, or mackerel. Do not eat more than one can of tuna each week.     · Drink plenty of fluids, enough so that your urine is light yellow or clear like water. If you have kidney, heart, or liver disease and have to limit fluids, talk with your doctor before you increase the amount of fluids you drink.     · Cut down on caffeine, such as coffee, tea, and cola.     · Do not drink alcohol, such as beer, wine, or hard liquor.     · Take a multivitamin that contains at least 400 micrograms (mcg) of folic acid to help prevent birth defects. Fortified cereal and whole wheat bread are good additional sources of folic acid.     · Increase the calcium in your diet. Try to drink a quart of skim milk each day. You may also take calcium supplements and choose foods such as cheese and yogurt. Lifestyle    · Make sure you go to your follow-up appointments.     · Get plenty of rest. You may be unusually tired while you are pregnant.     · Get at least 30 minutes of exercise on most days of the week. Walking is a good choice. If you have not exercised in the past, start out slowly. Take several short walks each day.     · Do not smoke. If you need help quitting, talk to your doctor about stop-smoking programs.  These can increase your chances of quitting for good.     · Do not touch cat feces or litter boxes. Also, wash your hands after you handle raw meat, and fully cook all meat before you eat it. Wear gloves when you work in the yard or garden, and wash your hands well when you are done. Cat feces, raw or undercooked meat, and contaminated dirt can cause an infection that may harm your baby or lead to a miscarriage.     · Do not use saunas or hot tubs. Raising your body temperature may harm your baby.     · Avoid chemical fumes, paint fumes, or poisons.     · Do not use illegal drugs or alcohol. Medicines    · Review all of your medicines with your doctor. Some of your routine medicines may need to be changed to protect your baby.     · Use acetaminophen (Tylenol) to relieve minor problems, such as a mild headache or backache or a mild fever with cold symptoms. Do not use nonsteroidal anti-inflammatory drugs (NSAIDs), such as ibuprofen (Advil, Motrin) or naproxen (Aleve), unless your doctor says it is okay.     · Do not take two or more pain medicines at the same time unless the doctor told you to. Many pain medicines have acetaminophen, which is Tylenol. Too much acetaminophen (Tylenol) can be harmful.     · Take your medicines exactly as prescribed. Call your doctor if you think you are having a problem with your medicine. To manage morning sickness    · If you feel sick when you first wake up, try eating a small snack (such as crackers) before you get out of bed. Allow some time to digest the snack, and then get out of bed slowly.     · Do not skip meals or go for long periods without eating. An empty stomach can make nausea worse.     · Eat small, frequent meals instead of three large meals each day.     · Drink plenty of fluids.  Sports drinks, such as Gatorade or Powerade, are good choices.     · Eat foods that are high in protein but low in fat.     · If you are taking iron supplements, ask your doctor if they are necessary. Iron can make nausea worse.     · Avoid any smells, such as coffee, that make you feel sick.     · Get lots of rest. Morning sickness may be worse when you are tired. Follow-up care is a key part of your treatment and safety. Be sure to make and go to all appointments, and call your doctor if you are having problems. It's also a good idea to know your test results and keep a list of the medicines you take. Where can you learn more? Go to http://www.gray.com/  Enter I444 in the search box to learn more about \"Learning About Pregnancy. \"  Current as of: February 11, 2020               Content Version: 12.6  © 9749-8337 [employer]. Care instructions adapted under license by [employer] (which disclaims liability or warranty for this information). If you have questions about a medical condition or this instruction, always ask your healthcare professional. [address] any warranty or liability for your use of this information.

## 2020-12-17 NOTE — PROGRESS NOTES
[facility]  [email or web address]  [phone]    Javier Galdamez MD, [facility]     Chief complaint:  Irregular cycles  Last cycle; Patient's last menstrual period was 10/03/2020 (exact date). This is a new concern and an evaluation is planned. Current pregnancy history:  Ed Coto is a , 37 y.o. female [facility]   She presents for the evaluation of irregular menses and a positive pregnancy test.    LMP history:  Patient's last menstrual period was 10/03/2020 (exact date). .  The date of the beginning of her last menstrual period certain. Her menses regular. Her cycles occur about every 4 weeks. A urine pregnancy test was positive about 5 weeks ago. She was not using contraception at the estimated time of conception. Based on her LMP, her EGA is 10 weeks,5 days with and EDC of 7/10/21. Ultrasound data:  She had an ultrasound today which revealed a viable plaza pregnancy with a gestational age of 9 weeks and 5 days giving an EDC of 7/10/21. TA ULTRASOUND PERFORMED  A SINGLE VIABLE 10W5D IUP IS SEEN WITH NORMAL CARDIAC RHYTHM. GESTATIONAL AGE BASED ON TODAYS ULTRASOUND. POSTERIOR PLACENTA IS SEEN. RIGHT OVARY IS NOT SEEN DUE TO BOWEL GAS. THE RIGHT ADNEXA APPEARS WNL. LEFT OVARY APPEARS WITHIN NORMAL LIMITS. NO FREE FLUID SEEN IN THE CDS. THERE APPEARS TO BE A ANTERIOR FIBROID SEEN MEASURING 24 X 15 X 28MM. Pregnancy symptoms:  She reports fatigue  breast tenderness  nausea  frequent urination. She denies vaginal bleeding. Since she found out she is pregnant, she has gained, 13 lbs. She reports her prepregnancy weight as 168 pounds. Relevant past pregnancy history:  She has the following pregnancy history:none. She does not have a history of  delivery. She does not have a history of a prior  section.     Relevant past medical history:(relevant to this pregnancy):   none     Pap smear history:  Last pap smear: November 4, 2019  Results: within normal limits    Occupational history  Her occupation is: chiropractic assistant. Substance history:   She does not report current tobacco use. She does not report current alcohol use. She does not report current drug use. Exposure history: There are not indoor cat(s) in the home. The patient was instructed not to change cat litter boxes during pregnancy. She does report close contact with children on a regular basis. She has chicken pox or the vaccine in the past.   Patient does not report issues with domestic violence. Genetic Screening/Teratology Counseling:   (Includes patient, baby's father, or anyone in either family with:)  3.  Patient's age >/= 28 at EDC?--36        2. Thalassemia ([facility], [facility], [address], or  background): MCV<80?--no  3. Neural tube defect (meningomyelocele, spina bifida, anencephaly)? --no  4. Congenital heart defect?--no  5. Down syndrome?--no  6. Stevie-Sachs ([address] [place] [address])? --no  7. Canavan's Disease?--no  8. Familial Dysautonomia?--no   9. Sickle cell disease or trait ()? --no   Has she been tested for sickle trait: Unknown  10. Hemophilia or other blood disorders?--no  11. Muscular dystrophy?--no  12. Cystic fibrosis? --no  13. [place]'s Chorea?--no  14. Mental retardation/autism (if yes was person tested for Fragile X)?-no  15. Other inherited genetic or chromosomal disorder?- no  16. Maternal metabolic disorder (DM, PKU, etc)? --no  17. Patient or FOB with a child with a birth defect not listed above?--no  17a. Patient or FOB with a birth defect themselves?--no  25. Recurrent pregnancy loss, or stillbirth?--no  19. Any medications since LMP other than prenatal vitamins (include vitamins, supplements, OTC meds, drugs, alcohol)? --   Current Outpatient Medications   Medication Instructions    cholecalciferol (VITAMIN D3) 10,000 Units, Oral, EVERY 7 DAYS    diphenhydrAMINE (BENADRYL) 25 mg, Oral, EVERY 6 HOURS AS NEEDED    docusate sodium (COLACE) 100 mg, Oral, 2 TIMES DAILY    doxylamine succinate (UNISOM) 25 mg, Oral, BEDTIME PRN    PNV Comb #2-Iron-FA-Omega 3 29-1-400 mg cmpk Oral    pyridoxine (vitamin B6) (VITAMIN B-6) 25 mg, Oral, DAILY    Synthroid 150 mcg tablet 1 tab Mon-Sat and 1/2 tab on Sundays       20. Any other genetic/environmental exposure to discuss?--no. Infection History:  1. Lives with someone with TB or TB exposed?--no  2. Patient or partner has history of genital herpes?--no  3. Rash or viral illness since LMP?--no  4. History of STD (GC, CT, HPV, syphilis, HIV)? --no  5. Have you received a flu vaccine for the most recent flu season? -- yes received 11/6/2020  6. Have you or your sexual partner(s) travelled to a [facility] area in the last 3 months? -- no    Past Medical History:   Diagnosis Date    Abnormal Pap smear of cervix 10/05/2017; 10/30/18    Negative, HR HPV+, 16+, 18 negative; TONE/Neg HPV    Anxiety     Arthritis     Bursitis     and scapulothoracic dyskinesia, right shoulder    Fatigue     Headache     History of colposcopy 12/21/2018    Benign    History of endometrial biopsy 12/21/2018    Benign    Joint pain     Kidney stones     Memory loss     Due to Radiation from thyroid cancer in 2013.  Muscle pain     Pap smear for cervical cancer screening 05/14/2014; 11/4/2019    negative; negative, HPV negative    Psychiatric problem     Anxiety    Shoulder bursitis     right    Thyroid cancer ([address] 1/10/2013    Complete Thyroidectomy 2012. Past Surgical History:   Procedure Laterality Date    HX COLPOSCOPY  11/2017    HX LITHOTRIPSY      HX ORTHOPAEDIC Left 2007    metal plate/ 7screwes in Left wrist; Broken ulna and radius.     HX THYROIDECTOMY  10/2010    total     Social History     Occupational History    Not on file   Tobacco Use    Smoking status: Former Smoker    Smokeless tobacco: Never Used    Tobacco comment: Never used vapor or e-cigs    Substance and Sexual Activity    Alcohol use: Yes     Alcohol/week: 0.0 standard drinks     Comment: occasional    Drug use: No     Types: Marijuana     Comment: pt smoked marijuana in the past    Sexual activity: Yes     Partners: Male     Birth control/protection: Condom     Family History   Problem Relation Age of Onset    Elevated Lipids Mother     Heart Disease Father     Cancer Other     Headache Other     Dementia Other     MS Other     Parkinson's Disease Other     Breast Cancer Other         paternal great aunt    Breast Cancer Paternal Grandmother     Heart Disease Maternal Grandmother     Heart Disease Maternal Grandfather     MS Maternal Aunt      OB History    Para Term  AB Living   3 2 2 0 0 2   SAB TAB Ectopic Molar Multiple Live Births   0 0 0   0 2      # Outcome Date GA Lbr Jorge/2nd Weight Sex Delivery Anes PTL Lv   3 Current            2 Term 05/15/18 41w5d / 02:16 6 lb 14.8 oz (3.14 kg) F Vag-Spont EPIDURAL AN N KOJO   1 Term 16 40w1d 06:37 / 05:36 6 lb 7.4 oz (2.93 kg) F VAVD EPIDURAL AN N KOJO      Obstetric Comments   Pushed four hour hours. VAVD x1 no pop off    NC, Dr. Bakari Vincent    5/15/2018 TP, IOL for post dates and ? SROM, slow progress, NC x1 with  after pushing 1.5 hours     Allergies   Allergen Reactions    Amoxicillin Hives     Prior to Admission medications    Medication Sig Start Date End Date Taking? Authorizing Provider   diphenhydrAMINE (BenadryL) 25 mg capsule Take 25 mg by mouth every six (6) hours as needed. Yes Provider, Historical   doxylamine succinate (UNISOM) 25 mg tablet Take 25 mg by mouth nightly as needed. Yes Provider, Historical   docusate sodium (Colace) 100 mg capsule Take 100 mg by mouth two (2) times a day. Yes Provider, Historical   pyridoxine, vitamin B6, (Vitamin B-6) 25 mg tablet Take 25 mg by mouth daily.    Yes Provider, Historical   PNV Comb #2-Iron-FA-Omega 3 29-1-400 mg cmpk Take  by mouth. Yes Provider, Historical   cholecalciferol (VITAMIN D3) (5000 Units/125 mcg) tab tablet Take 10,000 Units by mouth every seven (7) days. 5/22/20  Yes Hunter Mckee MD   Synthroid 150 mcg tablet 1 tab Mon-Sat and 1/2 tab on Sundays  Patient taking differently: 175 mcg.  1 tab Mon-Sat and 1/2 tab on Sundays 5/22/20  Yes Hunter Mckee MD        Review of Systems - History obtained from the patient  Constitutional: negative for weight loss, fever, night sweats  HEENT: negative for hearing loss, earache, congestion, snoring, sorethroat  CV: negative for chest pain, palpitations, edema  Resp: negative for cough, shortness of breath, wheezing  GI: negative for change in bowel habits, abdominal pain, black or bloody stools  : negative for frequency, dysuria, hematuria, vaginal discharge  MSK: negative for back pain, joint pain, muscle pain  Breast: negative for breast lumps, nipple discharge, galactorrhea  Skin :negative for itching, rash, hives  Neuro: negative for dizziness, headache, confusion, weakness  Psych: negative for anxiety, depression, change in mood  Heme/lymph: negative for bleeding, bruising, pallor    Objective:  Visit Vitals  /69   Pulse 88   Ht 4' 11\" (1.499 m)   Wt 181 lb (82.1 kg)   LMP 10/03/2020 (Exact Date)   BMI 36.56 kg/m²       Physical Exam:   Constitutional  · Appearance: well-nourished, well developed, alert, in no acute distress    HENT  · Head  · Face: appears normal  · Eyes: appear normal  · Ears: normal  · Mouth: normal  · Lips: no lesions    Neck  · Inspection/Palpation: normal appearance, no masses or tenderness  · Lymph Nodes: no lymphadenopathy present  · Thyroid: gland size normal, nontender, no nodules or masses present on palpation    Chest  · Respiratory Effort: breathing unlabored  · Auscultation: normal breath sounds    Cardiovascular  · Heart:  · Auscultation: regular rate and rhythm without murmur    Breasts  · Inspection of Breasts: breasts symmetrical, no skin changes, no discharge present, nipple appearance normal, no skin retraction present  · Palpation of Breasts and Axillae: no masses present on palpation, no breast tenderness  · Axillary Lymph Nodes: no lymphadenopathy present    Gastrointestinal  · Abdominal Examination: abdomen non-tender to palpation, normal bowel sounds, no masses present  · Liver and spleen: no hepatomegaly present, spleen not palpable  · Hernias: no hernias identified    Genitourinary  · External Genitalia: normal appearance for age, no discharge present, no tenderness present, no inflammatory lesions present, no masses present, no atrophy present  · Vagina: normal vaginal vault without central or paravaginal defects, no discharge present, no inflammatory lesions present, no masses present  · Bladder: non-tender to palpation  · Urethra: appears normal  · Cervix: normal appearing with discharge or lesions, os closed  · Uterus: enlarged, normal shape, soft  · Adnexa: no adnexal tenderness present, no adnexal masses present  · Perineum: perineum within normal limits, no evidence of trauma, no rashes or skin lesions present  · Anus: anus within normal limits, no hemorrhoids present  · Inguinal Lymph Nodes: no lymphadenopathy present    Skin  · General Inspection: no rash, no lesions identified    Neurologic/Psychiatric  · Mental Status:  · Orientation: grossly oriented to person, place and time  · Mood and Affect: mood normal, affect appropriate    Assessment:   Irregular cycles  Encounter Diagnoses   Name Primary?     Supervision of other normal pregnancy, antepartum Yes    Other specified hypothyroidism     Disorder of thyroid, antepartum      Due date: 7/10/2021    Plan:   We discussed options of genetic screening and diagnostic testing including:  CF testing, CVS, amniocentesis first trimester screening/NT, MSAFP, and NIPT (handout given to patient for review and consent)  She is interested in prenatal genetic testing of her fetus. Plan: NIPS, SMA< declined cf, she thinks she had before, 20 wk us  rec endo fu and disc importance of good thyroid control: pt has fu with Dr. Sosa Part  The course of pregnancy discussed including visit schedule, ultrasounds, lab testing, etc.  Pt advised to avoid alcoholic beverages and illicit/recreational drugs use  Recommend taking prenatal vitamins or folic acid daily with DHA/fish oil. The hospital and practice style discussed with coverage system. We discussed nutrition, toxoplasmosis precautions, sexual activity, exercise, need for influenza vaccine, environmental and work hazards, travel advice, screen for domestic violence, need for seat belts. We discussed seafood, unpasteurized dairy products, deli meat, artificial sweeteners, and caffeine intake. We recommend avoiding chemical and toxin exposures when possible. Information on prenatal and breastfeeding classes given. Information on circumcision given  Patient encouraged not to smoke. Discussed current prescription drug use. Given medication list.  Discussed the use of over the counter medications and chemicals. She is advised to contact her MD with any questions. Pt understands risk of hemorrhage during pregnancy and post delivery and would accept blood products if necessary in life-threatening emergencies  We discussed signs and symptoms of abnormal pregnancies and miscarriage. Handouts given to pt. Physician review of ultrasound performed by technician  Today's ultrasound report and images were reviewed and discussed with the patient. Please see images and imaging report entered by technician in PACS for more detail and progress note and diagnosis entered by MD.    Claudeen Angles, MD    Orders Placed This Encounter    CULTURE, URINE    CHLAMYDIA/GC PCR    HEP B SURFACE AG    HIV SCREEN, [facility]. W/REFLEX CONFIRM    CBC W/O DIFF    RUBELLA AB, IGG    RPR    MISC.  LAB TEST    SMN1 COPY NUMBER ANALYSIS ([facility])    T4, FREE    TSH 3RD GENERATION    TYPE & SCREEN    diphenhydrAMINE (BenadryL) 25 mg capsule    doxylamine succinate (UNISOM) 25 mg tablet    docusate sodium (Colace) 100 mg capsule    pyridoxine, vitamin B6, (Vitamin B-6) 25 mg tablet     Follow-up and Dispositions    · Return in about 4 weeks (around 1/14/2021).    Routing History

## 2020-12-18 LAB
ABO + RH BLD: NORMAL
BLOOD BANK CMNT PATIENT-IMP: NORMAL
BLOOD GROUP ANTIBODIES SERPL: NORMAL
ERYTHROCYTE [DISTWIDTH] IN BLOOD BY AUTOMATED COUNT: 13.3 % (ref 11.5–14.5)
HBV SURFACE AG SER QL: <0.1 INDEX
HBV SURFACE AG SER QL: NEGATIVE
HCT VFR BLD AUTO: 40.2 % (ref 35–47)
HGB BLD-MCNC: 13.2 G/DL (ref 11.5–16)
HIV 1+2 AB+HIV1 P24 AG SERPL QL IA: NONREACTIVE
HIV12 RESULT COMMENT, HHIVC: NORMAL
MCH RBC QN AUTO: 30 PG (ref 26–34)
MCHC RBC AUTO-ENTMCNC: 32.8 G/DL (ref 30–36.5)
MCV RBC AUTO: 91.4 FL (ref 80–99)
NRBC # BLD: 0 K/UL (ref 0–0.01)
NRBC BLD-RTO: 0 PER 100 WBC
PLATELET # BLD AUTO: 352 K/UL (ref 150–400)
PMV BLD AUTO: 9 FL (ref 8.9–12.9)
RBC # BLD AUTO: 4.4 M/UL (ref 3.8–5.2)
RPR SER QL: NONREACTIVE
RUBV IGG SER-IMP: REACTIVE
RUBV IGG SERPL IA-ACNC: >500 IU/ML
SPECIMEN EXP DATE BLD: NORMAL
T4 FREE SERPL-MCNC: 1.1 NG/DL (ref 0.8–1.5)
TSH SERPL DL<=0.05 MIU/L-ACNC: 1.43 UIU/ML (ref 0.36–3.74)
WBC # BLD AUTO: 9.8 K/UL (ref 3.6–11)

## 2020-12-19 LAB
BACTERIA UR CULT: NORMAL
C TRACH RRNA SPEC QL NAA+PROBE: NEGATIVE
N GONORRHOEA RRNA SPEC QL NAA+PROBE: NEGATIVE

## 2020-12-26 PROBLEM — Z34.80 PRENATAL CARE OF MULTIGRAVIDA, ANTEPARTUM: Status: RESOLVED | Noted: 2017-10-28 | Resolved: 2020-12-26

## 2020-12-27 NOTE — PROGRESS NOTES
Normal results, add to prenatal records. We can review in detail with patient at next visit. Forward thyroid labs to endocrine (?Danny Best) if pt desires.

## 2020-12-30 NOTE — PROGRESS NOTES
NIPS low risk  Notify pt if [employer] message not read or not active. Notify pt of gender, if desired.   Add to PL: NIPS- low risk, add gender if patient finds out  Update PNL  Confirm 20 wk US scheduled: add date and location (SARAH/M)  to PL

## 2020-12-31 NOTE — PROGRESS NOTES
Updated PL. Informed patient with gender results through [address] [place] [address] [id number]. Confirmed patient's 20 week fetal scan.

## 2021-01-06 LAB
CLINICAL INFO: NORMAL
ETHNIC BACKGROUND STATED: NORMAL
GENERAL COMMENTS: NORMAL
GENETIC COUNSELOR, 450001: NORMAL
INDICATION: NORMAL
LAB DIRECTOR NAME PROVIDER: NORMAL
REF LAB TEST METHOD: NORMAL
SMN1 GENE MUT ANL BLD/T: NORMAL
SPECIMEN SOURCE: NORMAL
TEST PERFORMANCE INFO SPEC: NORMAL
TEST PERFORMANCE INFO SPEC: NORMAL

## 2021-02-22 ENCOUNTER — ROUTINE PRENATAL (OUTPATIENT)
Dept: OBGYN CLINIC | Age: 36
End: 2021-02-22

## 2021-02-22 VITALS
BODY MASS INDEX: 38.34 KG/M2 | HEIGHT: 59 IN | DIASTOLIC BLOOD PRESSURE: 82 MMHG | SYSTOLIC BLOOD PRESSURE: 121 MMHG | WEIGHT: 190.2 LBS | HEART RATE: 92 BPM

## 2021-02-22 DIAGNOSIS — O26.892 DYSURIA DURING PREGNANCY IN SECOND TRIMESTER: Primary | ICD-10-CM

## 2021-02-22 DIAGNOSIS — R30.0 DYSURIA DURING PREGNANCY IN SECOND TRIMESTER: Primary | ICD-10-CM

## 2021-02-22 DIAGNOSIS — Z34.80 SUPERVISION OF OTHER NORMAL PREGNANCY, ANTEPARTUM: ICD-10-CM

## 2021-02-22 LAB — AFP, MATERNAL, EXTERNAL: NEGATIVE

## 2021-02-22 PROCEDURE — 0502F SUBSEQUENT PRENATAL CARE: CPT | Performed by: OBSTETRICS & GYNECOLOGY

## 2021-02-22 NOTE — PROGRESS NOTES
[facility]  [email or web address]  [phone]    Elton Dutton MD, FACOG       [facility]   FOLLOW UP OB NOTE WITH ULTRASOUND    Chief Complaint   Patient presents with    Routine Prenatal Visit       The patient reports the following concerns: Pt reports a \"weird sensation\"/\"tingle feeling in my bladder\", and tender, so she wants to be tested for a UTI. Denies itching or burning. Denies new partners. Pt reports headaches, sinus pains, muscle spasms in her back, and neck pain since she became pregnant. I discussed with the patient the limitations of ultrasound and that imaging can not rule out all birth defects, chromosomal problems, or other problems with the baby. D/w us tech: rec fu imaging with MFM for clarity and to complete anatomy  AFP  UTI precautions  Ur cx      US findings: FETAL SURVEY  DECREASED SCAN CLARITY DUE TO BMI >35. A SINGLE VIABLE IUP AT 20W2D GA BY LMP IS SEEN. FETAL CARDIAC MOTION OBSERVED. FETAL ANATOMY NOT WELL VISUALIZED DUE TO >BMI. ANATOMY SEEN APPEARS WITHIN NORMAL LIMITS. FACE, NOSE/LIPS, PROFILE, CSP, LAT VENT, CER/CM, CP, SPINE, KIDNEYS, STOMACH/DIAPHRAGM, BLADDER, 3VC,  CORD INSERTION, 4CH, DA, 3VV, ARMS, LEGS, HANDS, FEET. ANATOMY NOT SEEN: RVOT, LVOT. FOLLOW UP NEEDED TO COMPLETE SURVEY. APPROPRIATE FETAL GROWTH IS SEEN. SIZE=DATES. JULI, CERVIX AND PLACENTA APPEAR WITHIN NORMAL LIMITS. GENDER: XY      Physician review of ultrasound performed by technician    Today's ultrasound report and images were reviewed and discussed with the patient.   Please see images and imaging report entered by technician in PACS for more detail and progress note and diagnosis entered by MD.    Lane Boas, MD

## 2021-02-22 NOTE — PATIENT INSTRUCTIONS

## 2021-02-24 LAB
BACTERIA SPEC CULT: NORMAL
SERVICE CMNT-IMP: NORMAL

## 2021-02-25 NOTE — PROGRESS NOTES
Normal results, add to prenatal records. We can review in detail with patient at next visit. [address] message sent if active.   Add neg ur cx to pl w date

## 2021-02-26 LAB
AFP ADJ MOM SERPL: 0.9
AFP INTERP SERPL-IMP: NORMAL
AFP SERPL-MCNC: 53.5 NG/ML
AGE AT DELIVERY: 34.3 YR
COMMENT, 018013: NORMAL
GA METHOD: NORMAL
GA: 20.3 WEEKS
IDDM PATIENT QL: NO
MAT SCN FOR FETAL ABNORMALITIES SERPL: NORMAL
MULTIPLE PREGNANCY: NO
NEURAL TUBE DEFECT RISK FETUS: NORMAL %
RESULTS, 017004: NORMAL

## 2021-02-26 NOTE — PROGRESS NOTES
Normal results, add to prenatal records. We can review in detail with patient at next visit. [address] message sent if active.

## 2021-03-19 NOTE — PATIENT INSTRUCTIONS
Weeks 22 to 26 of Your Pregnancy: Care Instructions  Your Care Instructions     As you enter your 7th month of pregnancy at week 26, your baby's lungs are growing stronger and getting ready to breathe. You may notice that your baby responds to the sound of your or your partner's voice. You may also notice that your baby does less turning and twisting and more squirming or jerking. Jerking often means that your baby has the hiccups. Hiccups are perfectly normal and are only temporary. You may want to think about attending a childbirth preparation class. This is also a good time to start thinking about whether you want to have pain medicine during labor. Most pregnant women are tested for gestational diabetes between weeks 25 and 28. Gestational diabetes occurs when your blood sugar level gets too high when you're pregnant. The test is important, because you can have gestational diabetes and not know it. But the condition can cause problems for your baby. Follow-up care is a key part of your treatment and safety. Be sure to make and go to all appointments, and call your doctor if you are having problems. It's also a good idea to know your test results and keep a list of the medicines you take. How can you care for yourself at home? Ease discomfort from your baby's kicking  · Change your position. Sometimes this will cause your baby to change position too. · Take a deep breath while you raise your arm over your head. Then breathe out while you drop your arm. Do Kegel exercises to prevent urine from leaking  · You can do Kegel exercises while you stand or sit. ? Squeeze the same muscles you would use to stop your urine. Your belly and thighs should not move. ? Hold the squeeze for 3 seconds, and then relax for 3 seconds. ? Start with 3 seconds. Then add 1 second each week until you are able to squeeze for 10 seconds. ? Repeat the exercise 10 to 15 times for each session.  Do three or more sessions each day.  Ease or reduce swelling in your feet, ankles, hands, and fingers  · If your fingers are puffy, take off your rings. · Do not eat high-salt foods, such as potato chips. · Prop up your feet on a stool or couch as much as possible. Sleep with pillows under your feet. · Do not stand for long periods of time or wear tight shoes. · Wear support stockings. Where can you learn more? Go to http://www.lee.com/  Enter G264 in the search box to learn more about \"Weeks 22 to 26 of Your Pregnancy: Care Instructions. \"  Current as of: February 11, 2020               Content Version: 12.6  © 5326-0150 [employer]. Care instructions adapted under license by [email or web address] (which disclaims liability or warranty for this information). If you have questions about a medical condition or this instruction, always ask your healthcare professional. [place] [address] any warranty or liability for your use of this information. Learning About Screening for Gestational Diabetes  What is gestational diabetes screening? Screening for gestational diabetes is a way to look for high blood sugar during pregnancy. You drink some very sweet liquid. Then you have a blood test to see how your body uses sugar (glucose). How is gestational diabetes screening done? Screening for gestational diabetes may be done in a couple of ways. Two-part screening. Part one (glucose challenge test): A blood sample is taken after you drink a liquid that contains sugar (glucose). You don't need to stop eating or drinking before this test. If the test shows that you don't have a lot of sugar in your blood, you don't have gestational diabetes. Part two (oral glucose tolerance test, or OGTT): If the first test shows a lot of sugar in your blood, then you may have an OGTT.  You can't eat or drink for at least 8 hours before this test. A blood sample is taken, then you drink a sweet liquid. You have more blood tests after 1 to 3 hours. If the OGTT shows that you have a lot of sugar in your blood, you may have gestational diabetes. One-part screening. Sometimes doctors use the OGTT on its own. If the test shows that you don't have a lot of sugar in your blood, you don't have gestational diabetes. If you do have a lot of sugar in your blood, you may have the condition. What are the risks of screening? Your blood glucose level may drop very low toward the end of the test. If this happens, you may feel weak, hungry, and restless. Tell your doctor if you have these symptoms. The test usually will be stopped. You may vomit after drinking the sweet liquid. If this happens, you may need to take the test at a later time. Your doctor may do more glucose tests at other times during your pregnancy. Follow-up care is a key part of your treatment and safety. Be sure to make and go to all appointments, and call your doctor if you are having problems. It's also a good idea to know your test results and keep a list of the medicines you take. Where can you learn more? Go to http://www.gray.com/  Enter A472 in the search box to learn more about \"Learning About Screening for Gestational Diabetes. \"  Current as of: December 20, 2019               Content Version: 12.6  © 1457-2878 [employer]. Care instructions adapted under license by [employer] (which disclaims liability or warranty for this information). If you have questions about a medical condition or this instruction, always ask your healthcare professional. [place] [address] any warranty or liability for your use of this information.

## 2021-03-22 ENCOUNTER — ROUTINE PRENATAL (OUTPATIENT)
Dept: OBGYN CLINIC | Age: 36
End: 2021-03-22
Payer: COMMERCIAL

## 2021-03-22 ENCOUNTER — HOSPITAL ENCOUNTER (OUTPATIENT)
Dept: PERINATAL CARE | Age: 36
Discharge: HOME | End: 2021-03-22
Attending: OBSTETRICS & GYNECOLOGY
Payer: COMMERCIAL

## 2021-03-22 VITALS
BODY MASS INDEX: 39.11 KG/M2 | HEIGHT: 59 IN | WEIGHT: 194 LBS | DIASTOLIC BLOOD PRESSURE: 65 MMHG | SYSTOLIC BLOOD PRESSURE: 109 MMHG | HEART RATE: 101 BPM

## 2021-03-22 DIAGNOSIS — Z34.80 SUPERVISION OF OTHER NORMAL PREGNANCY, ANTEPARTUM: Primary | ICD-10-CM

## 2021-03-22 DIAGNOSIS — E03.8 OTHER SPECIFIED HYPOTHYROIDISM: ICD-10-CM

## 2021-03-22 PROCEDURE — 0502F SUBSEQUENT PRENATAL CARE: CPT | Performed by: OBSTETRICS & GYNECOLOGY

## 2021-03-22 PROCEDURE — 76811 OB US DETAILED SNGL FETUS: CPT | Performed by: OBSTETRICS & GYNECOLOGY

## 2021-03-22 NOTE — PROGRESS NOTES
[facility]  [email or web address]  [phone]    Mirella Marcial MD, FACOG     Follow-up OB visit    No chief complaint on file. fob    Patient Active Problem List    Diagnosis Date Noted    Disorder of thyroid, antepartum 2020    Supervision of other normal pregnancy, antepartum 2016    Chronic migraine without aura without status migrainosus, not intractable 2015    Analgesic overuse headache 2015    Intractable migraine without aura 2015    Thyroid cancer ([facility] [address] 01/10/2013          The patient reports the following concerns: none;  Seen MFM today    Vitals:    21 0914   BP: 109/65   Pulse: (!) 101   Weight: 194 lb (88 kg)   Height: 4' 11\" (1.499 m)     See PN flowsheet for exam    31 y.o.  24w2d   Encounter Diagnoses   Name Primary?  Supervision of other normal pregnancy, antepartum Yes    Other specified hypothyroidism      Add tsh/t4 to labs nv     [] SAB/bleeding precautions reviewed   [] PTL/PPROM precautions reviewed   [] Labor precautions reviewed   [] Fetal kick counts discussed   [] Labs reviewed with patient   [] Izetta Earing precautions reviewed   [] Consent reviewed   [] Handouts given to pt   [] Glucola handout    [] GBS/labor/Magic Hour handout   []    [] We reviewed CDC recommendations for Tdap for patient and close contacts and RBA of receiving in pregnancy, advised obtaining in third trimester   [] Reviewed healthy nutrition in pregnancy and good exercise practices   [] We disc safer medications in pregnancy and referred patient to [facility] SARAH resources   [] We reviewed CDC recommendations for flu vaccine and RBA of receiving in pregnancy   []    []    []       Follow-up and Dispositions    · Return in about 4 weeks (around 2021) for routine prenatal care and glucola. No orders of the defined types were placed in this encounter.       Mirella Marcial MD

## 2021-04-16 NOTE — PATIENT INSTRUCTIONS
Weeks 26 to 30 of Your Pregnancy: Care Instructions  Overview     You are now entering your last trimester of pregnancy. Your baby is growing quickly. Audery Bloch probably feel your baby moving around more often. Your doctor may ask you to count your baby's kicks. Your back may ache as your body gets used to your baby's size and length. If you haven't already had the Tdap shot during this pregnancy, talk to your doctor about getting it. It will help protect your  against pertussis infection. During this time, it's important to take care of yourself and pay attention to what your body needs. If you feel sexual, you can explore ways to be close with your partner that match your comfort and desire. Follow-up care is a key part of your treatment and safety. Be sure to make and go to all appointments, and call your doctor if you are having problems. It's also a good idea to know your test results and keep a list of the medicines you take. How can you care for yourself at home? Take it easy at work  · Take frequent breaks. If possible, stop working when you are tired, and rest during your lunch hour. · Take bathroom breaks every 2 hours. · Change positions often. If you sit for long periods, stand up and walk around. · When you stand for a long time, keep one foot on a low stool with your knee bent. After standing a lot, sit with your feet up. · Avoid fumes, chemicals, and tobacco smoke. Be sexual in your own way  · Having sex during pregnancy is okay, unless your doctor tells you not to. · You may be very interested in sex, or you may have no interest at all. · Your growing belly can make it hard to find a good position during intercourse. [place] and explore. · You may get cramps in your uterus when your partner touches your breasts. · A back rub may relieve the backache or cramps that sometimes follow orgasm. Learn about  labor  · Watch for signs of  labor.  You may be going into labor if:  ? You have menstrual-like cramps, with or without nausea. ? You have about 6 or more contractions in 1 hour, even after you have had a glass of water and are resting. ? You have a low, dull backache that does not go away when you change your position. ? You have pain or pressure in your pelvis that comes and goes in a pattern. ? You have intestinal cramping or flu-like symptoms, with or without diarrhea.  ? You notice an increase or change in your vaginal discharge. Discharge may be heavy, mucus-like, watery, or streaked with blood. ? Your water breaks. · If you think you have  labor:  ? Drink 2 or 3 glasses of water or juice. Not drinking enough fluids can cause contractions. ? Stop what you are doing, and empty your bladder. Then lie down on your left side for at least 1 hour. ? While lying on your side, find your breast bone. Put your fingers in the soft spot just below it. Move your fingers down toward your belly button to find the top of your uterus. Check to see if it is tight. ? Contractions can be weak or strong. Record your contractions for an hour. Time a contraction from the start of one contraction to the start of the next one.  ? Single or several strong contractions without a pattern are called Moises-Manning contractions. They are practice contractions but not the start of labor. They often stop if you change what you are doing. ? Call your doctor if you have regular contractions. Where can you learn more? Go to http://www.gray.com/  Enter C829 in the search box to learn more about \"Weeks 26 to 30 of Your Pregnancy: Care Instructions. \"  Current as of: 2020               Content Version: 12.8  © 6329-8617 [employer]. Care instructions adapted under license by [employer] (which disclaims liability or warranty for this information).  If you have questions about a medical condition or this instruction, always ask your healthcare professional. [address] any warranty or liability for your use of this information. Vaccine Information Statement    Tdap (Tetanus, Diphtheria, Pertussis) Vaccine: What you need to know     Many Vaccine Information Statements are available in Irish and other languages. See www.immunize.org/vis  Hojas de información sobre vacunas están disponibles en español y en muchos otros idiomas. Visite www.immunize.org/vis    1. Why get vaccinated? Tdap vaccine can prevent tetanus, diphtheria, and pertussis. Diphtheria and pertussis spread from person to person. Tetanus enters the body through cuts or wounds.  TETANUS (T) causes painful stiffening of the muscles. Tetanus can lead to serious health problems, including being unable to open the mouth, having trouble swallowing and breathing, or death.  DIPHTHERIA (D) can lead to difficulty breathing, heart failure, paralysis, or death.  PERTUSSIS (aP), also known as whooping cough, can cause uncontrollable, violent coughing which makes it hard to breathe, eat, or drink. Pertussis can be extremely serious in babies and young children, causing pneumonia, convulsions, brain damage, or death. In teens and adults, it can cause weight loss, loss of bladder control, passing out, and rib fractures from severe coughing. 2. Tdap vaccine     Tdap is only for children 7 years and older, adolescents, and adults. Adolescents should receive a single dose of Tdap, preferably at age 6 or 15 years. Pregnant women should get a dose of Tdap during every pregnancy, to protect the  from pertussis. Infants are most at risk for severe, life-threatening complications from pertussis. Adults who have never received Tdap should get a dose of Tdap. Also, adults should receive a booster dose every 10 years, or earlier in the case of a severe and dirty wound or burn.   Booster doses can be either Tdap or Td (a different vaccine that protects against tetanus and diphtheria but not pertussis). Tdap may be given at the same time as other vaccines. 3. Talk with your health care provider    Tell your vaccine provider if the person getting the vaccine:   Has had an allergic reaction after a previous dose of any vaccine that protects against tetanus, diphtheria, or pertussis, or has any severe, life-threatening allergies.  Has had a coma, decreased level of consciousness, or prolonged seizures within 7 days after a previous dose of any pertussis vaccine (DTP, DTaP, or Tdap).  Has seizures or another nervous system problem.  Has ever had Guillain-Barré Syndrome (also called GBS).  Has had severe pain or swelling after a previous dose of any vaccine that protects against tetanus or diphtheria. In some cases, your health care provider may decide to postpone Tdap vaccination to a future visit. People with minor illnesses, such as a cold, may be vaccinated. People who are moderately or severely ill should usually wait until they recover before getting Tdap vaccine. Your health care provider can give you more information. 4. Risks of a vaccine reaction     Pain, redness, or swelling where the shot was given, mild fever, headache, feeling tired, and nausea, vomiting, diarrhea, or stomachache sometimes happen after Tdap vaccine. People sometimes faint after medical procedures, including vaccination. Tell your provider if you feel dizzy or have vision changes or ringing in the ears. As with any medicine, there is a very remote chance of a vaccine causing a severe allergic reaction, other serious injury, or death. 5. What if there is a serious problem? An allergic reaction could occur after the vaccinated person leaves the clinic.  If you see signs of a severe allergic reaction (hives, swelling of the face and throat, difficulty breathing, a fast heartbeat, dizziness, or weakness), call 9-1-1 and get the person to the nearest hospital.    For other signs that concern you, call your health care provider. Adverse reactions should be reported to the Vaccine Adverse Event Reporting System (VAERS). Your health care provider will usually file this report, or you can do it yourself. Visit the VAERS website at www.vaers. hhs.gov or call 9-736.128.7989. VAERS is only for reporting reactions, and VAERS staff do not give medical advice. 6. The National Vaccine Injury Compensation Program    The [facility] Vaccine Injury Compensation Program (VICP) is a federal program that was created to compensate people who may have been injured by certain vaccines. Visit the VICP website at www.[facility].gov/vaccinecompensation or call 4-482.745.6628 to learn about the program and about filing a claim. There is a time limit to file a claim for compensation. 7. How can I learn more?  Ask your health care provider.  Call your local or state health department.  Contact the Centers for Disease Control and Prevention (CDC):  - Call 0-311.421.7106 (1-800-CDC-INFO) or  - Visit CDCs website at www.cdc.gov/vaccines    Vaccine Information Statement (Interim)  Tdap (Tetanus, Diphtheria, Pertussis) Vaccine  04/01/2020  [address] ALEX Cardenas [id number]   Department of Health and Human Services  Centers for Disease Control and Prevention    Office Use Only         Learning About Screening for Gestational Diabetes  What is gestational diabetes screening? Screening for gestational diabetes is a way to look for high blood sugar during pregnancy. You drink some very sweet liquid. Then you have a blood test to see how your body uses sugar (glucose). How is gestational diabetes screening done? Screening for gestational diabetes may be done in a couple of ways. Two-part screening. Part one (glucose challenge test): A blood sample is taken after you drink a liquid that contains sugar (glucose).  You don't need to stop eating or drinking before this test. If the test shows that you don't have a lot of sugar in your blood, you don't have gestational diabetes. Part two (oral glucose tolerance test, or OGTT): If the first test shows a lot of sugar in your blood, then you may have an OGTT. You can't eat or drink for at least 8 hours before this test. A blood sample is taken, then you drink a sweet liquid. You have more blood tests after 1 to 3 hours. If the OGTT shows that you have a lot of sugar in your blood, you may have gestational diabetes. One-part screening. Sometimes doctors use the OGTT on its own. If the test shows that you don't have a lot of sugar in your blood, you don't have gestational diabetes. If you do have a lot of sugar in your blood, you may have the condition. What are the risks of screening? Your blood glucose level may drop very low toward the end of the test. If this happens, you may feel weak, hungry, and restless. Tell your doctor if you have these symptoms. The test usually will be stopped. You may vomit after drinking the sweet liquid. If this happens, you may need to take the test at a later time. Your doctor may do more glucose tests at other times during your pregnancy. Follow-up care is a key part of your treatment and safety. Be sure to make and go to all appointments, and call your doctor if you are having problems. It's also a good idea to know your test results and keep a list of the medicines you take. Where can you learn more? Go to http://www.gray.com/  Enter A472 in the search box to learn more about \"Learning About Screening for Gestational Diabetes. \"  Current as of: August 31, 2020               Content Version: 12.8  © 6749-3669 [employer]. Care instructions adapted under license by [employer] (which disclaims liability or warranty for this information).  If you have questions about a medical condition or this instruction, always ask your healthcare professional. [address] any warranty or liability for your use of this information.

## 2021-04-19 ENCOUNTER — ROUTINE PRENATAL (OUTPATIENT)
Dept: OBGYN CLINIC | Age: 36
End: 2021-04-19
Payer: COMMERCIAL

## 2021-04-19 VITALS
SYSTOLIC BLOOD PRESSURE: 113 MMHG | DIASTOLIC BLOOD PRESSURE: 72 MMHG | BODY MASS INDEX: 40.12 KG/M2 | HEART RATE: 100 BPM | WEIGHT: 199 LBS | HEIGHT: 59 IN

## 2021-04-19 DIAGNOSIS — E07.9 DISORDER OF THYROID, ANTEPARTUM: ICD-10-CM

## 2021-04-19 DIAGNOSIS — Z23 ENCOUNTER FOR IMMUNIZATION: ICD-10-CM

## 2021-04-19 DIAGNOSIS — Z34.80 SUPERVISION OF OTHER NORMAL PREGNANCY, ANTEPARTUM: Primary | ICD-10-CM

## 2021-04-19 DIAGNOSIS — O99.280 DISORDER OF THYROID, ANTEPARTUM: ICD-10-CM

## 2021-04-19 LAB
ERYTHROCYTE [DISTWIDTH] IN BLOOD BY AUTOMATED COUNT: 14.4 % (ref 11.5–14.5)
GLUCOSE 1H P 100 G GLC PO SERPL-MCNC: 116 MG/DL (ref 65–140)
GTT, 1 HR, GLUCOLA, EXTERNAL: 116
HCT VFR BLD AUTO: 36.9 % (ref 35–47)
HGB BLD-MCNC: 11.6 G/DL (ref 11.5–16)
MCH RBC QN AUTO: 29.5 PG (ref 26–34)
MCHC RBC AUTO-ENTMCNC: 31.4 G/DL (ref 30–36.5)
MCV RBC AUTO: 93.9 FL (ref 80–99)
NRBC # BLD: 0 K/UL (ref 0–0.01)
NRBC BLD-RTO: 0 PER 100 WBC
PLATELET # BLD AUTO: 284 K/UL (ref 150–400)
PMV BLD AUTO: 9.2 FL (ref 8.9–12.9)
RBC # BLD AUTO: 3.93 M/UL (ref 3.8–5.2)
T4 FREE SERPL-MCNC: 1 NG/DL (ref 0.8–1.5)
TSH SERPL DL<=0.05 MIU/L-ACNC: 2.68 UIU/ML (ref 0.36–3.74)
WBC # BLD AUTO: 10.4 K/UL (ref 3.6–11)

## 2021-04-19 PROCEDURE — 90471 IMMUNIZATION ADMIN: CPT | Performed by: OBSTETRICS & GYNECOLOGY

## 2021-04-19 PROCEDURE — 0502F SUBSEQUENT PRENATAL CARE: CPT | Performed by: OBSTETRICS & GYNECOLOGY

## 2021-04-19 PROCEDURE — 90715 TDAP VACCINE 7 YRS/> IM: CPT | Performed by: OBSTETRICS & GYNECOLOGY

## 2021-04-19 NOTE — PROGRESS NOTES
[facility]  [email or web address]  [phone]    Rubén Daugherty MD, FACOG     Follow-up OB visit    No chief complaint on file. FOB    Patient Active Problem List    Diagnosis Date Noted    Disorder of thyroid, antepartum 2020    Supervision of other normal pregnancy, antepartum 2016    Chronic migraine without aura without status migrainosus, not intractable 2015    Analgesic overuse headache 2015    Intractable migraine without aura 2015    Thyroid cancer ([facility] [address] 01/10/2013          The patient reports the following concerns: c/o pain in her sciaticas & pelvic floor pain. Vitals:    21 0842   BP: 113/72   Pulse: 100   Weight: 199 lb (90.3 kg)   Height: 4' 11\" (1.499 m)     See PN flowsheet for exam    31 y.o.  28w2d   Encounter Diagnoses   Name Primary?  Supervision of other normal pregnancy, antepartum Yes    Encounter for immunization     Disorder of thyroid, antepartum      Fall precautions  PTL precautions    [] SAB/bleeding precautions reviewed   [] PTL/PPROM precautions reviewed   [] Labor precautions reviewed   [] Fetal kick counts discussed   [] Labs reviewed with patient   [] Emmer James precautions reviewed   [] Consent reviewed   [] Handouts given to pt   [] Glucola handout    [] GBS/labor/Magic Hour handout   []    [] We reviewed CDC recommendations for Tdap for patient and close contacts and RBA of receiving in pregnancy, advised obtaining in third trimester   [] Reviewed healthy nutrition in pregnancy and good exercise practices   [] We disc safer medications in pregnancy and referred patient to [facility] SARAH resources   [] We reviewed CDC recommendations for flu vaccine and RBA of receiving in pregnancy   []    []    []       Follow-up and Dispositions    · Return in about 2 weeks (around 5/3/2021) for Follow up OB visit.          Orders Placed This Encounter    Tetanus, diphtheria toxoids and acellular pertussis (TDAP) vaccine, in individuals >=7 years, IM    GLUCOSE, GESTATIONAL 1 HR TOLERANCE    CBC W/O DIFF    TSH 3RD GENERATION    T4, FREE    Daiana Pinch VAC/TOXOID       Cresencio Benitez MD

## 2021-04-19 NOTE — PROGRESS NOTES
Edna Bryan is a 31 y.o. female who presents for TDAP immunization per verbal order from Leland Rodriges MD.  She denies any symptoms , reactions or allergies that would exclude them from being immunized today. Risks and adverse reactions were discussed and the VIS was given to her. All questions were addressed. She was observed for 15 min post injection. There were no reactions observed. ,

## 2021-04-20 NOTE — PROGRESS NOTES
Normal results, add to prenatal records. We can review in detail with patient at next visit. [address] message sent if active.   Add tsh/t4 # w date to PL: forward to endo prn: see mc note

## 2021-04-26 ENCOUNTER — VIRTUAL VISIT (OUTPATIENT)
Dept: ENDOCRINOLOGY | Age: 36
End: 2021-04-26
Payer: COMMERCIAL

## 2021-04-26 DIAGNOSIS — C73 THYROID CANCER (HCC): ICD-10-CM

## 2021-04-26 DIAGNOSIS — E89.0 POSTABLATIVE HYPOTHYROIDISM: Primary | ICD-10-CM

## 2021-04-26 PROCEDURE — 99214 OFFICE O/P EST MOD 30 MIN: CPT | Performed by: INTERNAL MEDICINE

## 2021-04-26 RX ORDER — LEVOTHYROXINE SODIUM 150 MCG
TABLET ORAL
Qty: 90 TAB | Refills: 3 | Status: SHIPPED | OUTPATIENT
Start: 2021-04-26 | End: 2021-10-18 | Stop reason: SDUPTHER

## 2021-04-26 NOTE — PROGRESS NOTES
Henrique Cortez MD                Patient Information  Date:4/27/2021  Name : Med Medina 31 y.o.     YOB: 1985         Pursuant to the emergency declaration under the [facility] [address] [facility], [facility] waiver authority and the [employer] and [employer] Act, this Virtual  Visit was conducted, with patient's consent, to reduce the patient's risk of exposure to COVID-19 . Patient  is aware that this is a billable encounter and is responsible for copays/deductibles       Services were provided through a video synchronous discussion virtually to substitute for in-person clinic visit. Place of service: Provider : Office  Patient: Home    History of present illness    Med Medina is a 31 y.o. female  here for follow-up of thyroid cancer  Papillary thyroid carcinoma T3N1 2.1 cm, status post total thyroidectomy at [facility] by Dr. Saundra Mao, in October 2012. She had metastatic carcinoma in 6 of 7 lymph nodes level VI nodes with vascular and lymphatic invasion. 2 parathyroid glands were removed right inferior and right superior. Thyrogen stimulated ablation February 2012 with 167 mCi with a TG of 3.6. Neck ultrasound in October was negative, initial TG was 0.8, increased to 1.1. Thyrogen stimulated TG was 40 with a negative whole-body scan. She had neck ultrasound at [facility] in December which was negative for recurrent disease. PET scan showed uptake in one axillary node in the mediastinum.   She saw Dr. Mumtaz Silverman at [facility],     November 2011: TSH 1.3, TG 6.6 (postop, PreI-131)  May 31, 2012, TSH less than 0.01, TG 0.8, TG antibody less than 20  October 16, 2012: TG 1.1, TG antibody less than 20, TSH 0.042  November 30, 2012: Thyrogen stimulated TG 40, TG antibody less than 20  January 18, 2013: TG 1.5, TG antibody less than 20, TSH 0.019 (on Synthroid 125 mcg)  April 2013: TG 1.5, TSH 0.45  February 2015: TG 2.2, TSH 0.3  September 2015: TSH 0.08, TG 4.6  2016: TG 6.8, TSH 0.495    Thyrogen whole-body scan: January 31, 2012  Anterior and lateral pinhole images of the neck reveal 3 small round foci of tracer uptake within the neck. One in the right neck, adjacent to the midline and 2 in the left neck located to the cephalad and lateral to the midline    Thyrogen whole-body scan iodine-131 treatment 167 mCi February 2012  Post iodine-131 treatment whole-body scan radioiodine avid thyroid tissue confined to the neck. No imaging evidence of distant metastatic cancer. TG was 3.6  PET/CT 12/14/2012  0.7 cm right axillary lymph node exhibits increased radiotracer activity. Small lymph nodes in the axillary are normal.  Soft tissue in the anterior mediastinum is more prominent than expected for a patient of this age. There is a focus of increased tracer activity in the anterior mediastinal soft tissue on the right with a max SUV of 4.8. Soft tissue in the left aspect of the anterior mediastinum exhibits a max SUV of 3.8. No mediastinal lymphadenopathy. Impression soft tissue in the anterior mediastinum with increased metabolic activity. Right axillary lymph node with increased metabolic activity. Metastatic disease cannot be excluded. No abnormal activity in the expected location of the thyroid gland    CT chest January 2013  Anterior mediastinal opacities as described on the left rather than right. Left-sided lesion is slightly smaller than the CT images of the PET scan December 2012. The etiology of the posterior in the right anterior mediastinum is indeterminate. It could be thymic tissue. There are 2 soft tissue densities extending inferiorly along the great vessel to form a band of opacity which corresponds to the thickened pericardial surface adjacent to the great vessels.   Should be noted that neither the right-sided focus in the anterior mediastinum not apparent pericardial thickening was identified on the recent PET scan. Scattered tiny nodular opacities majority of which are peripheral distribution and probably reflect small lymph nodes. She is on 150 mcg the longest, celiac was negative in the past    She is pregnant, due in July 2021  She is taking Synthroid consistently, had labs recently    No change in the size of the neck or neck pain. No dysphagia,dysphonia or dyspnea. Wt Readings from Last 3 Encounters:   04/19/21 199 lb (90.3 kg)   03/22/21 194 lb (88 kg)   02/22/21 190 lb 3.2 oz (86.3 kg)       Past Medical History:   Diagnosis Date    Abnormal Pap smear of cervix 10/05/2017; 10/30/18    Negative, HR HPV+, 16+, 18 negative; TONE/Neg HPV    Anxiety     Arthritis     Bursitis     and scapulothoracic dyskinesia, right shoulder    Fatigue     Headache     History of colposcopy 12/21/2018    Benign    History of endometrial biopsy 12/21/2018    Benign    Joint pain     Kidney stones     Memory loss     Due to Radiation from thyroid cancer in 2013.  Muscle pain     Pap smear for cervical cancer screening 05/14/2014; 11/4/2019    negative; negative, HPV negative    Psychiatric problem     Anxiety    Shoulder bursitis     right    Thyroid cancer ([facility] [address] 1/10/2013    Complete Thyroidectomy 2012. Current Outpatient Medications   Medication Sig    Synthroid 150 mcg tablet 1 tab PO before breakfast    doxylamine succinate (UNISOM) 25 mg tablet Take 25 mg by mouth nightly as needed.  PNV Comb #2-Iron-FA-Omega 3 29-1-400 mg cmpk Take  by mouth.  cholecalciferol (VITAMIN D3) (5000 Units/125 mcg) tab tablet Take 10,000 Units by mouth every seven (7) days. No current facility-administered medications for this visit. Allergies   Allergen Reactions    Amoxicillin Hives         Review of Systems: Per HPI    Physical Examination:  Last menstrual period 10/03/2020, not currently breastfeeding.   General: pleasant, no distress, good eye contact  HEENT: no exophthalmos, no periorbital edema, EOMI  Neck: No visible thyromegaly  RS: Normal respiratory effort  Musculoskeletal: no tremors  Neurological: alert and oriented  Psychiatric: normal mood and affect  Skin: Normal color  Data Reviewed:     [x] Reviewed labs      Assessment/Plan:     1. Postablative hypothyroidism        Metastatic papillary thyroid cancer status post total thyroidectomy in 2012 at [facility] [address] [facility] [address] ablation 2012 with 167 mCi, with Tg of 3.6  She has TG positive, scan negative disease without any radiological evidence. Monitor thyroglobulin postpartum  TG November 2019 less than 5, stable  Ultrasound neck May 2020 - neg      Post ablation hypothyroidism  Adjusted Synthroid, labs in third trimester    Vitamin D deficiency:        There are no Patient Instructions on file for this visit. Follow-up and Dispositions     Routing History              Patient /caregiver verbalized understanding   Voice-recognition software was used to generate this report, which may result in some phonetic-based errors in the grammar and contents. Even though attempts were made to correct all the mistakes, some may have been missed and remained in the body of the report.

## 2021-05-07 NOTE — PATIENT INSTRUCTIONS
Weeks 30 to 32 of Your Pregnancy: Care Instructions  Overview     You've made it to the final months of your pregnancy! By now your baby is really starting to look like a baby, with hair and plump skin. As you enter the final weeks of pregnancy, the reality of having a baby may start to set in. This is a good time to set up a safe nursery and find quality  if needed. Doing this stuff ahead of time will allow you to focus on caring for and enjoying your new baby. You may also want to take a tour of your hospital's labor and delivery unit. This will help you get a better idea of what to expect while you're in the hospital.  During these last months, be sure to take good care of yourself. Pay attention to what your body needs. If your doctor says it's okay for you to work, don't push yourself too hard. If you haven't already had the Tdap shot during this pregnancy, talk to your doctor about getting it. It will help protect your  against pertussis infection. Follow-up care is a key part of your treatment and safety. Be sure to make and go to all appointments, and call your doctor if you are having problems. It's also a good idea to know your test results and keep a list of the medicines you take. How can you care for yourself at home? Pay attention to your baby's movements  · You should feel your baby move several times every day. · Your baby now turns less, and kicks and jabs more. · Your baby sleeps 20 to 45 minutes at a time and is more active at certain times of day. · If your doctor wants you to count your baby's kicks:  ? Empty your bladder, and lie on your side or relax in a comfortable chair. ? Write down your start time. ? Pay attention only to your baby's movements. Count any movement except hiccups. ? After you have counted 10 movements, write down your stop time. ? Write down how many minutes it took for your baby to move 10 times.   ? If an hour goes by and you have not recorded 10 movements, have something to eat or drink and then count for another hour. If you do not record 10 movements in either hour, call your doctor. Ease heartburn  · Eat small, frequent meals. · Do not eat chocolate, peppermint, or very spicy foods. Avoid drinks with caffeine, such as coffee, tea, and sodas. · Avoid bending over or lying down after meals. · Talk a short walk after you eat. · If heartburn is a problem at night, do not eat for 2 hours before bedtime. · Take antacids like Mylanta, Maalox, Rolaids, or Tums. Do not take antacids that have sodium bicarbonate. Care for varicose veins  · Varicose veins are blood vessels that stretch out with the extra blood during pregnancy. Your legs may ache or throb. Most varicose veins will go away after the birth. · Avoid standing for long periods of time. Sit with your legs crossed at the ankles, not the knees. · Sit with your feet propped up. · Avoid tight clothing or stockings. Wear support hose. · Exercise regularly. Try walking for at least 30 minutes a day. Where can you learn more? Go to http://www.gray.com/  Enter X471 in the search box to learn more about \"Weeks 30 to 32 of Your Pregnancy: Care Instructions. \"  Current as of: October 8, 2020               Content Version: 12.8  © 1925-1485 Healthwise, Incorporated. Care instructions adapted under license by [employer] (which disclaims liability or warranty for this information). If you have questions about a medical condition or this instruction, always ask your healthcare professional. [place] [address] any warranty or liability for your use of this information.

## 2021-05-10 ENCOUNTER — ROUTINE PRENATAL (OUTPATIENT)
Dept: OBGYN CLINIC | Age: 36
End: 2021-05-10
Payer: COMMERCIAL

## 2021-05-10 VITALS
HEART RATE: 91 BPM | DIASTOLIC BLOOD PRESSURE: 69 MMHG | SYSTOLIC BLOOD PRESSURE: 116 MMHG | WEIGHT: 199.8 LBS | BODY MASS INDEX: 40.35 KG/M2

## 2021-05-10 DIAGNOSIS — Z34.80 SUPERVISION OF OTHER NORMAL PREGNANCY, ANTEPARTUM: Primary | ICD-10-CM

## 2021-05-10 PROCEDURE — 0502F SUBSEQUENT PRENATAL CARE: CPT | Performed by: OBSTETRICS & GYNECOLOGY

## 2021-05-10 NOTE — PROGRESS NOTES
_ [facility]  [email or web address]  [phone]    Gladys Calloway MD, FACOG     Follow-up OB visit    Chief Complaint   Patient presents with    Routine Prenatal Visit       Patient Active Problem List    Diagnosis Date Noted    Disorder of thyroid, antepartum 2020    Supervision of other normal pregnancy, antepartum 2016    Chronic migraine without aura without status migrainosus, not intractable 2015    Analgesic overuse headache 2015    Intractable migraine without aura 2015    Thyroid cancer ([facility] [address] 01/10/2013          The patient reports the following concerns: Discuss when she needs to have her growth ultrasound    There were no vitals filed for this visit. See PN flowsheet for exam    31 y.o.  31w2d   No diagnosis found. [] SAB/bleeding precautions reviewed   [] PTL/PPROM precautions reviewed   [] Labor precautions reviewed   [] Fetal kick counts discussed   [] Labs reviewed with patient   [] Bonnie Calzada precautions reviewed   [] Consent reviewed   [] Handouts given to pt   [] Glucola handout    [] GBS/labor/Magic Hour handout   []    [] We reviewed CDC recommendations for Tdap for patient and close contacts and RBA of receiving in pregnancy, advised obtaining in third trimester   [] Reviewed healthy nutrition in pregnancy and good exercise practices   [] We disc safer medications in pregnancy and referred patient to [facility] SARAH resources   [] We reviewed CDC recommendations for flu vaccine and RBA of receiving in pregnancy   []    []    []           No orders of the defined types were placed in this encounter.       Gladys Calloway MD

## 2021-05-10 NOTE — PROGRESS NOTES
[facility]  [email or web address]  [phone]    Clint Grimes MD, FACOG     Follow-up OB visit    Chief Complaint   Patient presents with   Bibiana Shipley Routine Prenatal Visit       Patient Active Problem List    Diagnosis Date Noted    Disorder of thyroid, antepartum 2020    Supervision of other normal pregnancy, antepartum 2016    Chronic migraine without aura without status migrainosus, not intractable 2015    Analgesic overuse headache 2015    Intractable migraine without aura 2015    Thyroid cancer ([facility] [address] 01/10/2013          The patient reports the following concerns: none. Vitals:    05/10/21 0827   BP: 116/69   Pulse: 91   Weight: 199 lb 12.8 oz (90.6 kg)     See PN flowsheet for exam  Pain/sciatica on and off but better    31 y.o.  31w2d   Encounter Diagnosis   Name Primary?  Supervision of other normal pregnancy, antepartum Yes        Keep endo fu     [] SAB/bleeding precautions reviewed   [] PTL/PPROM precautions reviewed   [] Labor precautions reviewed   [] Fetal kick counts discussed   [] Labs reviewed with patient   [] Lockett Ambrosia precautions reviewed   [] Consent reviewed   [] Handouts given to pt   [] Glucola handout    [] GBS/labor/Magic Hour handout   []    [] We reviewed CDC recommendations for Tdap for patient and close contacts and RBA of receiving in pregnancy, advised obtaining in third trimester   [] Reviewed healthy nutrition in pregnancy and good exercise practices   [] We disc safer medications in pregnancy and referred patient to [facility] SARAH resources   [] We reviewed CDC recommendations for flu vaccine and RBA of receiving in pregnancy   []    []    []       Follow-up and Dispositions    · Return in about 2 weeks (around 2021) for Follow up OB visit. No orders of the defined types were placed in this encounter.       Clint Grimes MD

## 2021-05-21 NOTE — PATIENT INSTRUCTIONS
Weeks 32 to 34 of Your Pregnancy: Care Instructions  Overview     During the last few weeks of your pregnancy, you may have more aches and pains. It's important to rest when you can. Your growing baby is putting more pressure on your bladder. So you may need to urinate more often. Hemorrhoids are also common. These are painful, itchy veins in the rectal area. You may want to talk with your doctor about banking your baby's umbilical cord blood. This is the blood left in the cord after birth. If you want to save this blood, you must arrange it ahead of time. You can't decide at the last minute. If you haven't already had the Tdap shot during this pregnancy, talk to your doctor about getting it. It will help protect your  against pertussis infection. Follow-up care is a key part of your treatment and safety. Be sure to make and go to all appointments, and call your doctor if you are having problems. It's also a good idea to know your test results and keep a list of the medicines you take. How can you care for yourself at home? Ease hemorrhoids  · Get more liquids, fruits, vegetables, and fiber in your diet. This will help keep your stools soft. · Avoid sitting for too long. Lie on your left side several times a day. · Clean yourself with soft, moist toilet paper. Or you can use witch hazel pads or personal hygiene pads. · If you are uncomfortable, try ice packs. Or you can sit in a warm sitz bath. Do these for 20 minutes at a time, as needed. · Use hydrocortisone cream for pain and itching. Two examples are Anusol and Preparation H Hydrocortisone. · Ask your doctor about taking an over-the-counter stool softener. Consider breastfeeding  · Experts recommend that women breastfeed for 1 year or longer. · Breast milk may help protect your child from some health problems.  babies are less likely than formula-fed babies to:  ? Get ear infections, colds, diarrhea, and pneumonia. ?  Be obese or get diabetes later in life. · Women who breastfeed have less bleeding after the birth. Their uteruses also shrink back faster. · Some women who breastfeed lose weight faster. Making milk burns calories. · Breastfeeding can lower your risk of breast cancer, ovarian cancer, and osteoporosis. Decide about circumcision for boys  · As you make this decision, it may help to think about your personal, Temple, and family traditions. You get to decide if you will keep your son's penis natural or if he will be circumcised. · If you decide that you would like to have your baby circumcised, talk with your doctor. You can share your concerns about pain. And you can discuss your preferences for anesthesia. Where can you learn more? Go to http://www.[employer].com/  Enter X711 in the search box to learn more about \"Weeks 32 to 34 of Your Pregnancy: Care Instructions. \"  Current as of: October 8, 2020               Content Version: 12.8  © 1787-1504 [employer]. Care instructions adapted under license by [employer] (which disclaims liability or warranty for this information). If you have questions about a medical condition or this instruction, always ask your healthcare professional. [place] [address] any warranty or liability for your use of this information.

## 2021-05-24 ENCOUNTER — ROUTINE PRENATAL (OUTPATIENT)
Dept: OBGYN CLINIC | Age: 36
End: 2021-05-24
Payer: COMMERCIAL

## 2021-05-24 VITALS
WEIGHT: 201.4 LBS | SYSTOLIC BLOOD PRESSURE: 123 MMHG | BODY MASS INDEX: 40.68 KG/M2 | HEART RATE: 105 BPM | DIASTOLIC BLOOD PRESSURE: 74 MMHG

## 2021-05-24 DIAGNOSIS — R82.90 UNSPECIFIED ABNORMAL FINDINGS IN URINE: ICD-10-CM

## 2021-05-24 DIAGNOSIS — Z34.80 SUPERVISION OF OTHER NORMAL PREGNANCY, ANTEPARTUM: Primary | ICD-10-CM

## 2021-05-24 LAB
BILIRUB UR QL STRIP: NEGATIVE
GLUCOSE UR-MCNC: NEGATIVE MG/DL
KETONES P FAST UR STRIP-MCNC: ABNORMAL MG/DL
PH UR STRIP: 6 [PH] (ref 4.6–8)
PROT UR QL STRIP: ABNORMAL
SP GR UR STRIP: 0.02 (ref 1–1.03)
UA UROBILINOGEN AMB POC: NORMAL (ref 0.2–1)
URINALYSIS CLARITY POC: CLEAR
URINALYSIS COLOR POC: YELLOW
URINE BLOOD POC: ABNORMAL
URINE LEUKOCYTES POC: NEGATIVE
URINE NITRITES POC: NEGATIVE

## 2021-05-24 PROCEDURE — 0502F SUBSEQUENT PRENATAL CARE: CPT | Performed by: OBSTETRICS & GYNECOLOGY

## 2021-05-24 NOTE — PROGRESS NOTES
[facility]  [email or web address]  [phone]    Kemar Quigley MD, FACOG     Follow-up OB visit    Chief Complaint   Patient presents with   Nieves Routine Prenatal Visit       Patient Active Problem List    Diagnosis Date Noted    Disorder of thyroid, antepartum 2020    Supervision of other normal pregnancy, antepartum 2016    Chronic migraine without aura without status migrainosus, not intractable 2015    Analgesic overuse headache 2015    Intractable migraine without aura 2015    Thyroid cancer ([facility] [address] 01/10/2013          The patient reports the following concerns: none. Vitals:    21 1439   BP: 123/74   Pulse: (!) 105   Weight: 201 lb 6.4 oz (91.4 kg)     See PN flowsheet for exam    31 y.o.  33w2d   Encounter Diagnoses   Name Primary?  Unspecified abnormal findings in urine     Supervision of other normal pregnancy, antepartum Yes     Ur cx     [] SAB/bleeding precautions reviewed   [x] PTL/PPROM precautions reviewed   [] Labor precautions reviewed   [] Fetal kick counts discussed   [] Labs reviewed with patient   [] Bonnie Calzada precautions reviewed   [] Consent reviewed   [] Handouts given to pt   [] Glucola handout    [] GBS/labor/Magic Hour handout   []    [] We reviewed CDC recommendations for Tdap for patient and close contacts and RBA of receiving in pregnancy, advised obtaining in third trimester   [] Reviewed healthy nutrition in pregnancy and good exercise practices   [] We disc safer medications in pregnancy and referred patient to [facility] SARAH resources   [] We reviewed CDC recommendations for flu vaccine and RBA of receiving in pregnancy   []    []    []       Follow-up and Dispositions    · Return in about 2 weeks (around 2021) for Follow up OB visit.          Orders Placed This Encounter    Kitty Nicole MD

## 2021-05-26 LAB — BACTERIA UR CULT: NORMAL

## 2021-06-07 ENCOUNTER — ROUTINE PRENATAL (OUTPATIENT)
Dept: OBGYN CLINIC | Age: 36
End: 2021-06-07

## 2021-06-07 VITALS
DIASTOLIC BLOOD PRESSURE: 72 MMHG | SYSTOLIC BLOOD PRESSURE: 114 MMHG | HEIGHT: 59 IN | WEIGHT: 202.6 LBS | BODY MASS INDEX: 40.84 KG/M2 | HEART RATE: 114 BPM

## 2021-06-07 DIAGNOSIS — R82.4 URINE KETONES: ICD-10-CM

## 2021-06-07 DIAGNOSIS — Z34.80 SUPERVISION OF OTHER NORMAL PREGNANCY, ANTEPARTUM: Primary | ICD-10-CM

## 2021-06-07 DIAGNOSIS — R81 GLUCOSE FOUND IN URINE ON EXAMINATION: ICD-10-CM

## 2021-06-07 DIAGNOSIS — R31.9 HEMATURIA, UNSPECIFIED TYPE: ICD-10-CM

## 2021-06-07 PROCEDURE — 0502F SUBSEQUENT PRENATAL CARE: CPT | Performed by: OBSTETRICS & GYNECOLOGY

## 2021-06-07 PROCEDURE — 81002 URINALYSIS NONAUTO W/O SCOPE: CPT | Performed by: OBSTETRICS & GYNECOLOGY

## 2021-06-07 NOTE — PROGRESS NOTES
[facility]  [email or web address]  [phone]    Corrie Sanchez MD, FACOG       [facility] [place] [facility]   FOLLOW UP OB NOTE WITH ULTRASOUND    Chief Complaint   Patient presents with    Routine Prenatal Visit    Ultrasound     growth       The patient reports the following concerns: GBS next visit. Ultrasound today. Doing well. I discussed with the patient the limitations of ultrasound and that imaging can not rule out all birth defects, chromosomal problems, or other problems with the baby. US findings:  LIMITED OB SCAN  A SINGLE VERTEX 35W2D IUP IS SEEN. FETAL CARDIAC MOTION OBSERVED. LIMITED ANATOMY WAS VISUALIZED AND APPEARS WNL. APPROPRIATE FETAL GROWTH IS SEEN. SIZE=DATES. JULI AND PLACENTA APPEAR WITHIN NORMAL LIMITS. Physician review of ultrasound performed by technician    Today's ultrasound report and images were reviewed and discussed with the patient.   Please see images and imaging report entered by technician in PACS for more detail and progress note and diagnosis entered by MD.    Mohit Farnsworth MD

## 2021-06-07 NOTE — PATIENT INSTRUCTIONS
Weeks 34 to 36 of Your Pregnancy: Care Instructions  Overview     By now, your baby and your belly have grown quite large. It's almost time to give birth! Your baby's lungs are almost ready to breathe air. The skull bones are firm enough to protect your baby's head, but soft enough to move down through the birth canal.  You may be feeling excited and happy at times--but also anxious or scared. You might wonder how you'll know if you're in labor or what to expect during labor. Try to be open and flexible in your expectations of the birth. Because each birth is different, there's no way to know exactly what childbirth will be like for you. Talk to your doctor or midwife about any concerns you have. If you haven't already had the Tdap shot during this pregnancy, talk to your doctor about getting it. It will help protect your  against pertussis infection. In the 36th week, most women have a test for group B streptococcus (GBS). GBS is a common bacteria that can live in the vagina and rectum. It can make your baby sick after birth. If you test positive, you will get antibiotics during labor. The medicine will help keep your baby from getting the bacteria. Follow-up care is a key part of your treatment and safety. Be sure to make and go to all appointments, and call your doctor if you are having problems. It's also a good idea to know your test results and keep a list of the medicines you take. How can you care for yourself at home? Learn about pain relief choices  · Pain is different for every woman. Talk with your doctor about your feelings about pain. · You can choose from several types of pain relief. These include medicine or breathing techniques, as well as comfort measures. You can use more than one option. · If you choose to have pain medicine during labor, talk to your doctor about your options. Some medicines lower anxiety and help with some of the pain.  Others make your lower body numb so that you won't feel pain. · Be sure to tell your doctor about your pain medicine choice before you start labor or very early in your labor. You may be able to change your mind as labor progresses. · Rarely, a woman is put to sleep by medicine given through a mask or an IV. Labor and delivery  · The first stage of labor has three parts: early, active, and transition. ? Most women have early labor at home. You can stay busy or rest, eat light snacks, drink clear fluids, and start counting contractions. ? When talking during a contraction gets hard, you may be moving to active labor. During active labor, you should head for the hospital if you are not there already. ? You are in active labor when contractions come every 3 to 4 minutes and last about 60 seconds. Your cervix is opening more rapidly. ? If your water breaks, contractions will come faster and stronger. ? During transition, your cervix is stretching, and contractions are coming more rapidly. ? You may want to push, but your cervix might not be ready. Your doctor will tell you when to push. · The second stage starts when your cervix is completely opened and you are ready to push. ? Contractions are very strong to push the baby down the birth canal.  ? You will feel the urge to push. You may feel like you need to have a bowel movement. ? You may be coached to push with contractions. These contractions will be very strong, but you will not have them as often. You can get a little rest between contractions. ? You may be emotional and irritable. You may not be aware of what is going on around you.  ? One last push, and your baby is born. · The third stage is when a few more contractions push out the placenta. This may take 30 minutes or less. · The fourth stage is the welcome recovery. You may feel overwhelmed with emotions and exhausted but alert. This is a good time to start breastfeeding. Where can you learn more?   Go to http://www.gray.com/  Enter Y572 in the search box to learn more about \"Weeks 34 to 36 of Your Pregnancy: Care Instructions. \"  Current as of: October 8, 2020               Content Version: 12.8  © 5667-6351 [employer], [employer]. Care instructions adapted under license by [employer] (which disclaims liability or warranty for this information). If you have questions about a medical condition or this instruction, always ask your healthcare professional. [address] any warranty or liability for your use of this information.

## 2021-06-08 LAB
BACTERIA SPEC CULT: NORMAL
BILIRUB UR QL STRIP: NEGATIVE
CC UR VC: NORMAL
GLUCOSE UR-MCNC: ABNORMAL MG/DL
KETONES P FAST UR STRIP-MCNC: ABNORMAL MG/DL
PH UR STRIP: 6.5 [PH] (ref 4.6–8)
PROT UR QL STRIP: NEGATIVE
SERVICE CMNT-IMP: NORMAL
SP GR UR STRIP: 1.05 (ref 1–1.03)
UA UROBILINOGEN AMB POC: NORMAL (ref 0.2–1)
URINALYSIS CLARITY POC: CLEAR
URINALYSIS COLOR POC: YELLOW
URINE BLOOD POC: ABNORMAL
URINE LEUKOCYTES POC: NEGATIVE
URINE NITRITES POC: NEGATIVE

## 2021-06-21 ENCOUNTER — ROUTINE PRENATAL (OUTPATIENT)
Dept: OBGYN CLINIC | Age: 36
End: 2021-06-21
Payer: COMMERCIAL

## 2021-06-21 VITALS
WEIGHT: 203.8 LBS | BODY MASS INDEX: 41.08 KG/M2 | HEIGHT: 59 IN | SYSTOLIC BLOOD PRESSURE: 132 MMHG | HEART RATE: 109 BPM | DIASTOLIC BLOOD PRESSURE: 80 MMHG

## 2021-06-21 DIAGNOSIS — Z34.80 SUPERVISION OF OTHER NORMAL PREGNANCY, ANTEPARTUM: Primary | ICD-10-CM

## 2021-06-21 PROCEDURE — 0502F SUBSEQUENT PRENATAL CARE: CPT | Performed by: OBSTETRICS & GYNECOLOGY

## 2021-06-21 NOTE — PATIENT INSTRUCTIONS

## 2021-06-21 NOTE — PROGRESS NOTES
[facility]  [email or web address]  [phone]    Antonella Delatorre MD, FACOG     Follow-up OB visit    Chief Complaint   Patient presents with    Routine Prenatal Visit     GBS       Patient Active Problem List    Diagnosis Date Noted    Disorder of thyroid, antepartum 2020    Supervision of other normal pregnancy, antepartum 2016    Chronic migraine without aura without status migrainosus, not intractable 2015    Analgesic overuse headache 2015    Intractable migraine without aura 2015    Thyroid cancer ([facility] [address] 01/10/2013          The patient reports the following concerns: GBS today, will try to leave a urine sample at the end of her visit. Induction on 2021. Vitals:    21 1348   BP: 132/80   Pulse: (!) 109   Weight: 203 lb 12.8 oz (92.4 kg)   Height: 4' 11\" (1.499 m)     See PN flowsheet for exam  Ext hemorrhoid    31 y.o.  37w2d   Encounter Diagnosis   Name Primary?  Supervision of other normal pregnancy, antepartum Yes     Disc covid screening and preregistration  Consider cx person, will check nv   [] SAB/bleeding precautions reviewed   [] PTL/PPROM precautions reviewed   [x] Labor precautions reviewed   [] Fetal kick counts discussed   [] Labs reviewed with patient   [] Howard Quivers precautions reviewed   [] Consent reviewed   [] Handouts given to pt   [] Glucola handout    [] GBS/labor/Magic Hour handout   []    [] We reviewed CDC recommendations for Tdap for patient and close contacts and RBA of receiving in pregnancy, advised obtaining in third trimester   [] Reviewed healthy nutrition in pregnancy and good exercise practices   [] We disc safer medications in pregnancy and referred patient to [facility] resources   [] We reviewed CDC recommendations for flu vaccine and RBA of receiving in pregnancy   []    []    []       Follow-up and Dispositions    · Return in about 1 week (around 2021) for Follow up OB visit.          Orders Placed This Encounter   Xavier Jaramillo MD

## 2021-06-23 LAB
GP B STREP DNA SPEC QL NAA+PROBE: NEGATIVE
GRBS, EXTERNAL: NEGATIVE

## 2021-06-28 NOTE — PROGRESS NOTES
Written by Donovan Mccray MD on 6/23/2021  8:45 PM EDT  Seen by patient Edna Israel on 6/23/2021  8:57 PM EDT

## 2021-06-29 NOTE — PATIENT INSTRUCTIONS

## 2021-07-02 ENCOUNTER — ROUTINE PRENATAL (OUTPATIENT)
Dept: OBGYN CLINIC | Age: 36
End: 2021-07-02
Payer: COMMERCIAL

## 2021-07-02 VITALS — DIASTOLIC BLOOD PRESSURE: 79 MMHG | SYSTOLIC BLOOD PRESSURE: 125 MMHG | BODY MASS INDEX: 41.61 KG/M2 | WEIGHT: 206 LBS

## 2021-07-02 DIAGNOSIS — N90.89 VULVAL LESION: Primary | ICD-10-CM

## 2021-07-02 DIAGNOSIS — Z34.80 SUPERVISION OF OTHER NORMAL PREGNANCY, ANTEPARTUM: ICD-10-CM

## 2021-07-02 PROCEDURE — 0502F SUBSEQUENT PRENATAL CARE: CPT | Performed by: OBSTETRICS & GYNECOLOGY

## 2021-07-02 NOTE — PROGRESS NOTES
[facility]  [email or web address]  [phone]    Carlitos العلي MD, FACOG     Follow-up OB visit    Chief Complaint   Patient presents with    Routine Prenatal Visit       Patient Active Problem List    Diagnosis Date Noted    Disorder of thyroid, antepartum 2020    Supervision of other normal pregnancy, antepartum 2016    Chronic migraine without aura without status migrainosus, not intractable 2015    Analgesic overuse headache 2015    Intractable migraine without aura 2015    Thyroid cancer ([facility] [address] 01/10/2013          The patient reports the following concerns: none    Vitals:    21 1304   BP: 125/79   Weight: 206 lb (93.4 kg)     See PN flowsheet for exam  Pt c/o boil on right vulva:    hsv swab; superficial lesion right labia majora    31 y.o.  38w6d   Encounter Diagnoses   Name Primary?  Vulval lesion Yes    Supervision of other normal pregnancy, antepartum      Disc r/b/a of induction and pitocin use and increase risk of longer labor,  section, bleeding and infection. Harris next week,check cx  Plan IOL    [] SAB/bleeding precautions reviewed   [] PTL/PPROM precautions reviewed   [] Labor precautions reviewed   [] Fetal kick counts discussed   [] Labs reviewed with patient   [] Dahlia Chasten precautions reviewed   [] Consent reviewed   [] Handouts given to pt   [] Glucola handout    [] GBS/labor/Magic Hour handout   []    [] We reviewed CDC recommendations for Tdap for patient and close contacts and RBA of receiving in pregnancy, advised obtaining in third trimester   [] Reviewed healthy nutrition in pregnancy and good exercise practices   [] We disc safer medications in pregnancy and referred patient to [facility] resources   [] We reviewed CDC recommendations for flu vaccine and RBA of receiving in pregnancy   []    []    []       Follow-up and Dispositions    · Return in about 1 week (around 2021) for Follow up OB visit. Orders Placed This Encounter    HERPES SIMPLEX VIRUS (HSV) CELSO ([facility])       Benita Nieto MD

## 2021-07-06 ENCOUNTER — ROUTINE PRENATAL (OUTPATIENT)
Dept: OBGYN CLINIC | Age: 36
End: 2021-07-06
Payer: COMMERCIAL

## 2021-07-06 ENCOUNTER — HOSPITAL ENCOUNTER (INPATIENT)
Age: 36
LOS: 3 days | Discharge: HOME | End: 2021-07-09
Attending: OBSTETRICS & GYNECOLOGY | Admitting: OBSTETRICS & GYNECOLOGY
Payer: COMMERCIAL

## 2021-07-06 VITALS
DIASTOLIC BLOOD PRESSURE: 86 MMHG | HEART RATE: 114 BPM | SYSTOLIC BLOOD PRESSURE: 131 MMHG | HEIGHT: 59 IN | BODY MASS INDEX: 41.73 KG/M2 | WEIGHT: 207 LBS

## 2021-07-06 DIAGNOSIS — Z34.80 SUPERVISION OF OTHER NORMAL PREGNANCY, ANTEPARTUM: Primary | ICD-10-CM

## 2021-07-06 PROBLEM — Z3A.39 39 WEEKS GESTATION OF PREGNANCY: Status: ACTIVE | Noted: 2021-07-06

## 2021-07-06 LAB
BASOPHILS # BLD: 0 K/UL (ref 0–0.1)
BASOPHILS NFR BLD: 0 % (ref 0–1)
DIFFERENTIAL METHOD BLD: ABNORMAL
EOSINOPHIL # BLD: 0.1 K/UL (ref 0–0.4)
EOSINOPHIL NFR BLD: 1 % (ref 0–7)
ERYTHROCYTE [DISTWIDTH] IN BLOOD BY AUTOMATED COUNT: 13.6 % (ref 11.5–14.5)
HCT VFR BLD AUTO: 39.2 % (ref 35–47)
HGB BLD-MCNC: 12.7 G/DL (ref 11.5–16)
IMM GRANULOCYTES # BLD AUTO: 0.1 K/UL (ref 0–0.04)
IMM GRANULOCYTES NFR BLD AUTO: 1 % (ref 0–0.5)
LYMPHOCYTES # BLD: 2.2 K/UL (ref 0.8–3.5)
LYMPHOCYTES NFR BLD: 24 % (ref 12–49)
MCH RBC QN AUTO: 29.2 PG (ref 26–34)
MCHC RBC AUTO-ENTMCNC: 32.4 G/DL (ref 30–36.5)
MCV RBC AUTO: 90.1 FL (ref 80–99)
MONOCYTES # BLD: 0.7 K/UL (ref 0–1)
MONOCYTES NFR BLD: 8 % (ref 5–13)
NEUTS SEG # BLD: 6 K/UL (ref 1.8–8)
NEUTS SEG NFR BLD: 66 % (ref 32–75)
NRBC # BLD: 0 K/UL (ref 0–0.01)
NRBC BLD-RTO: 0 PER 100 WBC
PLATELET # BLD AUTO: 266 K/UL (ref 150–400)
PMV BLD AUTO: 9.7 FL (ref 8.9–12.9)
RBC # BLD AUTO: 4.35 M/UL (ref 3.8–5.2)
WBC # BLD AUTO: 9 K/UL (ref 3.6–11)

## 2021-07-06 PROCEDURE — 0502F SUBSEQUENT PRENATAL CARE: CPT | Performed by: OBSTETRICS & GYNECOLOGY

## 2021-07-06 PROCEDURE — 65270000029 HC RM PRIVATE

## 2021-07-06 PROCEDURE — 75410000003 HC RECOV DEL/VAG/CSECN EA 0.5 HR: Performed by: OBSTETRICS & GYNECOLOGY

## 2021-07-06 PROCEDURE — 75410000000 HC DELIVERY VAGINAL/SINGLE: Performed by: OBSTETRICS & GYNECOLOGY

## 2021-07-06 PROCEDURE — 76060000078 HC EPIDURAL ANESTHESIA: Performed by: NURSE ANESTHETIST, CERTIFIED REGISTERED

## 2021-07-06 PROCEDURE — 59200 INSERT CERVICAL DILATOR: CPT | Performed by: OBSTETRICS & GYNECOLOGY

## 2021-07-06 PROCEDURE — 36415 COLL VENOUS BLD VENIPUNCTURE: CPT

## 2021-07-06 PROCEDURE — 85025 COMPLETE CBC W/AUTO DIFF WBC: CPT

## 2021-07-06 PROCEDURE — 74011250636 HC RX REV CODE- 250/636: Performed by: OBSTETRICS & GYNECOLOGY

## 2021-07-06 PROCEDURE — 75410000002 HC LABOR FEE PER 1 HR: Performed by: OBSTETRICS & GYNECOLOGY

## 2021-07-06 RX ORDER — OXYTOCIN/RINGER'S LACTATE 30/500 ML
0-20 PLASTIC BAG, INJECTION (ML) INTRAVENOUS
Status: DISCONTINUED | OUTPATIENT
Start: 2021-07-07 | End: 2021-07-09 | Stop reason: HOSPADM

## 2021-07-06 RX ORDER — DIPHENHYDRAMINE HCL 25 MG
50 CAPSULE ORAL
Status: DISCONTINUED | OUTPATIENT
Start: 2021-07-06 | End: 2021-07-09 | Stop reason: HOSPADM

## 2021-07-06 RX ORDER — GUAIFENESIN 600 MG/1
600 TABLET, EXTENDED RELEASE ORAL
Status: DISCONTINUED | OUTPATIENT
Start: 2021-07-06 | End: 2021-07-09 | Stop reason: HOSPADM

## 2021-07-06 RX ORDER — PSEUDOEPHEDRINE HCL 30 MG
TABLET ORAL
COMMUNITY
End: 2021-10-18

## 2021-07-06 RX ORDER — NALOXONE HYDROCHLORIDE 0.4 MG/ML
0.4 INJECTION, SOLUTION INTRAMUSCULAR; INTRAVENOUS; SUBCUTANEOUS AS NEEDED
Status: DISCONTINUED | OUTPATIENT
Start: 2021-07-06 | End: 2021-07-08 | Stop reason: HOSPADM

## 2021-07-06 RX ORDER — NALBUPHINE HYDROCHLORIDE 10 MG/ML
10 INJECTION, SOLUTION INTRAMUSCULAR; INTRAVENOUS; SUBCUTANEOUS
Status: DISCONTINUED | OUTPATIENT
Start: 2021-07-06 | End: 2021-07-09 | Stop reason: HOSPADM

## 2021-07-06 RX ORDER — MAG HYDROX/ALUMINUM HYD/SIMETH 200-200-20
30 SUSPENSION, ORAL (FINAL DOSE FORM) ORAL
Status: DISCONTINUED | OUTPATIENT
Start: 2021-07-06 | End: 2021-07-08 | Stop reason: HOSPADM

## 2021-07-06 RX ORDER — OXYTOCIN/RINGER'S LACTATE 30/500 ML
0-20 PLASTIC BAG, INJECTION (ML) INTRAVENOUS
Status: DISCONTINUED | OUTPATIENT
Start: 2021-07-07 | End: 2021-07-06

## 2021-07-06 RX ORDER — ACETAMINOPHEN 325 MG/1
650 TABLET ORAL
Status: DISCONTINUED | OUTPATIENT
Start: 2021-07-06 | End: 2021-07-09 | Stop reason: HOSPADM

## 2021-07-06 RX ORDER — SODIUM CHLORIDE, SODIUM LACTATE, POTASSIUM CHLORIDE, CALCIUM CHLORIDE 600; 310; 30; 20 MG/100ML; MG/100ML; MG/100ML; MG/100ML
125 INJECTION, SOLUTION INTRAVENOUS CONTINUOUS
Status: DISCONTINUED | OUTPATIENT
Start: 2021-07-06 | End: 2021-07-06

## 2021-07-06 RX ORDER — DIPHENHYDRAMINE HCL 25 MG
25 CAPSULE ORAL
COMMUNITY

## 2021-07-06 RX ORDER — SODIUM CHLORIDE, SODIUM LACTATE, POTASSIUM CHLORIDE, CALCIUM CHLORIDE 600; 310; 30; 20 MG/100ML; MG/100ML; MG/100ML; MG/100ML
125 INJECTION, SOLUTION INTRAVENOUS CONTINUOUS
Status: DISCONTINUED | OUTPATIENT
Start: 2021-07-07 | End: 2021-07-09 | Stop reason: HOSPADM

## 2021-07-06 RX ADMIN — SODIUM CHLORIDE, POTASSIUM CHLORIDE, SODIUM LACTATE AND CALCIUM CHLORIDE 125 ML/HR: 600; 310; 30; 20 INJECTION, SOLUTION INTRAVENOUS at 19:06

## 2021-07-06 NOTE — PROGRESS NOTES
[facility]  [email or web address]  [phone]    Jillian Singleton MD, FACOG     Follow-up OB visit    Chief Complaint   Patient presents with    Routine Prenatal Visit     person insert       Patient Active Problem List    Diagnosis Date Noted    Disorder of thyroid, antepartum 2020    Supervision of other normal pregnancy, antepartum 2016    Chronic migraine without aura without status migrainosus, not intractable 2015    Analgesic overuse headache 2015    Intractable migraine without aura 2015    Thyroid cancer ([facility] [address] 01/10/2013          The patient reports the following concerns: C/o a head cold this past weekend; taking benadryl. C/o swelling in lower legs & feet & vaginal pressure. Person today, on 21 she was 2cm dilated. Vitals:    21 1525   BP: 131/86   Pulse: (!) 114   Weight: 207 lb (93.9 kg)   Height: 4' 11\" (1.499 m)     See PN flowsheet for exam  No suspicious vulvar lesions for HSV     31 y.o.  39w3d   Encounter Diagnosis   Name Primary?     Supervision of other normal pregnancy, antepartum Yes     Person inserted in office  To L and D for NST  Pitocin at 5am  Epidural in am or with labor  Pt may take home thyroid meds  Benadryl prn    Attempt to call report to L and D x 3   [] SAB/bleeding precautions reviewed   [] PTL/PPROM precautions reviewed   [] Labor precautions reviewed   [] Fetal kick counts discussed   [] Labs reviewed with patient   [] Lizzeth Nail precautions reviewed   [] Consent reviewed   [] Handouts given to pt   [] Glucola handout    [] GBS/labor/Magic Hour handout   []    [] We reviewed CDC recommendations for Tdap for patient and close contacts and RBA of receiving in pregnancy, advised obtaining in third trimester   [] Reviewed healthy nutrition in pregnancy and good exercise practices   [] We disc safer medications in pregnancy and referred patient to [facility] resources   [] We reviewed CDC recommendations for flu vaccine and RBA of receiving in pregnancy   []    []    []       Follow-up and Dispositions    · Return in about 6 weeks (around 8/17/2021) for Postpartum visit. No orders of the defined types were placed in this encounter.       Janith Fabry, MD

## 2021-07-06 NOTE — PROGRESS NOTES
1830: pt presents for scheduled induction from the office after person bulb placement. Pt very uncomfortable on arrival, efm initiated. Pt and s/o oriented to unit and call system. Upon inspection, person bulb in vaginal vault and easily removed.

## 2021-07-06 NOTE — PROGRESS NOTES
Elana Shelley MD, FACOG       Chart reviewed for the following:   Emilio JACOBS, have reviewed the pertinent history and updated the medications and allergic reactions for [employer]. TIME OUT performed immediately prior to start of procedure:   Emilio JACOBS, have performed the following reviews on [employer] prior to the start of the procedure:            * Patient was identified by name and date of birth   * Agreement on procedure being performed was verified  * Risks and Benefits explained to the patient  * Procedure site verified and marked as necessary  * Patient was positioned for comfort  * Consent was signed and verified     Time: 3:50pm    Date of procedure: 2021    Procedure performed by: Mo Byrd MD    Provider assisted by: Rosie Dudley    Patient assisted by:     How tolerated by patient: tolerated the procedure well with no complications    Comments: none    I was present for the entire procedure and agree with the above attestation. Mesfin Friend MD      Cervical Person insertion Procedure Note    Niall Yan is a , 31 y.o. 39w3d who presents today far placement of a person catheter in the cervix for ripening. Her cervix is unfavorable and she is scheduled for induction tomorrow. She has elected to have a cervical person catheter placement today. The risks, benefits and assets of the procedure were discussed. Her questions were answered. PROCEDURE: The person catheter was prepped with Betadine. A 60 [place] person catheter was placed through the cervix without difficulty. The bulb was inflated with 60 cc of saline. Bleeding was minimal. The patient's level of discomfort was minimal.    POST PROCEDURE: The patient tolerated the procedure well. There were no complications. She was observed for 10 minutes. Report was called to Labor and Delivery and she was admitted for induction of labor.     She was instructed to check in to Labor and Delivery upon discharge from the office. NST for fetal tachycardia.

## 2021-07-07 ENCOUNTER — ANESTHESIA EVENT (OUTPATIENT)
Dept: LABOR AND DELIVERY | Age: 36
End: 2021-07-07
Payer: COMMERCIAL

## 2021-07-07 ENCOUNTER — ANESTHESIA (OUTPATIENT)
Dept: LABOR AND DELIVERY | Age: 36
End: 2021-07-07
Payer: COMMERCIAL

## 2021-07-07 PROCEDURE — 59400 OBSTETRICAL CARE: CPT | Performed by: OBSTETRICS & GYNECOLOGY

## 2021-07-07 PROCEDURE — 77030005513 HC CATH URETH FOL11 MDII -B

## 2021-07-07 PROCEDURE — 00HU33Z INSERTION OF INFUSION DEVICE INTO SPINAL CANAL, PERCUTANEOUS APPROACH: ICD-10-PCS | Performed by: OBSTETRICS & GYNECOLOGY

## 2021-07-07 PROCEDURE — 74011000250 HC RX REV CODE- 250: Performed by: ANESTHESIOLOGY

## 2021-07-07 PROCEDURE — 77030014125 HC TY EPDRL BBMI -B: Performed by: ANESTHESIOLOGY

## 2021-07-07 PROCEDURE — 10907ZC DRAINAGE OF AMNIOTIC FLUID, THERAPEUTIC FROM PRODUCTS OF CONCEPTION, VIA NATURAL OR ARTIFICIAL OPENING: ICD-10-PCS | Performed by: OBSTETRICS & GYNECOLOGY

## 2021-07-07 PROCEDURE — 74011000250 HC RX REV CODE- 250: Performed by: NURSE ANESTHETIST, CERTIFIED REGISTERED

## 2021-07-07 PROCEDURE — 0KQM0ZZ REPAIR PERINEUM MUSCLE, OPEN APPROACH: ICD-10-PCS | Performed by: OBSTETRICS & GYNECOLOGY

## 2021-07-07 PROCEDURE — 2709999900 HC NON-CHARGEABLE SUPPLY

## 2021-07-07 PROCEDURE — 74011250637 HC RX REV CODE- 250/637: Performed by: OBSTETRICS & GYNECOLOGY

## 2021-07-07 PROCEDURE — 74011250636 HC RX REV CODE- 250/636: Performed by: OBSTETRICS & GYNECOLOGY

## 2021-07-07 PROCEDURE — 65270000029 HC RM PRIVATE

## 2021-07-07 PROCEDURE — 3E033VJ INTRODUCTION OF OTHER HORMONE INTO PERIPHERAL VEIN, PERCUTANEOUS APPROACH: ICD-10-PCS | Performed by: OBSTETRICS & GYNECOLOGY

## 2021-07-07 PROCEDURE — 65410000002 HC RM PRIVATE OB

## 2021-07-07 PROCEDURE — 75410000002 HC LABOR FEE PER 1 HR: Performed by: OBSTETRICS & GYNECOLOGY

## 2021-07-07 RX ORDER — OXYCODONE AND ACETAMINOPHEN 5; 325 MG/1; MG/1
1 TABLET ORAL
Status: DISCONTINUED | OUTPATIENT
Start: 2021-07-07 | End: 2021-07-09 | Stop reason: HOSPADM

## 2021-07-07 RX ORDER — SIMETHICONE 80 MG
80 TABLET,CHEWABLE ORAL
Status: DISCONTINUED | OUTPATIENT
Start: 2021-07-07 | End: 2021-07-09 | Stop reason: HOSPADM

## 2021-07-07 RX ORDER — HYDROCORTISONE ACETATE PRAMOXINE HCL 2.5; 1 G/100G; G/100G
CREAM TOPICAL AS NEEDED
Status: DISCONTINUED | OUTPATIENT
Start: 2021-07-07 | End: 2021-07-07

## 2021-07-07 RX ORDER — LIDOCAINE HYDROCHLORIDE AND EPINEPHRINE 15; 5 MG/ML; UG/ML
INJECTION, SOLUTION EPIDURAL
Status: SHIPPED | OUTPATIENT
Start: 2021-07-07 | End: 2021-07-07

## 2021-07-07 RX ORDER — IBUPROFEN 800 MG/1
800 TABLET ORAL EVERY 8 HOURS
Status: DISCONTINUED | OUTPATIENT
Start: 2021-07-07 | End: 2021-07-09 | Stop reason: HOSPADM

## 2021-07-07 RX ORDER — BUPIVACAINE HYDROCHLORIDE 2.5 MG/ML
INJECTION, SOLUTION EPIDURAL; INFILTRATION; INTRACAUDAL AS NEEDED
Status: DISCONTINUED | OUTPATIENT
Start: 2021-07-07 | End: 2021-07-07 | Stop reason: HOSPADM

## 2021-07-07 RX ORDER — DOCUSATE SODIUM 100 MG/1
100 CAPSULE, LIQUID FILLED ORAL
Status: DISCONTINUED | OUTPATIENT
Start: 2021-07-07 | End: 2021-07-09 | Stop reason: HOSPADM

## 2021-07-07 RX ORDER — ONDANSETRON 4 MG/1
4 TABLET, ORALLY DISINTEGRATING ORAL
Status: ACTIVE | OUTPATIENT
Start: 2021-07-07 | End: 2021-07-08

## 2021-07-07 RX ORDER — ONDANSETRON 2 MG/ML
4 INJECTION INTRAMUSCULAR; INTRAVENOUS
Status: DISCONTINUED | OUTPATIENT
Start: 2021-07-07 | End: 2021-07-09 | Stop reason: HOSPADM

## 2021-07-07 RX ORDER — SODIUM CHLORIDE 0.9 % (FLUSH) 0.9 %
5-40 SYRINGE (ML) INJECTION EVERY 8 HOURS
Status: DISCONTINUED | OUTPATIENT
Start: 2021-07-07 | End: 2021-07-09 | Stop reason: HOSPADM

## 2021-07-07 RX ORDER — OXYTOCIN/RINGER'S LACTATE 30/500 ML
10 PLASTIC BAG, INJECTION (ML) INTRAVENOUS AS NEEDED
Status: DISCONTINUED | OUTPATIENT
Start: 2021-07-07 | End: 2021-07-09 | Stop reason: HOSPADM

## 2021-07-07 RX ORDER — OXYTOCIN/RINGER'S LACTATE 30/500 ML
87.3 PLASTIC BAG, INJECTION (ML) INTRAVENOUS AS NEEDED
Status: DISCONTINUED | OUTPATIENT
Start: 2021-07-07 | End: 2021-07-09 | Stop reason: HOSPADM

## 2021-07-07 RX ORDER — HYDROCODONE BITARTRATE AND ACETAMINOPHEN 5; 325 MG/1; MG/1
1 TABLET ORAL
Status: DISCONTINUED | OUTPATIENT
Start: 2021-07-07 | End: 2021-07-09 | Stop reason: HOSPADM

## 2021-07-07 RX ORDER — FENTANYL/BUPIVACAINE/NS/PF 2-1250MCG
1-16 PREFILLED PUMP RESERVOIR EPIDURAL CONTINUOUS
Status: DISCONTINUED | OUTPATIENT
Start: 2021-07-07 | End: 2021-07-08 | Stop reason: HOSPADM

## 2021-07-07 RX ORDER — ACETAMINOPHEN 325 MG/1
650 TABLET ORAL
Status: DISCONTINUED | OUTPATIENT
Start: 2021-07-07 | End: 2021-07-09 | Stop reason: HOSPADM

## 2021-07-07 RX ORDER — OXYTOCIN/RINGER'S LACTATE 30/500 ML
87.3 PLASTIC BAG, INJECTION (ML) INTRAVENOUS AS NEEDED
Status: COMPLETED | OUTPATIENT
Start: 2021-07-07 | End: 2021-07-07

## 2021-07-07 RX ORDER — DIPHENHYDRAMINE HCL 25 MG
25 CAPSULE ORAL
Status: DISCONTINUED | OUTPATIENT
Start: 2021-07-07 | End: 2021-07-09 | Stop reason: HOSPADM

## 2021-07-07 RX ORDER — OXYTOCIN/RINGER'S LACTATE 30/500 ML
10 PLASTIC BAG, INJECTION (ML) INTRAVENOUS AS NEEDED
Status: COMPLETED | OUTPATIENT
Start: 2021-07-07 | End: 2021-07-07

## 2021-07-07 RX ORDER — NALOXONE HYDROCHLORIDE 0.4 MG/ML
0.4 INJECTION, SOLUTION INTRAMUSCULAR; INTRAVENOUS; SUBCUTANEOUS AS NEEDED
Status: DISCONTINUED | OUTPATIENT
Start: 2021-07-07 | End: 2021-07-09 | Stop reason: HOSPADM

## 2021-07-07 RX ORDER — SODIUM CHLORIDE 0.9 % (FLUSH) 0.9 %
5-40 SYRINGE (ML) INJECTION AS NEEDED
Status: DISCONTINUED | OUTPATIENT
Start: 2021-07-07 | End: 2021-07-09 | Stop reason: HOSPADM

## 2021-07-07 RX ADMIN — BUPIVACAINE HYDROCHLORIDE 4 ML: 2.5 INJECTION, SOLUTION EPIDURAL; INFILTRATION; INTRACAUDAL; PERINEURAL at 12:08

## 2021-07-07 RX ADMIN — SODIUM CHLORIDE, POTASSIUM CHLORIDE, SODIUM LACTATE AND CALCIUM CHLORIDE 999 ML/HR: 600; 310; 30; 20 INJECTION, SOLUTION INTRAVENOUS at 07:16

## 2021-07-07 RX ADMIN — BUPIVACAINE HYDROCHLORIDE 1 ML: 2.5 INJECTION, SOLUTION EPIDURAL; INFILTRATION; INTRACAUDAL at 12:58

## 2021-07-07 RX ADMIN — BUPIVACAINE HYDROCHLORIDE 4 ML: 2.5 INJECTION, SOLUTION EPIDURAL; INFILTRATION; INTRACAUDAL; PERINEURAL at 11:18

## 2021-07-07 RX ADMIN — Medication 8 ML/HR: at 15:15

## 2021-07-07 RX ADMIN — BUPIVACAINE HYDROCHLORIDE 4 ML: 2.5 INJECTION, SOLUTION EPIDURAL; INFILTRATION; INTRACAUDAL; PERINEURAL at 11:16

## 2021-07-07 RX ADMIN — IBUPROFEN 800 MG: 800 TABLET ORAL at 18:50

## 2021-07-07 RX ADMIN — SODIUM CHLORIDE, POTASSIUM CHLORIDE, SODIUM LACTATE AND CALCIUM CHLORIDE 125 ML/HR: 600; 310; 30; 20 INJECTION, SOLUTION INTRAVENOUS at 05:01

## 2021-07-07 RX ADMIN — LIDOCAINE HYDROCHLORIDE AND EPINEPHRINE 5 ML: 15; 5 INJECTION, SOLUTION EPIDURAL at 12:08

## 2021-07-07 RX ADMIN — BUPIVACAINE HYDROCHLORIDE 4 ML: 2.5 INJECTION, SOLUTION EPIDURAL; INFILTRATION; INTRACAUDAL; PERINEURAL at 07:52

## 2021-07-07 RX ADMIN — BUPIVACAINE HYDROCHLORIDE 4 ML: 2.5 INJECTION, SOLUTION EPIDURAL; INFILTRATION; INTRACAUDAL; PERINEURAL at 12:05

## 2021-07-07 RX ADMIN — LIDOCAINE HYDROCHLORIDE AND EPINEPHRINE 3 ML: 15; 5 INJECTION, SOLUTION EPIDURAL at 12:59

## 2021-07-07 RX ADMIN — ONDANSETRON 4 MG: 2 INJECTION INTRAMUSCULAR; INTRAVENOUS at 11:30

## 2021-07-07 RX ADMIN — OXYTOCIN 87.3 MILLI-UNITS/MIN: 10 INJECTION, SOLUTION INTRAMUSCULAR; INTRAVENOUS at 16:42

## 2021-07-07 RX ADMIN — HYDROCODONE BITARTRATE AND ACETAMINOPHEN 1 TABLET: 5; 325 TABLET ORAL at 18:50

## 2021-07-07 RX ADMIN — BUPIVACAINE HYDROCHLORIDE 3 ML: 2.5 INJECTION, SOLUTION EPIDURAL; INFILTRATION; INTRACAUDAL; PERINEURAL at 12:59

## 2021-07-07 RX ADMIN — LIDOCAINE HYDROCHLORIDE,EPINEPHRINE BITARTRATE 3 ML: 15; .005 INJECTION, SOLUTION EPIDURAL; INFILTRATION; INTRACAUDAL; PERINEURAL at 07:44

## 2021-07-07 RX ADMIN — BUPIVACAINE HYDROCHLORIDE 4 ML: 2.5 INJECTION, SOLUTION EPIDURAL; INFILTRATION; INTRACAUDAL; PERINEURAL at 07:50

## 2021-07-07 RX ADMIN — OXYTOCIN 2 MILLI-UNITS/MIN: 10 INJECTION, SOLUTION INTRAMUSCULAR; INTRAVENOUS at 05:00

## 2021-07-07 RX ADMIN — SODIUM CHLORIDE, POTASSIUM CHLORIDE, SODIUM LACTATE AND CALCIUM CHLORIDE 125 ML/HR: 600; 310; 30; 20 INJECTION, SOLUTION INTRAVENOUS at 08:10

## 2021-07-07 RX ADMIN — Medication 10 ML/HR: at 08:10

## 2021-07-07 RX ADMIN — OXYTOCIN 10000 MILLI-UNITS: 10 INJECTION, SOLUTION INTRAMUSCULAR; INTRAVENOUS at 16:23

## 2021-07-07 NOTE — PROGRESS NOTES
2235: Bedside and Verbal shift change report given to IVY Saucedo RN and RITO VALLEJO (oncoming nurse) by KELLY Fenton RN (offgoing nurse). Report included the following information SBAR, Kardex, Intake/Output, MAR, Recent Results and Med Rec Status. This RN precepting RITO Ng and is overseeing all pt care and charting. 9996: Pt c/o vaginal pain and pressure. SVE per IVY Saucedo RN 3-4/50/-3.     0700: Bedside and Verbal shift change report given to KELLY Benitez RN (oncoming nurse) by IVY Saucedo RN (offgoing nurse). Report included the following information SBAR, Kardex, Intake/Output, Accordion, Recent Results and Med Rec Status.

## 2021-07-07 NOTE — PROGRESS NOTES
0700: Bedside and Verbal shift change report given to EVELIA Benitez RN (oncoming nurse) by Molly Macias RN (offgoing nurse). Report included the following information SBAR, Kardex, Intake/Output, MAR, Recent Results, Med Rec Status and Alarm Parameters . [id number]: Pt up to bathroom to void. Steady gait noted. 4901: Nav Lewisho at bedside for epidural placement. RN remaining at bedside continuously attempting to monitor [address].     6543: Pt placed back in bed.     7421: Dr. Sharlene Oneill at bedside. SVE 4.5/50/-3. AROM clear fluid. 1158: Jerad Duckworth CRNA at bedside for epidural replacement. Per CRNA catheter tip displaced    1251: A Parish CRNA at bedside for epidural replacement. Pt reports no relief of pain. 1307: Dr. Sharlene Oneill at bedside. SVE 10/100/0. FSE applied. 1310: Pt positioned to L lateral with R leg in stirrup.     1422: Pt positioned to R lateral with L leg in stirrup. 1530: Patient actively pushing. RN remains in continuous attendance at the bedside. Assessment & evaluation of fetal heart rate ongoing via continuous EFM.    1602: RN remained at bedside throughout pushing. EFM continuously assessed. Vaginal delivery of viable infant. 1900: Bedside and Verbal shift change report given to Cherise (oncoming nurse) by Nelia Smith RN (offgoing nurse). Report included the following information SBAR, Kardex, Intake/Output, MAR, Recent Results, Med Rec Status, Alarm Parameters  and Quality Measures.

## 2021-07-07 NOTE — PROGRESS NOTES
2235: Bedside and Verbal shift change report given to IVY Saucedo RN and RITO VALLEJO (oncoming nurse) by KELLY Rowe RN (offgoing nurse). Report included the following information SBAR, Kardex, Intake/Output, MAR, Recent Results and Med Rec Status. 2335: SN rounded on pt. Pt sleeping at this time.

## 2021-07-07 NOTE — PROGRESS NOTES
Pt still uncomfortable. Sitting up for epidural replacement    Visit Vitals  /68   Pulse 86   Temp 97.8 °F (36.6 °C)   Resp 14   SpO2 97%   Breastfeeding No       C/C 0 per last nursing em. Will recheck when epidural complete.      Parminder Molina MD

## 2021-07-07 NOTE — PROGRESS NOTES
Delivery Note    Patient C/C/+2, pushed over intact perineum. NC reduced at delivery  LBMI  Delayed cord clamping  Spontaneous placenta delivery. Laceration repaired  2nd degree in layers  Counts correct.   Fundus firm   cc    Parminder Molina MD

## 2021-07-07 NOTE — PROGRESS NOTES
Pt c/c/ 0-+1    ZAHRA    Will begin pushing    Visit Vitals  BP 99/63   Pulse (!) 102   Temp 97.8 °F (36.6 °C)   Resp 14   SpO2 100%   Breastfeeding No     FSE placed at last check for improved fetal monitoring    Marian Adams MD

## 2021-07-07 NOTE — H&P
[address] [facility]  [email or web address]  [phone]    Cedrick Villalobos MD, FACOG     Labor and Delivery History & Physical  Template updated 8935    Name: Kirk Higuera MRN: [id number]  SSN: [id number]    YOB: 1985  Age: 31 y.o. Sex: female      Late entry from exam in office  4pm  Subjective:     Estimated Date of Delivery: 7/10/21  OB History        3    Para   2    Term   2       0    AB   0    Living   2       SAB   0    TAB   0    Ectopic   0    Molar        Multiple   0    Live Births   2          Obstetric Comments   Pushed four hour hours. VAVD x1 no pop off   NC, Dr. Daniel Mcdonald   5/15/2018 TP, IOL for post dates and ? SROM, slow progress, NC x1 with  after pushing 1.5 hours             Ms. Travis Hoang is arrived to L and D from the office with a pregnancy at 39w3d for induction. Prenatal course was complicated by obesity, thyroid disease. Edilma Crofts Please see prenatal records for details. Patient reports good fetal movement, no loss or gush of fluid, no vaginal bleeding, no significant or increase in vaginal discharge     Patient denies chest pain, shortness of breath, right upper quadrant pain, vision changes           Past Medical History:   Diagnosis Date    Abnormal Pap smear of cervix 10/05/2017; 10/30/18    Negative, HR HPV+, 16+, 18 negative; TONE/Neg HPV    Anxiety     Arthritis     Bursitis     and scapulothoracic dyskinesia, right shoulder    Fatigue     Headache     History of colposcopy 2018    Benign    History of endometrial biopsy 2018    Benign    Joint pain     Kidney stones     Muscle pain     Pap smear for cervical cancer screening 2014; 2019    negative; negative, HPV negative    Psychiatric problem     Anxiety    Shoulder bursitis     right    Thyroid cancer ([facility] [address] 1/10/2013    Complete Thyroidectomy .      Past Surgical History:   Procedure Laterality Date    HX COLPOSCOPY  11/2017    HX LITHOTRIPSY      HX ORTHOPAEDIC Left 2007    metal plate/ 7screwes in Left wrist; Broken ulna and radius.  HX THYROIDECTOMY  10/2010    total     Social History     Socioeconomic History    Marital status:      Spouse name: Not on file    Number of children: Not on file    Years of education: Not on file    Highest education level: Not on file   Tobacco Use    Smoking status: Former Smoker    Smokeless tobacco: Never Used    Tobacco comment: Never used vapor or e-cigs    Substance and Sexual Activity    Alcohol use: Yes     Alcohol/week: 0.0 standard drinks     Comment: occasional    Drug use: No     Types: Marijuana     Comment: pt smoked marijuana in the past    Sexual activity: Yes     Partners: Male     Birth control/protection: Condom   Other Topics Concern     Social Determinants of Health     Financial Resource Strain:     Difficulty of Paying Living Expenses:    Food Insecurity:     Worried About Running Out of Food in the Last Year:     [address] in the Last Year:    Transportation Needs:     Lack of Transportation (Medical):      Lack of Transportation (Non-Medical):    Physical Activity:     Days of Exercise per Week:     Minutes of Exercise per Session:    Stress:     Feeling of Stress :    Social Connections:     Frequency of Communication with Friends and Family:     Frequency of Social Gatherings with Friends and Family:     Attends [employer] Services:     Active Member of Clubs or Organizations:     Attends Club or Organization Meetings:     Marital Status:      Family History   Problem Relation Age of Onset    Elevated Lipids Mother     Heart Disease Father     Cancer Other     Headache Other     Dementia Other     MS Other     Parkinson's Disease Other     Breast Cancer Other         paternal great aunt    Breast Cancer Paternal Grandmother     Heart Disease Maternal Grandmother     Heart Disease Maternal Grandfather     MS Maternal Aunt        Allergies   Allergen Reactions    Amoxicillin Hives     Prior to Admission medications    Medication Sig Start Date End Date Taking? Authorizing Provider   diphenhydrAMINE (BenadryL) 25 mg capsule Take 25 mg by mouth every six (6) hours as needed. Yes Provider, Historical   pseudoephedrine (Sudafed) 30 mg tablet Take  by mouth every four (4) hours as needed for Congestion. Yes Provider, Historical   Synthroid 150 mcg tablet 1 tab PO before breakfast 4/26/21  Yes Paula Rojas MD   PNV Comb #2-Iron-FA-Omega 3 29-1-400 mg cmpk Take  by mouth. Yes Provider, Historical   cholecalciferol (VITAMIN D3) (5000 Units/125 mcg) tab tablet Take 10,000 Units by mouth every seven (7) days. 5/22/20   Paula Rojas MD        Active Hospital Problems    Diagnosis Date Noted    39 weeks gestation of pregnancy 07/06/2021       Review of Systems: A comprehensive review of systems was negative except for that written in the HPI. Constitutional: negative for fevers, chills and weight loss  ENT ROS: negative for - hearing change, oral lesions or visual changes  Respiratory: \"cold sx\"  Cardiovascular: negative for chest pain, irregular heart beats, exertional chest pressure/discomfort  Gastrointestinal: negative for dysphagia, nausea and vomiting  Genito-Urinary ROS: see HPI  Inteument/breast: negative for rash, breast lump and nipple discharge  Musculoskeletal:negative for stiff joints, neck pain and muscle weakness  Endocrine ROS: negative for - breast changes, galactorrhea or temperature intolerance  Hematological and Lymphatic ROS: negative for - bruising or swollen lymph nodes      Objective:     Vitals:  Patient Vitals for the past 4 hrs:   Temp Pulse Resp BP SpO2   07/06/21 1912 98.2 °F (36.8 °C) 100 16 126/78 99 %   07/06/21 1834 98 °F (36.7 °C) (!) 106 18 (!) 140/84 --         Physical Exam: from office visit  Patient without distress.   Heart: Regular rate and rhythm or S1S2 present  Lung: clear to auscultation throughout lung fields, no wheezes, no rales, no rhonchi and normal respiratory effort  Abdomen: soft, nontender  Fundus: soft and non tender  Cervical Exam: 50/2/-3 vtx  Cervical person placed in office    Lower Extremities: no c/t trace le edema b/l    Pelvimetry: hollow sacrum, non prominent ischial spines, subpubic angle >90 degrees, parallel vaginal sidewalls    Membranes:  Intact    Prenatal Labs:   Lab Results   Component Value Date/Time    Rubella, External immune 12/17/2020 12:00 AM    GrBStrep, External NEGATIVE 06/23/2021 12:00 AM    HBsAg, External negative 10/07/2017 12:00 AM    HIV, External negative 12/17/2020 12:00 AM    RPR, External nonreactive 12/17/2020 12:00 AM    Gonorrhea, External negative 12/17/2020 12:00 AM    Chlamydia, External negative 12/17/2020 12:00 AM          Recent Results (from the past 12 hour(s))   CBC WITH AUTOMATED DIFF    Collection Time: 07/06/21  7:09 PM   Result Value Ref Range    WBC 9.0 3.6 - 11.0 K/uL    RBC 4.35 3.80 - 5.20 M/uL    HGB 12.7 11.5 - 16.0 g/dL    HCT 39.2 35.0 - 47.0 %    MCV 90.1 80.0 - 99.0 FL    MCH 29.2 26.0 - 34.0 PG    MCHC 32.4 30.0 - 36.5 g/dL    RDW 13.6 11.5 - 14.5 %    PLATELET 350 639 - 865 K/uL    MPV 9.7 8.9 - 12.9 FL    NRBC 0.0 0  WBC    ABSOLUTE NRBC 0.00 0.00 - 0.01 K/uL    NEUTROPHILS 66 32 - 75 %    LYMPHOCYTES 24 12 - 49 %    MONOCYTES 8 5 - 13 %    EOSINOPHILS 1 0 - 7 %    BASOPHILS 0 0 - 1 %    IMMATURE GRANULOCYTES 1 (H) 0.0 - 0.5 %    ABS. NEUTROPHILS 6.0 1.8 - 8.0 K/UL    ABS. LYMPHOCYTES 2.2 0.8 - 3.5 K/UL    ABS. MONOCYTES 0.7 0.0 - 1.0 K/UL    ABS. EOSINOPHILS 0.1 0.0 - 0.4 K/UL    ABS. BASOPHILS 0.0 0.0 - 0.1 K/UL    ABS. IMM.  GRANS. 0.1 (H) 0.00 - 0.04 K/UL    DF AUTOMATED       Prenatal Labs:  Lab Results   Component Value Date/Time    Rubella, External immune 12/17/2020 12:00 AM    GrBStrep, External NEGATIVE 06/23/2021 12:00 AM    HBsAg, External negative 10/07/2017 12:00 AM HIV, External negative 2020 12:00 AM    RPR, External nonreactive 2020 12:00 AM    Gonorrhea, External negative 2020 12:00 AM    Chlamydia, External negative 2020 12:00 AM             Assessment/Plan:   31 y.o.  39w3d  IOL  The patient does not have anemia  GBS negative  Elevated FHT in office with reassuring fetal surveillance  Personal history of thyroid cancer  Ho VAVD, ho   Obesity, BMI 41    Past Medical History:   Diagnosis Date    Abnormal Pap smear of cervix 10/05/2017; 10/30/18    Negative, HR HPV+, 16+, 18 negative; TONE/Neg HPV    Anxiety     Arthritis     Bursitis     and scapulothoracic dyskinesia, right shoulder    Fatigue     Headache     History of colposcopy 2018    Benign    History of endometrial biopsy 2018    Benign    Joint pain     Kidney stones     Muscle pain     Pap smear for cervical cancer screening 2014; 2019    negative; negative, HPV negative    Psychiatric problem     Anxiety    Shoulder bursitis     right    Thyroid cancer ([facility] [address] 1/10/2013    Complete Thyroidectomy . Plan: Admit for IOL. CEFM/TOCO  Pitocin in am    Signed By:  Dionna Davis MD     2021         NST Inpatient Procedure Note    Gina Ordoñez presents for fetal non-stress test.    Indication is IOL. She is 39w3d. She has been monitored for more than 30 minutes. The FHR was reactive. NST Interpretation:   FHR baseline 150 bpm,   Variability:  moderate  Accelerations:  present  Decelerations Absent. Uterine contractions:  Irregular  Assessment  NST is reactive. NST is reassuring. Patient does need admission/observation for further monitoring. Chary Hemphill was informed of the NST results and her questions were answered.     Plan:    [x] Continue admission to labor and delivery    [] Continue observation   [] Keep routine OB follow up upon discharge   [] Reviewed fetal movement kick counts, notify MD if decreased   [] []

## 2021-07-07 NOTE — PROGRESS NOTES
7/7/2021  11:32 AM    CM met with EMILY to complete initial assessment and begin discharge planning. MOB verified and confirmed demographics. MOB lives with spouse/FOB- Hernan Harry ( [phone]) along with their 11, and 3yr old, at the address on file. MOB is employed and plans to take 4wks  off from work. FOB is also employed and will be taking adequate time off. EMILY reports she has good family support. EMILY plans to breast feed baby and has pump to use at home. Dari Brantley will provide follow up care for infant. EMILY has car seat, bassinet/crib, clothing, bottles and all necessary supplies for baby. EMILY has [employer], and will be adding baby to this policy. CM discussed process to add baby to insurance, MOB verbalized understanding. MOB denied needing WIC/Medicaid services. Care Management Interventions  PCP Verified by CM: Yes (Joao)  Mode of Transport at Discharge:  Other (see comment)  Transition of Care Consult (CM Consult): Discharge Planning  Current Support Network: Own Home, Lives with Spouse  Confirm Follow Up Transport: Family  Discharge Location  Discharge Placement: Home with family assistance  Rajesh Ruiz

## 2021-07-07 NOTE — ANESTHESIA PROCEDURE NOTES
CSE Block    Start time: 7/7/2021 12:51 PM  End time: 7/7/2021 12:59 PM  Performed by: Carol Goff CRNA  Authorized by: Breanna Bailey MD     Pre-Procedure  preanesthetic checklist: patient identified, risks and benefits discussed, anesthesia consent, site marked, patient being monitored and timeout performed    Timeout Time: 12:51 EDT        Procedure:   Patient Position:  Seated  Prep Region:  Lumbar  Location:  L2-3    Epidural Needle:   Needle Type:  Tuohy  Needle Gauge:  18 G  Injection Technique:  Loss of resistance using air  Attempts:  1    Spinal Needle:   Needle Type:   Radha  Needle Gauge:  27 G    Catheter:   Catheter Type:  Flex-tip  Catheter Size:  20 G  Catheter at Skin Depth (cm):  9  Events: no blood with aspiration, no cerebrospinal fluid with aspiration, no paresthesia and negative aspiration test    Test Dose:  Negative    Assessment:   Catheter Secured:  Tegaderm  Insertion:  Uncomplicated  Patient tolerance:  Patient tolerated the procedure well with no immediate complications

## 2021-07-07 NOTE — ANESTHESIA PROCEDURE NOTES
Epidural Block    Patient location during procedure: OB  Start time: 7/7/2021 12:08 PM  End time: 7/7/2021 12:18 PM  Reason for block: labor epidural  Staffing  Performed: CRNA   Anesthesiologist: Breanna Bailey MD  Resident/CRNA: Carol Goff CRNA  Preanesthetic Checklist  Completed: patient identified, IV checked, site marked, risks and benefits discussed, surgical consent, monitors and equipment checked, pre-op evaluation and timeout performed  Block Placement  Patient position: sitting  Prep: ChloraPrep  Sterility prep: cap, drape and mask  Sedation level: no sedation  Location: lumbar  Epidural  Loss of resistance technique: air  Guidance: landmark technique  Needle  Needle gauge: 16 G  Catheter at skin depth: 10 cm  Catheter securement method: clear occlusive dressing and surgical tape  Test dose: negative  Medications Administered  Lidocaine-EPINEPHrine (XYLOCAINE) 1.5 %-1:200,000 Epidural, 5 mL  Assessment  Block outcome: pain improved  Number of attempts: 1  Procedure assessment: patient tolerated procedure well with no immediate complications

## 2021-07-07 NOTE — PROGRESS NOTES
Pt with inc pain:   7-8 cm by nursing  But epidural no longer in proper place.     Visit Vitals  /68   Pulse 86   Temp 97.8 °F (36.6 °C)   Resp 14   SpO2 97%   Breastfeeding No         Will replace epidural.    31 y.o.  39w4d   Labor    Anticipate LOAN Hernandez MD

## 2021-07-07 NOTE — ANESTHESIA PREPROCEDURE EVALUATION
Relevant Problems   No relevant active problems       Anesthetic History   No history of anesthetic complications            Review of Systems / Medical History  Patient summary reviewed, nursing notes reviewed and pertinent labs reviewed    Pulmonary  Within defined limits                 Neuro/Psych   Within defined limits           Cardiovascular  Within defined limits                Exercise tolerance: >4 METS     GI/Hepatic/Renal  Within defined limits              Endo/Other  Within defined limits           Other Findings              Physical Exam    Airway  Mallampati: II  TM Distance: 4 - 6 cm  Neck ROM: normal range of motion        Cardiovascular  Regular rate and rhythm,  S1 and S2 normal,  no murmur, click, rub, or gallop  Rhythm: regular  Rate: normal         Dental  No notable dental hx       Pulmonary  Breath sounds clear to auscultation               Abdominal  GI exam deferred       Other Findings            Anesthetic Plan    ASA: 2  Anesthesia type: epidural            Anesthetic plan and risks discussed with: Patient

## 2021-07-07 NOTE — PROGRESS NOTES
Labor Progress Note    Patient seen, fetal heart rate and contraction pattern evaluated. Comfortable with epidural.     Physical Exam:  Visit Vitals  /68   Pulse 86   Temp 97.8 °F (36.6 °C)   Resp 14   SpO2 97%   Breastfeeding No     Gen: resting upon entry to room  Alert upon verbal stimulation    Cervical Exam: 50/4.5/-3/vtx    Membranes:  Artificial Rupture of Membranes; Amniotic Fluid: medium amount of clear fluid      Assessment/Plan:  31 y.o.  39w4d     Reassuring fetal status. Anticipate   CEFM/JOHAN Dhillon MD      NST Inpatient Procedure Note    Kirk Higuera presents for fetal non-stress test.    Indication is labor. She is 39w4d. She has been monitored for more than 60 minutes. The FHR was reactive. NST Interpretation:   FHR baseline 130 bpm,   Variability moderate  Accelerations present. Decelerations Absent. Uterine contractions were q 3-4 minutes     Assessment  NST is reactive. NST is reassuring. Patient does need admission/observation for further monitoring.       Plan:    [x] Continue admission to labor and delivery    [] Continue observation   [] Keep routine OB follow up upon discharge   [] Reviewed fetal movement kick counts, notify MD if decreased   []    []

## 2021-07-07 NOTE — ANESTHESIA PROCEDURE NOTES
Epidural Block    Patient location during procedure: OB  Start time: 7/7/2021 7:44 AM  End time: 7/7/2021 7:54 AM  Reason for block: labor epidural  Staffing  Performed: CRNA   Anesthesiologist: Susan Heredia MD  Resident/CRNA: Landry Christensen CRNA  Preanesthetic Checklist  Completed: patient identified, IV checked, site marked, risks and benefits discussed, surgical consent, monitors and equipment checked, pre-op evaluation and timeout performed  Block Placement  Patient position: sitting  Prep: ChloraPrep  Sterility prep: cap, drape, gloves and mask  Sedation level: no sedation  Patient monitoring: continuous pulse oximetry, frequent blood pressure checks and heart rate  Approach: midline  Location: lumbar  Lumbar location: L2-L3  Epidural  Guidance: landmark technique  Needle  Needle type: Tuohy   Needle gauge: 18 G  Catheter type: multi-orifice  Catheter size: 20 G  Catheter at skin depth: 9 cm  Catheter securement method: clear occlusive dressing and surgical tape  Test dose: negative  Medications Administered  Lidocaine-EPINEPHrine (XYLOCAINE) 1.5 %-1:200,000 Epidural, 3 mL  Assessment  Block outcome: pain improved  Number of attempts: 2  Procedure assessment: patient tolerated procedure well with no immediate complications

## 2021-07-08 LAB
HSV1 DNA SPEC QL NAA+PROBE: NEGATIVE
HSV2 DNA SPEC QL NAA+PROBE: NEGATIVE
SPECIMEN STATUS REPORT, ROLRST: NORMAL

## 2021-07-08 PROCEDURE — 65270000029 HC RM PRIVATE

## 2021-07-08 PROCEDURE — 74011250637 HC RX REV CODE- 250/637: Performed by: OBSTETRICS & GYNECOLOGY

## 2021-07-08 RX ORDER — IBUPROFEN 600 MG/1
600 TABLET ORAL
Qty: 30 TABLET | Refills: 0 | Status: SHIPPED | OUTPATIENT
Start: 2021-07-08

## 2021-07-08 RX ADMIN — OXYCODONE HYDROCHLORIDE AND ACETAMINOPHEN 1 TABLET: 5; 325 TABLET ORAL at 22:25

## 2021-07-08 RX ADMIN — IBUPROFEN 800 MG: 800 TABLET ORAL at 18:34

## 2021-07-08 RX ADMIN — IBUPROFEN 800 MG: 800 TABLET ORAL at 10:52

## 2021-07-08 RX ADMIN — OXYCODONE HYDROCHLORIDE AND ACETAMINOPHEN 1 TABLET: 5; 325 TABLET ORAL at 12:40

## 2021-07-08 RX ADMIN — DOCUSATE SODIUM 100 MG: 100 CAPSULE, LIQUID FILLED ORAL at 12:43

## 2021-07-08 RX ADMIN — IBUPROFEN 800 MG: 800 TABLET ORAL at 02:58

## 2021-07-08 RX ADMIN — HYDROCODONE BITARTRATE AND ACETAMINOPHEN 1 TABLET: 5; 325 TABLET ORAL at 02:58

## 2021-07-08 NOTE — PROGRESS NOTES
Post-Partum Progress Note    Patient doing well post-partum without significant complaint. She is voiding without difficulty, she reports normal lochia. Her pain is well controlled with oral pain medication. She is tolerating a general diet. Delivery information:  Information for the patient's :  Roshni Medina, Male Evelia Khoury [[id number]]   Delivery of a 7 lb 13.8 oz (3.565 kg) male infant via Vaginal, Spontaneous on 2021 at 4:02 PM  by Melo Duenas. Apgars were 9  and 9 . Vitals:    Patient Vitals for the past 8 hrs:   BP Temp Pulse Resp SpO2   21 1544 118/82 98.6 °F (37 °C) 91 16 97 %     Temp (24hrs), Av.5 °F (36.9 °C), Min:98 °F (36.7 °C), Max:98.6 °F (37 °C)    Visit Vitals  /82 (BP 1 Location: Left upper arm, BP Patient Position: At rest;Sitting)   Pulse 91   Temp 98.6 °F (37 °C)   Resp 16   SpO2 97%   Breastfeeding Unknown       Exam:    General: Patient without distress. Abdomen: soft, fundus firm at level of umbilicus, nontender  Lower extremities: negative for cords or tenderness. Labs: No results found for this or any previous visit (from the past 24 hour(s)). Assessment:    1. Postpartum S/P spontaneous vaginal delivery   2. Patient doing well without significant complications    Plan:  1. Continue routine postpartum care  2. Routine perineal care and maternal education   3. Oral pain medications and bowel regimen as needed       4. The risks and benefits of the circumcision  procedure and anesthesia including: bleeding, infection, variability of cosmetic results were discussed at length with the mother. She is aware that future repeat procedures may be necessary. She gives informed consent to proceed as noted and her questions are answered. 5. Discussed early discharge with nursery, will hold for feeding issues.      Eliazar Fair MD

## 2021-07-08 NOTE — LACTATION NOTE
This note was copied from a baby's chart. Mother attempting to nap, baby asleep in basinet, father at bedside. Mother states baby has been sleepy overall but last feeding baby nursed well. Mother states they would like a 24 hour discharge. BF teaching done for a 24 hour discharge. Discussed with mother her plan for feeding. Reviewed the benefits of exclusive breast milk feeding during the hospital stay. Informed her of the risks of using formula to supplement in the first few days of life as well as the benefits of successful breast milk feeding; referred her to the Breastfeeding booklet about this information. She acknowledges understanding of information reviewed and states that it is her plan to breastfeed her infant. Will support her choice and offer additional information as needed. Reviewed breastfeeding basics:  How milk is made and normal  breastfeeding behaviors discussed. Supply and demand,  stomach size, early feeding cues, skin to skin bonding with comfortable positioning and baby led latch-on reviewed. How to identify signs of successful breastfeeding sessions reviewed; education on assymetrical latch, signs of effective latching vs shallow, in-effective latching, normal  feeding frequency and duration and expected infant output discussed. Normal course of breastfeeding discussed including the AAP's recommendation that children receive exclusive breast milk feedings for the first six months of life with breast milk feedings to continue through the first year of life and/or beyond as complimentary table foods are added. Breastfeeding Booklet and Warm line information provided with discussion. Discussed typical  weight loss and the importance of pediatrician appointment within 24-48 hours of discharge, at 2 weeks of life and normalcy of requesting pediatric weight checks as needed in between visits.     Hand Expression Education:  Mom taught how to This note was copied from a sibling's chart.  Lactation visit to discuss painful nipples. Mom has been using her [place] pump and she is pumping about 4-6 oz combined per session and usually every 2-3 hrs but closer to 2 hrs. She pumps for 25 minutes. She feels almost like a rubbing or friction kind of pain right at the nipple that is not radiating into breast. She states its during pumping and sometimes afterward. She denies redness, shiny nipples, and cracking.     Discussed flange sizing, currently using 27 mm flanges, and to look during pumping to make sure there isn't any friction or size is too big and pulling in too much breast tissue. Also discussed signs and symptoms of yeast and what to watch for, in addition to mastitis and that can occur right behind nipple at times.     Encouraged to assess her flange size, turn suction down, consider trying to cut back to 20 min instead of 25 min as body can now be regulated a little more. Purelan and gel pads given with instructions. Encouraged keeping open to air and will check in with Mom on Tuesday for an update. Mom very appreciative of help.    Time spent in room: 14 min  Chiquita Campos, RN, BSN, IBCLC      manually hand express her colostrum. Emphasized the importance of providing infant with valuable colostrum as infant rests skin to skin at breast.  Aware to avoid extended periods of non-feeding. Aware to offer 10-20+ drops of colostrum every 2-3 hours until infant is latching and nursing effectively. Taught the rationale behind this low tech but highly effective evidence based practice. Pt will successfully establish breastfeeding by feeding in response to early feeding cues or wake every 3h, will obtain deep latch, and will keep log of feedings/output. Taught to BF at hunger cues and or q 2-3 hrs and to offer 10-20 drops of hand expressed colostrum at any non-feeds.       Breast Assessment  Left Breast: Medium, Large  Left Nipple: Everted, Intact  Right Breast: Medium, Large  Right Nipple: Everted, Intact  Breast- Feeding Assessment  Attends Breast-Feeding Classes: No  Breast-Feeding Experience: Yes (3rd baby to BF)  Breast Trauma/Surgery: No  Type/Quality: Good  Lactation Consultant Visits  Breast-Feedings: Good  (per mother)  Mother/Infant Observation  Mother Observation: Breast comfortable  LATCH Documentation  Latch:  (did not see baby at breat today)

## 2021-07-08 NOTE — PROGRESS NOTES
0700: Bedside and Verbal shift change report given to EVELIA Benitez RN (oncoming nurse) by Elvis Arias RN (offgoing nurse). Report included the following information SBAR, Kardex, Intake/Output, MAR, Recent Results, Med Rec Status and Alarm Parameters . 1617: TRANSFER - OUT REPORT:    Verbal report given to ABNER Simpson RN(name) on Valdemar Bolus  being transferred to MIU(unit) for routine progression of care       Report consisted of patients Situation, Background, Assessment and   Recommendations(SBAR). Information from the following report(s) SBAR, Kardex, Intake/Output, MAR, Recent Results, Med Rec Status and Alarm Parameters  was reviewed with the receiving nurse. Lines:       Opportunity for questions and clarification was provided.       Patient transported with:   Registered Nurse

## 2021-07-08 NOTE — PROGRESS NOTES
0700:Bedside and Verbal shift change report given to EVELIA Benitez RN (oncoming nurse) by Alison Farnsworth RN (offgoing nurse). Report included the following information SBAR, Kardex, Intake/Output, MAR, Recent Results and Med Rec Status.

## 2021-07-08 NOTE — ROUTINE PROCESS
Bedside and Verbal shift change report given to ROSY Negrete RN (oncoming nurse) by Nayana Lo RN (offgoing nurse). Report included the following information SBAR, Kardex, Intake/Output, MAR and Recent Results.

## 2021-07-08 NOTE — L&D DELIVERY NOTE
Delivery Summary    Patient: Gina Ordoñez MRN: [id number]  SSN: [id number]    YOB: 1985  Age: 31 y.o. Sex: female       Information for the patient's :  JyotisonaliDonna Myers [[id number]]       Labor Events:    Labor: No    Steroids: None   Cervical Ripening Date/Time: 2021 6:30 PM   Cervical Ripening Type: Harris/EASI   Antibiotics During Labor: No   Rupture Identifier:      Rupture Date/Time: 2021 9:03 AM   Rupture Type: AROM   Amniotic Fluid Volume: Moderate    Amniotic Fluid Description: Clear    Amniotic Fluid Odor:      Induction: Harris Bulb (balloon); Oxytocin       Induction Date/Time: 2021 6:30 PM    Indications for Induction: Elective    Augmentation:     Augmentation Date/Time:      Indications for Augmentation:     Labor complications: None       Additional complications:        Delivery Events:  Indications For Episiotomy:     Episiotomy: None   Perineal Laceration(s): 2nd   Repaired:     Periurethral Laceration Location:      Repaired:     Labial Laceration Location:     Repaired:     Sulcal Laceration Location:     Repaired:     Vaginal Laceration Location:     Repaired:     Cervical Laceration Location:     Repaired:     Repair Suture: Vicryl 3-0   Number of Repair Packets:     Estimated Blood Loss (ml):  ml   Quantitative Blood Loss (ml)                Delivery Date: 2021    Delivery Time: 4:02 PM  Delivery Type: Vaginal, Spontaneous  Sex:  Male    Gestational Age: 43w3d   Delivery Clinician:  Abdi Limon  Living Status: Living   Delivery Location: L&D            APGARS  One minute Five minutes Ten minutes   Skin color: 1   1        Heart rate: 2   2        Grimace: 2   2        Muscle tone: 2   2        Breathin   2        Totals: 9   9            Presentation: Vertex    Position: Right Occiput Anterior  Resuscitation Method:  Suctioning-bulb; Tactile Stimulation     Meconium Stained: None      Cord Information: 3 Vessels  Complications: Nuchal Cord With Compressions  Cord around: head  Delayed cord clamping? Yes  Cord clamped date/time:2021  4:03 PM  Disposition of Cord Blood: Lab    Blood Gases Sent?: No    Placenta:  Date/Time: 2021  4:07 PM  Removal: Expressed      Appearance: Normal     [place] Measurements:  Birth Weight: 7 lb 13.8 oz (3.565 kg)      Birth Length: 1' 8\" (0.508 m)      Head Circumference: 1' 1.78\" (0.35 m)      Chest Circumference: 1' 1.39\" (0.34 m)     Abdominal Girth: 1' 0.6\" (0.32 m)    Other Providers:   CASSIDY TRINIDAD;VANDANA TRIANA;LU BUCHANAN, Obstetrician;Primary Nurse;Primary [place] Nurse           Group B Strep:   Lab Results   Component Value Date/Time    Qasimtrebecky, External NEGATIVE 2021 12:00 AM     Information for the patient's :  Saba Ortiz [[id number]]     Lab Results   Component Value Date/Time    ABO/Rh(D) O POSITIVE 2021 04:41 PM    NILA IgG NEG 2021 04:41 PM    Bilirubin if NILA pos: IF DIRECT CHEY POSITIVE, BILIRUBIN TO FOLLOW 2021 04:41 PM      No results for input(s): PCO2CB, PO2CB, HCO3I, SO2I, IBD, PTEMPI, SPECTI, PHICB, ISITE, IDEV, IALLEN in the last 72 hours.

## 2021-07-09 VITALS
RESPIRATION RATE: 16 BRPM | HEART RATE: 91 BPM | SYSTOLIC BLOOD PRESSURE: 125 MMHG | DIASTOLIC BLOOD PRESSURE: 87 MMHG | OXYGEN SATURATION: 95 % | TEMPERATURE: 98.4 F

## 2021-07-09 PROCEDURE — 74011250637 HC RX REV CODE- 250/637: Performed by: OBSTETRICS & GYNECOLOGY

## 2021-07-09 RX ADMIN — IBUPROFEN 800 MG: 800 TABLET ORAL at 02:33

## 2021-07-09 RX ADMIN — OXYCODONE HYDROCHLORIDE AND ACETAMINOPHEN 1 TABLET: 5; 325 TABLET ORAL at 06:24

## 2021-07-09 RX ADMIN — IBUPROFEN 800 MG: 800 TABLET ORAL at 11:19

## 2021-07-09 RX ADMIN — OXYCODONE HYDROCHLORIDE AND ACETAMINOPHEN 1 TABLET: 5; 325 TABLET ORAL at 02:33

## 2021-07-09 RX ADMIN — OXYCODONE HYDROCHLORIDE AND ACETAMINOPHEN 1 TABLET: 5; 325 TABLET ORAL at 11:25

## 2021-07-09 RX ADMIN — HYDROCODONE BITARTRATE AND ACETAMINOPHEN 1 TABLET: 5; 325 TABLET ORAL at 15:36

## 2021-07-09 NOTE — PROGRESS NOTES
Post-Partum Day Number 2 Progress Note    Myrtle Dancer     Information for the patient's :  Jorge Arguelles, Male Oswaldo Henry [[id number]]   Vaginal, Spontaneous    Patient doing well without significant complaint. Voiding without difficulty, normal lochia. Vitals:  Visit Vitals  /66   Pulse 84   Temp 98.1 °F (36.7 °C)   Resp 16   LMP 10/03/2020 (Exact Date)   SpO2 91%   Breastfeeding Unknown     Temp (24hrs), Av.2 °F (36.8 °C), Min:98 °F (36.7 °C), Max:98.6 °F (37 °C)      Exam:         Patient without distress. Abdomen soft, fundus firm, nontender                 ower extremities are negative for swelling, cords or tenderness.     Labs:     Lab Results   Component Value Date/Time    WBC 9.0 2021 07:09 PM    WBC 10.4 2021 10:36 AM    WBC 9.8 2020 12:32 PM    WBC 10.4 2018 03:55 PM    WBC 12.2 (H) 2018 04:00 PM    WBC 12.4 (H) 10/05/2017 03:51 PM    WBC 13.0 (H) 2016 05:00 PM    WBC 12.9 (H) 2016 02:18 PM    WBC 10.4 10/06/2015 12:08 PM    HGB 12.7 2021 07:09 PM    HGB 11.6 2021 10:36 AM    HGB 13.2 2020 12:32 PM    HGB 11.4 (L) 2018 03:55 PM    HGB 10.3 (L) 2018 04:00 PM    HGB 12.9 10/05/2017 03:51 PM    HGB 11.7 2016 05:00 PM    HGB 11.1 2016 02:18 PM    HGB 13.0 10/06/2015 12:08 PM    HCT 39.2 2021 07:09 PM    HCT 36.9 2021 10:36 AM    HCT 40.2 2020 12:32 PM    HCT 35.1 2018 03:55 PM    HCT 31.3 (L) 2018 04:00 PM    HCT 38.1 10/05/2017 03:51 PM    HCT 35.9 2016 05:00 PM    HCT 33.0 (L) 2016 02:18 PM    HCT 38.5 10/06/2015 12:08 PM    PLATELET 170  07:09 PM    PLATELET 910  10:36 AM    PLATELET 325  12:32 PM    PLATELET 950  03:55 PM    PLATELET 513  04:00 PM    PLATELET 098  03:51 PM    PLATELET 128  05:00 PM    PLATELET 034  02:18 PM    PLATELET 537  12:08 PM    Hgb, External 13.2 12/17/2020 12:00 AM    Hgb, External 10.3 02/07/2018 12:00 AM    Hgb, External 12.9 10/07/2017 12:00 AM    Hgb, External 13.0 10/06/2015 12:00 AM    Hct, External 40.2 12/17/2020 12:00 AM    Hct, External 31.3 02/07/2018 12:00 AM    Hct, External 38.1 10/07/2017 12:00 AM    Hct, External 38.5 10/06/2015 12:00 AM    Platelet cnt., External 379 02/07/2018 12:00 AM    Platelet cnt., External 344 10/07/2017 12:00 AM    Platelet cnt., External 345 10/06/2015 12:00 AM       No results found for this or any previous visit (from the past 24 hour(s)). Assessment: Doing well, post partum day 2    Plan:   1. Discharge home today  2. Follow up in office in 6 weeks with Samantha Nelson MD  3. Post partum activity advised, diet as tolerated  4.  Discharge Medications: ibuprofen, and medications prior to admission

## 2021-07-09 NOTE — ROUTINE PROCESS
Bedside shift change report given to Glen Vincent RN (oncoming nurse) by Jun Temple RN (offgoing nurse). Report included the following information SBAR, Kardex and MAR.

## 2021-07-09 NOTE — DISCHARGE INSTRUCTIONS
[facility]  [email or web address]  [phone]    Alisa Johns MD, FACOG     POST DELIVERY DISCHARGE INSTRUCTIONS  FROM YOUR PHYSICIAN    Name: Esperanza Burton  YOB: 1985    General:     Read all discharge information provided by the hospital    Diet/Diet Restrictions:  Eat healthy meals and snacks as desired. Eat foods that are high in fiber and low in fat and cholesterol. Drink eight 8-ounce glasses of water daily; avoid excessive caffeine intake. http://www.mamta-le.org/. html  EliteClients.be    Medications:   See discharge medication list and read instructions carefully. Breast Feeding:  See instructions from your lactation consult. Call [phone] for more information or to locate a lactation consultant. https://www.benito.info/    Vaccines:  If you received the MMR vaccine postpartum you should wait three months until you get pregnant again. You, and close contacts, should make sure that the Tdap vaccine is up to date. This vaccine can decrease the risk of your baby getting pertussis or \"whooping cough. \"    You, and close contacts, should receive the influenza vaccine during flu season when appropriate. SalaryStart.tn    Tobacco Use: If you (or other people around the baby) smoke or use tobacco products, please try to  use and quit to improve your health and decrease risk to your baby. LimitBuy.nl. htm    Swelling in your Legs:  There are many fluid changes after delivery and you may have more swelling the first few days after delivery  Continue to drink plenty of water, avoid sitting or standing in one position for too long and elevate your feet above your heart, to help reduce some the pressure you may be feeling in your ankles and legs.      Section Incision:  Steri-strips or tape strips may be removed gently at home approximately 7- 10 days after surgery. Soaking the strips with a warm, wet cloth or taking a shower may make the strips easier to remove. Metal staples are usually removed within 3 to 10 days, either before you leave the hospital or in the office. Make an appointment if needed. Insorb absorbable staples may be used under the skin but you may see small white pieces as they dissolve. Skin glue or dermabond will fall off with time. Abdominal incisions should be kept clean by showering. It is not necessary to put soap on the incision; plain tap water is adequate. Avoid scrubbing the area and pat dry. The way your scar looks will change over time and may not reach its final appearance for up to a year. The area may feel either numb or sensitive to touch, which is normal.         Physical Activity / Restrictions / Safety:     Avoid heavy lifting, no more that 10 pounds, for 2-3 weeks. No driving while taking narcotic pain medication, of if you can not slam on the brakes. No intercourse for 4-6 weeks, no douching or tampon use until seen by your doctor for your postpartum visit. Use condoms as needed for contraception with sexual activity. You may resume normal exercise after you are cleared by your physician at your postpartum check. You may walk for exercise, as tolerated. Discharge Instructions/Special Treatment/Home Care Needs:     Continue your prenatal vitamins while breast feeding or pumping. Continue to use a squirt bottle with warm water on your perineum/bottom/episiotomy after each bathroom use until bleeding stops. Take stool softeners daily. For example, docusate over the counter stool softener. This is especially important if you are taking narcotic pain medications, because they can cause constipation. Call your doctor for the following:      If you have a fever over 100.4 degrees by mouth on two readings. If you have persistent vaginal bleeding heavier than a heavy menstrual period or persistent large clots or if you are bleeding so heavy it is making you feel weak. It is normal to pass larger clots when you first get out of bed: but if they persist, notify your physician. If you have red streaks or increased swelling of legs, painful red streaks on your breast.  If you have painful urination, or increased pain, redness or discharge with your incision. If you have any questions or concerns. Pain Management:     Take Acetaminophen (Tylenol), Ibuprofen (Advil, Motrin), prescribed pain medications as directed for pain. Do not take Perocet with Tylenol, they both contain acetaminophen. Use a warm water Sitz bath 3 times daily to relieve episiotomy, bottom/perineum or hemorrhoidal discomfort. Apply heating pad to  incision as needed. For hemorrhoidal discomfort, you can use Tucks and Anusol cream as needed and directed.     NSAID information for patients:  [email or web address]    Pain medication/narcotic information for patients:   Take your medicine exactly as prescribed   Store your medicine away from children and in a safe  place   Do not give your medicine to others   Do not drink alcohol while taking this medicine    Follow-Up Care:     Appointment with MD:  Dr. Zaki Kim  [phone]  Schedule your postpartum visit for six weeks        Additional Discharge Instructions     Please read all of your discharge instructions   Follow all of your medication instructions carefully   Call our office on the next business day to schedule your follow-up appointment   If you have any questions or concerns, please contact us at [phone] or if the situation is urgent contact    Become a [facility] My Chart user so you can access information, results and appointments: go to [email or web address]. You may receive a survey about your delivery. Please take a minute to give us your feedback, and we hope that you were fully satisfied with your care. If you did not receive excellent communication, compassionate care and an outstanding patient experience, please notify Nayla Leal, the practice manger, or myself at [phone] or discuss your concerns with me at your next visit so that we can always meet our mission and your expectations. Thank you.   Dr. Derrell Montenegro MD  [address] [facility], [address]  [email or web address]   [phone]   Good Help to Those in [facility]

## 2021-07-09 NOTE — LACTATION NOTE
This note was copied from a baby's chart. Mom states nursing going well and she is feeding on demand to early cues of hunger. Mom plans to pump as well when she goes home. Engorgement precautions given. Chart shows numerous feedings, void, stool WNL. Discussed importance of monitoring outputs and feedings on first week of life. Discussed ways to tell if baby is  getting enough breast milk, ie  voids and stools, change in color of stool, and return to birth wt within 2 weeks. Follow up with pediatrician visit for weight check in 1-2 days (per AAP guidelines.)  Encouraged to call Warm Line  [phone]  for any questions/problems that arise. Mother also given breastfeeding support group dates and times for any future needs. Engorgement Care Guidelines:  Reviewed how milk is made and normal phases of milk production. Taught care of engorged breasts - frequent breastfeeding encouraged, cool packs and motrin as tolerated. Anticipatory guidance shared. Pt will successfully establish breastfeeding by feeding in response to early feeding cues or wake every 3h, will obtain deep latch, and will keep log of feedings/output. Taught to BF at hunger cues and or q 2-3 hrs and to offer 10-20 drops of hand expressed colostrum at any non-feeds.           Breast Assessment  Left Breast:  (breast exam deferred per mom)  Left Nipple: Everted, Intact  Right Breast: Medium, Large  Right Nipple: Everted, Intact  Breast- Feeding Assessment  Attends Breast-Feeding Classes: No  Breast-Feeding Experience: Yes (3rd baby to BF)  Breast Trauma/Surgery: No  Type/Quality: Good (per mother)  Lactation Consultant Visits  Breast-Feedings:  (did not see baby at breast, mother defers)  Mother/Infant Observation  Mother Observation: Breast comfortable  LATCH Documentation  Latch: Grasps breast, tongue down, lips flanged, rhythmic sucking  Audible Swallowing: A few with stimulation  Type of Nipple: Everted (after stimulation)  Comfort (Breast/Nipple): Soft/non-tender  Hold (Positioning): No assist from staff, mother able to position/hold infant  LATCH Score: 9

## 2021-07-09 NOTE — DISCHARGE SUMMARY
Obstetrical Discharge Summary     Name: Garth Santos MRN: [id number]  SSN: [id number]    YOB: 1985  Age: 31 y.o. Sex: female      Admit Date: 2021    Discharge Date: 2021     Admitting Physician: Ck Cruz MD     Attending Physician:  Bianca Maldonado MD     Admission Diagnoses: 39 weeks gestation of pregnancy [Z3A.39]    Condition on Discharge: Stable    Procedures:     Disposition: to home    Follow up: Follow-up Appointments   Procedures    FOLLOW UP VISIT Appointment in: 6 Weeks     Standing Status:   Standing     Number of Occurrences:   1     Order Specific Question:   Appointment in     Answer:   6 Weeks        Discharge Diagnoses:   Information for the patient's :  Hallie Emanuel, Male Jon Dixon [[id number]]   Delivery of a 7 lb 13.8 oz (3.565 kg) male infant via Vaginal, Spontaneous on 2021 at 4:02 PM  by Janith Fabry. Apgars were 9  and 9 . Additional Diagnoses:   Hospital Problems  Date Reviewed: 2021        Codes Class Noted POA    39 weeks gestation of pregnancy ICD-10-CM: Z3A.39  ICD-9-CM: V22.2  2021 Unknown             Lab Results   Component Value Date/Time    Rubella, External immune 2020 12:00 AM    GrBStrep, External NEGATIVE 2021 12:00 AM       Hospital Course: Normal hospital course following the delivery. Patient Instructions:   Current Discharge Medication List      START taking these medications    Details   ibuprofen (MOTRIN) 600 mg tablet Take 1 Tablet by mouth every six (6) hours as needed for Pain. Take with food. Qty: 30 Tablet, Refills: 0         CONTINUE these medications which have NOT CHANGED    Details   diphenhydrAMINE (BenadryL) 25 mg capsule Take 25 mg by mouth every six (6) hours as needed. pseudoephedrine (Sudafed) 30 mg tablet Take  by mouth every four (4) hours as needed for Congestion.       Synthroid 150 mcg tablet 1 tab PO before breakfast  Qty: 90 Tab, Refills: 3    Associated Diagnoses: Postablative hypothyroidism      PNV Comb #2-Iron-FA-Omega 3 29-1-400 mg cmpk Take  by mouth. cholecalciferol (VITAMIN D3) (5000 Units/125 mcg) tab tablet Take 10,000 Units by mouth every seven (7) days. Reference my discharge instructions.       Signed By:  Zaki Kim MD     July 8, 2021

## 2021-07-15 NOTE — ADT AUTH CERT NOTES
We have not received an update for this case. Please contact me at your earliest convenience with total number of approved days. Thank you so much! Thank you,   Jeramie Izquierdo. [address]   [address] [place])     F: [phone]    REF# [id number]     DISCHARGE SUMMARY:       Comments  Comment     Last edited by  on  at    Patient Demographics    Patient Name   Mikey Gerard   [id number] Legal Sex   Female    1985 Address   [address] [place], [place] [address] Phone   [phone] (Home)   [phone] (Work)   [phone] (Mobile) *Preferred*   Discharge Information    Discharge Provider Date/Time Disposition Michelle Edward MD / [phone] 21 1617 Home or [address]   Comments   (none)   Discharge Summary Notes    Discharge Summary by Xavier Pineda MD at 21  Version 1 of 1  Author: Xavier Pineda MD Service: Obstetrics & Gynecology Author Type: Physician   Filed: 21 Date of Service: 21 Status: Signed   : Xavier Pineda MD (Physician)      Obstetrical Discharge Summary      Name: Mounika Bo MRN: [id number]  SSN: [id number]    YOB: 1985  Age: 31 y.o. Sex: female       Admit Date: 2021     Discharge Date: 2021      Admitting Physician: Cassandra Torres MD      Attending Physician:  Xavier Pineda MD      Admission Diagnoses: 39 weeks gestation of pregnancy [Z3A.39]     Condition on Discharge: Stable     Procedures:      Disposition: to home     Follow up:          Follow-up Appointments   Procedures    FOLLOW UP VISIT Appointment in: 6 Weeks       Standing Status:   Standing       Number of Occurrences:   1       Order Specific Question:   Appointment in       Answer:   6 Weeks         Discharge Diagnoses:   Information for the patient's :  Thomas Alfaro, Male Kevin Beatty [[id number]]   Delivery of a 7 lb 13.8 oz (3.565 kg) male infant via Vaginal, Spontaneous on 7/7/2021 at 4:02 PM  by Osvaldo Martinez. Apgars were 9  and 9 .        Additional Diagnoses:              Hospital Problems  Date Reviewed: 7/6/2021         Codes Class Noted POA     39 weeks gestation of pregnancy ICD-10-CM: Z3A.39  ICD-9-CM: V22.2   7/6/2021 Unknown                      Lab Results   Component Value Date/Time     Rubella, External immune 12/17/2020 12:00 AM     GrBStrep, External NEGATIVE 06/23/2021 12:00 AM         Hospital Course: Normal hospital course following the delivery.     Patient Instructions:        Current Discharge Medication List            START taking these medications     Details   ibuprofen (MOTRIN) 600 mg tablet Take 1 Tablet by mouth every six (6) hours as needed for Pain. Take with food.   Qty: 30 Tablet, Refills: 0                CONTINUE these medications which have NOT CHANGED     Details   diphenhydrAMINE (BenadryL) 25 mg capsule Take 25 mg by mouth every six (6) hours as needed.       pseudoephedrine (Sudafed) 30 mg tablet Take  by mouth every four (4) hours as needed for Congestion.       Synthroid 150 mcg tablet 1 tab PO before breakfast  Qty: 90 Tab, Refills: 3     Associated Diagnoses: Postablative hypothyroidism       PNV Comb #2-Iron-FA-Omega 3 29-1-400 mg cmpk Take  by mouth.       cholecalciferol (VITAMIN D3) (5000 Units/125 mcg) tab tablet Take 10,000 Units by mouth every seven (7) days.                 Reference my discharge instructions.        Signed By:  Hernesto Colmenares MD      July 8, 2021

## 2021-09-13 ENCOUNTER — OFFICE VISIT (OUTPATIENT)
Dept: OBGYN CLINIC | Age: 36
End: 2021-09-13
Payer: COMMERCIAL

## 2021-09-13 VITALS
SYSTOLIC BLOOD PRESSURE: 120 MMHG | BODY MASS INDEX: 36.81 KG/M2 | HEART RATE: 99 BPM | DIASTOLIC BLOOD PRESSURE: 76 MMHG | WEIGHT: 182.6 LBS | HEIGHT: 59 IN

## 2021-09-13 PROCEDURE — 0503F POSTPARTUM CARE VISIT: CPT | Performed by: OBSTETRICS & GYNECOLOGY

## 2021-09-13 RX ORDER — AZITHROMYCIN 250 MG/1
TABLET, FILM COATED ORAL
COMMUNITY
Start: 2021-07-20 | End: 2021-09-13 | Stop reason: SDUPTHER

## 2021-09-13 RX ORDER — AZITHROMYCIN 250 MG/1
TABLET, FILM COATED ORAL
Qty: 6 TABLET | Refills: 0 | Status: SHIPPED | OUTPATIENT
Start: 2021-09-13 | End: 2021-10-18 | Stop reason: ALTCHOICE

## 2021-09-13 NOTE — PROGRESS NOTES
Cedrick Villalobos MD, [facility]     Postpartum visit    Chief Complaint   Patient presents with   Aurora Jennifer is a 31 y.o. female G3 31 17 09 who presents for a postpartum exam.     She is now 9 weeks from a vaginal delivery delivery. No LMP recorded. She has had the following significant problems since her delivery: she has a sinus infection; c/o congestion, inus pressure, headaches, thick nose mucus; her PCP can't see her for a week; wants a Z-westley if Dr. Davida Dominguez can give her one. She has been having this sinus infection since delivery intermittently. Was given a zpak by PCP after delivery and it helped. She reports her mood as Good. The patient is breastfeeding/pumping breast milk for her baby. The patient would like to use nothing for birth control. She is due for her next AE in 3 months. Past Medical History:   Diagnosis Date    Abnormal Pap smear of cervix 10/05/2017; 10/30/18    Negative, HR HPV+, 16+, 18 negative; TONE/Neg HPV    Anxiety     Arthritis     Bursitis     and scapulothoracic dyskinesia, right shoulder    Fatigue     Headache     History of colposcopy 12/21/2018    Benign    History of endometrial biopsy 12/21/2018    Benign    Joint pain     Kidney stones     Muscle pain     Pap smear for cervical cancer screening 05/14/2014; 11/4/2019    negative; negative, HPV negative    Psychiatric problem     Anxiety    Shoulder bursitis     right    Thyroid cancer ([address] 1/10/2013    Complete Thyroidectomy 2012. Past Surgical History:   Procedure Laterality Date    HX COLPOSCOPY  11/2017    HX LITHOTRIPSY      HX ORTHOPAEDIC Left 2007    metal plate/ 7screwes in Left wrist; Broken ulna and radius.     HX THYROIDECTOMY  10/2010    total     Current Outpatient Medications   Medication Sig    azithromycin (ZITHROMAX) 250 mg tablet TAKE 2 TABLETS BY MOUTH ON DAY 1 AND THEN TAKE 1 TABLET BY MOUTH ONCE A DAY ON DAY 2 THROUGH DAY 5    ibuprofen (MOTRIN) 600 mg tablet Take 1 Tablet by mouth every six (6) hours as needed for Pain. Take with food.  diphenhydrAMINE (BenadryL) 25 mg capsule Take 25 mg by mouth every six (6) hours as needed.  Synthroid 150 mcg tablet 1 tab PO before breakfast    PNV Comb #2-Iron-FA-Omega 3 29-1-400 mg cmpk Take  by mouth.  cholecalciferol (VITAMIN D3) (5000 Units/125 mcg) tab tablet Take 10,000 Units by mouth every seven (7) days.  pseudoephedrine (Sudafed) 30 mg tablet Take  by mouth every four (4) hours as needed for Congestion. (Patient not taking: Reported on 9/13/2021)     No current facility-administered medications for this visit. Allergies   Allergen Reactions    Amoxicillin Hives     Family History   Problem Relation Age of Onset    Elevated Lipids Mother     Heart Disease Father     Cancer Other     Headache Other     Dementia Other     MS Other     Parkinson's Disease Other     Breast Cancer Other         paternal great aunt    Breast Cancer Paternal Grandmother     Heart Disease Maternal Grandmother     Heart Disease Maternal Grandfather     MS Maternal Aunt      Social History     Socioeconomic History    Marital status:      Spouse name: Not on file    Number of children: Not on file    Years of education: Not on file    Highest education level: Not on file   Occupational History    Not on file   Tobacco Use    Smoking status: Former Smoker    Smokeless tobacco: Never Used    Tobacco comment: Never used vapor or e-cigs    Substance and Sexual Activity    Alcohol use:  Yes     Alcohol/week: 0.0 standard drinks     Comment: occasional    Drug use: No     Types: Marijuana     Comment: pt smoked marijuana in the past    Sexual activity: Yes     Partners: Male     Birth control/protection: Condom   Other Topics Concern     Service Not Asked    Blood Transfusions Not Asked    Caffeine Concern Not Asked    Occupational Exposure Not Asked   Cordelia Schrader Hazards Not Asked    Sleep Concern Not Asked    Stress Concern Not Asked    Weight Concern Not Asked    Special Diet Not Asked    Back Care Not Asked    Exercise Not Asked    Bike Helmet Not Asked    [address] Not Asked    Self-Exams Not Asked   Social History Narrative    Not on file     Social Determinants of Health     Financial Resource Strain:     Difficulty of Paying Living Expenses:    Food Insecurity:     Worried About Running Out of Food in the Last Year:     [address] in the Last Year:    Transportation Needs:     Lack of Transportation (Medical):  Lack of Transportation (Non-Medical):    Physical Activity:     Days of Exercise per Week:     Minutes of Exercise per Session:    Stress:     Feeling of Stress :    Social Connections:     Frequency of Communication with Friends and Family:     Frequency of Social Gatherings with Friends and Family:     Attends Jew Services:     Active Member of Clubs or Organizations:     Attends Club or Organization Meetings:     Marital Status:    Intimate Partner Violence:     Fear of Current or Ex-Partner:     Emotionally Abused:     Physically Abused:     Sexually Abused:      OB History        3    Para   3    Term   3       0    AB   0    Living   3       SAB   0    TAB   0    Ectopic   0    Molar        Multiple   0    Live Births   3          Obstetric Comments   Pushed four hour hours. VAVD x1 no pop off   Dr. Ely NIX   5/15/2018 TP, IOL for post dates and ? SROM, slow progress, NC x1 with  after pushing 1.5 hours             Immunization History   Administered Date(s) Administered    Influenza Vaccine (>6 mo Afluria QUAD Vial [id number] (0.25 mL) / [id number] (0.5 mL)) 2015    Influenza Vaccine [place]) PF (>6 Mo Flulaval, Fluarix, and >3 Yrs [address] [place] [address], [facility] [id number]) 2019, 2020    Tdap 02/10/2016, 2018, 2021       Review of Systems:  History obtained from the patient  General ROS: negative for - chills, fever or weight loss  Respiratory ROS: no cough, shortness of breath, or wheezing  Cardiovascular ROS: no chest pain or dyspnea on exertion  Gastrointestinal ROS: negative for - appetite loss, change in bowel habits or nausea/vomiting  Genito-Urinary ROS: negative except for as noted in HPI    PHYSICAL EXAMINATION  Visit Vitals  /76   Pulse 99   Ht 4' 11\" (1.499 m)   Wt 182 lb 9.6 oz (82.8 kg)   Breastfeeding Yes   BMI 36.88 kg/m²       Constitutional  · Appearance: well-nourished, well developed, alert, in no acute distress    HENT  · Head and Face: appears normal    Neck  · Inspection/Palpation: normal appearance, no masses or tenderness  · Lymph Nodes: no lymphadenopathy present  · Thyroid: gland size normal, nontender, no nodules or masses present on palpation    Chest  clear to auscultation, no wheezes, rales or rhonchi, symmetric air entry. Cardiac/CVS  normal rate, regular rhythm, normal S1, S2, no murmurs, rubs, clicks or gallops.     Breasts  · Inspection of Breasts: breasts symmetrical, no skin changes, no discharge present, nipple appearance normal, no skin retraction present  · Palpation of Breasts and Axillae: no masses present on palpation, no breast tenderness  · Axillary Lymph Nodes: no lymphadenopathy present    Gastrointestinal  · Abdominal Examination: abdomen non-tender to palpation, normal bowel sounds, no masses present  · Liver and spleen: no hepatomegaly present, spleen not palpable  · Hernias: no hernias identified    Genitourinary  · External Genitalia: normal appearance for age, no discharge present, no tenderness present, no inflammatory lesions present, no masses present, no atrophy present  · Vagina: normal vaginal vault without central or paravaginal defects, no discharge present, no inflammatory lesions present, no masses present  · Bladder: non-tender to palpation  · Urethra: appears normal  · Cervix: normal   · Uterus: normal size, shape and consistency  · Adnexa: no adnexal tenderness present, no adnexal masses present  · Perineum: perineum within normal limits, no evidence of trauma, no rashes or skin lesions present  · Anus: anus within normal limits, ext hemorrhoids  · Inguinal Lymph Nodes: no lymphadenopathy present    Skin  · General Inspection: no rash, no lesions identified    Neurologic/Psychiatric  · Mental Status:  · Orientation: grossly oriented to person, place and time  · Mood and Affect: mood normal, affect appropriate    Assessment:  Normal postpartum check  Encounter Diagnosis   Name Primary?  Postpartum exam Yes       Plan:  RTO for AE or sooner prn  Discussed contraception options; r/b/a. Planned contraception: declined. She should return to normal activity  We recommend healthy balanced diet, regular exercise  We discussed safer sex practices, condom use and risk factors for sexually transmitted diseases.    Patient should notify MD if she cannot resume normal activity and exercise  Recommended continuing prenatal vitamins/folic acid  Rec PCP for sinus sx but will treat with abx to avoid urgent care with  during a pandemic  FU PCP if NI or sx recur  Disc colorectal consult for hemorrhoid management

## 2021-09-13 NOTE — PATIENT INSTRUCTIONS
Postpartum: Care Instructions  Your Care Instructions  After childbirth (postpartum period), your body goes through many changes. Some of these changes happen over several weeks. In the hours after delivery, your body will begin to recover from childbirth while it prepares to breastfeed your . You may feel emotional during this time. Your hormones can shift your mood without warning for no clear reason. In the first couple of weeks after childbirth, many women have emotions that change from happy to sad. You may find it hard to sleep. You may cry a lot. This is called the \"baby blues. \" These overwhelming emotions often go away within a couple of days or weeks. But it's important to discuss your feelings with your doctor. It is easy to get too tired and overwhelmed during the first weeks after childbirth. Don't try to do too much. Get rest whenever you can, accept help from others, and eat well and drink plenty of fluids. In the first couple of weeks after giving birth, your doctor or midwife may want to check in with you and make a plan for any follow-up care you may need. You will likely have a complete postpartum visit in the first 3 months after delivery. At that time, your doctor or midwife will check on your recovery from childbirth. He or she will also see how you are doing with your emotions and talk about your concerns or questions. Follow-up care is a key part of your treatment and safety. Be sure to make and go to all appointments, and call your doctor if you are having problems. It's also a good idea to know your test results and keep a list of the medicines you take. How can you care for yourself at home? · Sleep or rest when your baby sleeps. · Get help with household chores from family or friends, if you can. Do not try to do it all yourself. · If you have hemorrhoids or swelling or pain around the opening of your vagina, try using cold and heat.  You can put ice or a cold pack on the area for 10 to 20 minutes at a time. Put a thin cloth between the ice and your skin. Also try sitting in a few inches of warm water (sitz bath) 3 times a day and after bowel movements. · Take pain medicines exactly as directed. ? If the doctor gave you a prescription medicine for pain, take it as prescribed. ? If you are not taking a prescription pain medicine, ask your doctor if you can take an over-the-counter medicine. · Eat more fiber to avoid constipation. Include foods such as whole-grain breads and cereals, raw vegetables, raw and dried fruits, and beans. · Drink plenty of fluids. If you have kidney, heart, or liver disease and have to limit fluids, talk with your doctor before you increase the amount of fluids you drink. · Do not rinse inside your vagina with fluids (douche). · If you have stitches, keep the area clean by pouring or spraying warm water over the area outside your vagina and anus after you use the toilet. · Keep a list of questions to ask your doctor or midwife. Your questions might be about:  ? Changes in your breasts, such as lumps or soreness. ? When to expect your menstrual period to start again. ? What form of birth control is best for you. ? Weight you have put on during the pregnancy. ? Exercise options. ? What foods and drinks are best for you, especially if you are breastfeeding. ? Problems you might be having with breastfeeding. ? When you can have sex. Some women may want to talk about lubricants for the vagina. ? Any feelings of sadness or restlessness that you are having. When should you call for help? Call 911 anytime you think you may need emergency care. For example, call if:    · You have thoughts of harming yourself, your baby, or another person.     · You passed out (lost consciousness).     · You have chest pain, are short of breath, or cough up blood.     · You have a seizure.    Call your doctor now or seek immediate medical care if:    · Your vaginal bleeding seems to be getting heavier.     · You are dizzy or lightheaded, or you feel like you may faint.     · You have a fever.     · You have new or more belly pain.     · You have symptoms of a blood clot in your leg (called a deep vein thrombosis), such as:  ? Pain in the calf, back of the knee, thigh, or groin. ? Redness and swelling in your leg or groin.     · You have signs of preeclampsia, such as:  ? Sudden swelling of your face, hands, or feet. ? New vision problems (such as dimness, blurring, or seeing spots). ? A severe headache. Watch closely for changes in your health, and be sure to contact your doctor if:    · You have new or worse vaginal discharge.     · You feel sad or depressed.     · You are having problems with your breasts or breastfeeding. Where can you learn more? Go to http://www.gray.com/  Enter C198 in the search box to learn more about \"Postpartum: Care Instructions. \"  Current as of: October 8, 2020               Content Version: 12.8  © 2006-2021 [employer]. Care instructions adapted under license by [employer] (which disclaims liability or warranty for this information). If you have questions about a medical condition or this instruction, always ask your healthcare professional. [address] any warranty or liability for your use of this information.

## 2021-10-18 ENCOUNTER — OFFICE VISIT (OUTPATIENT)
Dept: ENDOCRINOLOGY | Age: 36
End: 2021-10-18
Payer: COMMERCIAL

## 2021-10-18 VITALS
WEIGHT: 175 LBS | HEIGHT: 59 IN | RESPIRATION RATE: 20 BRPM | BODY MASS INDEX: 35.28 KG/M2 | OXYGEN SATURATION: 99 % | DIASTOLIC BLOOD PRESSURE: 65 MMHG | TEMPERATURE: 98 F | SYSTOLIC BLOOD PRESSURE: 110 MMHG | HEART RATE: 79 BPM

## 2021-10-18 DIAGNOSIS — E89.0 POSTABLATIVE HYPOTHYROIDISM: ICD-10-CM

## 2021-10-18 DIAGNOSIS — C73 THYROID CANCER (HCC): Primary | ICD-10-CM

## 2021-10-18 DIAGNOSIS — E53.8 VITAMIN B12 DEFICIENCY: ICD-10-CM

## 2021-10-18 DIAGNOSIS — E55.9 VITAMIN D DEFICIENCY: ICD-10-CM

## 2021-10-18 PROCEDURE — 99214 OFFICE O/P EST MOD 30 MIN: CPT | Performed by: INTERNAL MEDICINE

## 2021-10-18 RX ORDER — LEVOTHYROXINE SODIUM 150 MCG
TABLET ORAL
Qty: 90 TABLET | Refills: 3 | Status: SHIPPED | OUTPATIENT
Start: 2021-10-18 | End: 2022-03-24 | Stop reason: SDUPTHER

## 2021-10-18 NOTE — PROGRESS NOTES
Bianca Mcdonald MD                Patient Information  Date:10/18/2021  Name : Dylan Calhoun 31 y.o.     YOB: 1985           History of present illness    Dylan Calhoun is a 31 y.o. female  here for follow-up of thyroid cancer  Papillary thyroid carcinoma T3N1 2.1 cm, status post total thyroidectomy at [facility] by Dr. Tanmay Alexander, in October 2012. She had metastatic carcinoma in 6 of 7 lymph nodes level VI nodes with vascular and lymphatic invasion. 2 parathyroid glands were removed right inferior and right superior. Thyrogen stimulated ablation February 2012 with 167 mCi with a TG of 3.6. Neck ultrasound in October was negative, initial TG was 0.8, increased to 1.1. Thyrogen stimulated TG was 40 with a negative whole-body scan. She had neck ultrasound at [facility] in December which was negative for recurrent disease. PET scan showed uptake in one axillary node in the mediastinum. She saw Dr. Jurgen Adams at [facility],     November 2011: TSH 1.3, TG 6.6 (postop, PreI-131)  May 31, 2012, TSH less than 0.01, TG 0.8, TG antibody less than 20  October 16, 2012: TG 1.1, TG antibody less than 20, TSH 0.042  November 30, 2012: Thyrogen stimulated TG 40, TG antibody less than 20  January 18, 2013: TG 1.5, TG antibody less than 20, TSH 0.019 (on Synthroid 125 mcg)  April 2013: TG 1.5, TSH 0.45  February 2015: TG 2.2, TSH 0.3  September 2015: TSH 0.08, TG 4.6  2016: TG 6.8, TSH 0.495    Thyrogen whole-body scan: January 31, 2012  Anterior and lateral pinhole images of the neck reveal 3 small round foci of tracer uptake within the neck. One in the right neck, adjacent to the midline and 2 in the left neck located to the cephalad and lateral to the midline    Thyrogen whole-body scan iodine-131 treatment 167 mCi February 2012  Post iodine-131 treatment whole-body scan radioiodine avid thyroid tissue confined to the neck.   No imaging evidence of distant metastatic cancer. TG was 3.6  PET/CT 12/14/2012  0.7 cm right axillary lymph node exhibits increased radiotracer activity. Small lymph nodes in the axillary are normal.  Soft tissue in the anterior mediastinum is more prominent than expected for a patient of this age. There is a focus of increased tracer activity in the anterior mediastinal soft tissue on the right with a max SUV of 4.8. Soft tissue in the left aspect of the anterior mediastinum exhibits a max SUV of 3.8. No mediastinal lymphadenopathy. Impression soft tissue in the anterior mediastinum with increased metabolic activity. Right axillary lymph node with increased metabolic activity. Metastatic disease cannot be excluded. No abnormal activity in the expected location of the thyroid gland    CT chest January 2013  Anterior mediastinal opacities as described on the left rather than right. Left-sided lesion is slightly smaller than the CT images of the PET scan December 2012. The etiology of the posterior in the right anterior mediastinum is indeterminate. It could be thymic tissue. There are 2 soft tissue densities extending inferiorly along the great vessel to form a band of opacity which corresponds to the thickened pericardial surface adjacent to the great vessels. Should be noted that neither the right-sided focus in the anterior mediastinum not apparent pericardial thickening was identified on the recent PET scan. Scattered tiny nodular opacities majority of which are peripheral distribution and probably reflect small lymph nodes. She is on 150 mcg the longest, celiac was negative in the past    She is postpartum, delivered July 2021  She is taking Synthroid consistently,    No change in the size of the neck or neck pain. No dysphagia,dysphonia or dyspnea.       No family hx     Wt Readings from Last 3 Encounters:   10/18/21 175 lb (79.4 kg)   09/13/21 182 lb 9.6 oz (82.8 kg)   07/06/21 207 lb (93.9 kg)       Past Medical History:   Diagnosis Date    Abnormal Pap smear of cervix 10/05/2017; 10/30/18    Negative, HR HPV+, 16+, 18 negative; TONE/Neg HPV    Anxiety     Arthritis     Bursitis     and scapulothoracic dyskinesia, right shoulder    Fatigue     Headache     History of colposcopy 12/21/2018    Benign    History of endometrial biopsy 12/21/2018    Benign    Joint pain     Kidney stones     Muscle pain     Pap smear for cervical cancer screening 05/14/2014; 11/4/2019    negative; negative, HPV negative    Psychiatric problem     Anxiety    Shoulder bursitis     right    Thyroid cancer ([facility] 1/10/2013    Complete Thyroidectomy 2012. Current Outpatient Medications   Medication Sig    ibuprofen (MOTRIN) 600 mg tablet Take 1 Tablet by mouth every six (6) hours as needed for Pain. Take with food.  diphenhydrAMINE (BenadryL) 25 mg capsule Take 25 mg by mouth every six (6) hours as needed.  Synthroid 150 mcg tablet 1 tab PO before breakfast    PNV Comb #2-Iron-FA-Omega 3 29-1-400 mg cmpk Take  by mouth.  cholecalciferol (VITAMIN D3) (5000 Units/125 mcg) tab tablet Take 10,000 Units by mouth every seven (7) days. No current facility-administered medications for this visit. Allergies   Allergen Reactions    Amoxicillin Hives         Review of Systems: Per HPI    Physical Examination:  Blood pressure 110/65, pulse 79, temperature 98 °F (36.7 °C), temperature source Temporal, resp. rate 20, height 4' 11\" (1.499 m), weight 175 lb (79.4 kg), SpO2 99 %, currently breastfeeding. General: pleasant, no distress, good eye contact  HEENT: no exophthalmos, no periorbital edema, EOMI  Neck: No visible thyromegaly  RS: Normal respiratory effort  Musculoskeletal: no tremors  Neurological: alert and oriented  Psychiatric: normal mood and affect  Skin: Normal color  Data Reviewed:     [x] Reviewed labs      Assessment/Plan:     1.  Thyroid cancer ([facility]        Metastatic papillary thyroid cancer status post total thyroidectomy in 2012 at [address]  iodine ablation 2012 with 167 mCi, with Tg of 3.6  She has TG positive, scan negative disease without any radiological evidence. TG November 2019 less than 5, stable  Ultrasound neck May 2020 - neg  Thyroglobulin pending, thyroglobulin antibody negative    Post ablation hypothyroidism  Synthroid 1 tablet Monday through Saturday, 1.5 Sunday   Decreased Synthroid to 1 tablet daily    Vitamin D deficiency: 10,000 units week      There are no Patient Instructions on file for this visit. Patient /caregiver verbalized understanding   Voice-recognition software was used to generate this report, which may result in some phonetic-based errors in the grammar and contents. Even though attempts were made to correct all the mistakes, some may have been missed and remained in the body of the report.

## 2021-10-18 NOTE — PROGRESS NOTES
Tanja Singleton is a 31 y.o. female here for   Chief Complaint   Patient presents with    Thyroid Problem       1. Have you been to the ER, urgent care clinic since your last visit? Hospitalized since your last visit? -no    2. Have you seen or consulted any other health care providers outside of the [address] [place] [address] since your last visit?   Include any pap smears or colon screening.-no

## 2021-10-19 LAB
25(OH)D3+25(OH)D2 SERPL-MCNC: 39.1 NG/ML (ref 30–100)
T3 SERPL-MCNC: 116 NG/DL (ref 71–180)
THYROGLOB AB SERPL-ACNC: <1 IU/ML (ref 0–0.9)
TSH SERPL DL<=0.005 MIU/L-ACNC: 0.04 UIU/ML (ref 0.45–4.5)
VIT B12 SERPL-MCNC: 968 PG/ML (ref 232–1245)

## 2021-10-27 DIAGNOSIS — C73 RECURRENT THYROID CANCER (HCC): Primary | ICD-10-CM

## 2021-10-27 LAB — THYROGLOB SERPL-MCNC: 10 NG/ML

## 2021-11-08 ENCOUNTER — HOSPITAL ENCOUNTER (OUTPATIENT)
Dept: ULTRASOUND IMAGING | Age: 36
Discharge: HOME | End: 2021-11-08
Attending: INTERNAL MEDICINE
Payer: COMMERCIAL

## 2021-11-08 ENCOUNTER — HOSPITAL ENCOUNTER (OUTPATIENT)
Dept: CT IMAGING | Age: 36
Discharge: HOME | End: 2021-11-08
Attending: INTERNAL MEDICINE
Payer: COMMERCIAL

## 2021-11-08 DIAGNOSIS — C73 RECURRENT THYROID CANCER (HCC): ICD-10-CM

## 2021-11-08 PROCEDURE — 74011000636 HC RX REV CODE- 636: Performed by: RADIOLOGY

## 2021-11-08 PROCEDURE — 71260 CT THORAX DX C+: CPT

## 2021-11-08 PROCEDURE — 76536 US EXAM OF HEAD AND NECK: CPT

## 2021-11-08 RX ADMIN — IOPAMIDOL 100 ML: 612 INJECTION, SOLUTION INTRAVENOUS at 08:31

## 2021-11-12 DIAGNOSIS — C73 THYROID CANCER (HCC): Primary | ICD-10-CM

## 2021-11-20 ENCOUNTER — HOSPITAL ENCOUNTER (OUTPATIENT)
Dept: PET IMAGING | Age: 36
Discharge: HOME | End: 2021-11-20
Payer: COMMERCIAL

## 2021-11-20 DIAGNOSIS — C73 THYROID CANCER (HCC): ICD-10-CM

## 2021-11-20 PROCEDURE — A9552 F18 FDG: HCPCS

## 2021-11-22 RX ORDER — FLUDEOXYGLUCOSE F-18 200 MCI/ML
10.36 INJECTION INTRAVENOUS ONCE
Status: COMPLETED | OUTPATIENT
Start: 2021-11-22 | End: 2021-11-22

## 2021-11-22 RX ADMIN — FLUDEOXYGLUCOSE F-18 10.36 MILLICURIE: 200 INJECTION INTRAVENOUS at 09:00

## 2021-11-23 ENCOUNTER — DOCUMENTATION ONLY (OUTPATIENT)
Dept: ENDOCRINOLOGY | Age: 36
End: 2021-11-23

## 2021-11-23 DIAGNOSIS — C73 THYROID CANCER (HCC): Primary | ICD-10-CM

## 2021-11-23 NOTE — PROGRESS NOTES
Discussed PET/CT results with radiologist as well as patient  The last CT chest was in 2013 at [facility], there was some nodular opacities then, I do not have the imaging, she had a PET/CT in 2012 either at [facility] are [facility]. Small nodular densities in the lung, indeterminate, too small to biopsy  Discussed with the patient to call [facility] and see if she can get the images on a CD sent to her so we can compare it here with old images.     Enhancing lesion on the liver on CT has no FDG uptake on PET, differential hemangioma, focal nodular hyperplasia, ultrasound liver    She got IV contrast beginning of November 2021, cannot do radioactive iodine whole-body scan for another 2 months

## 2021-12-09 ENCOUNTER — VIRTUAL VISIT (OUTPATIENT)
Dept: ENDOCRINOLOGY | Age: 36
End: 2021-12-09
Payer: COMMERCIAL

## 2021-12-09 DIAGNOSIS — M25.50 ARTHRALGIA, UNSPECIFIED JOINT: Primary | ICD-10-CM

## 2021-12-09 DIAGNOSIS — M35.9 AUTOIMMUNE DISEASE (HCC): ICD-10-CM

## 2021-12-09 DIAGNOSIS — C73 RECURRENT THYROID CANCER (HCC): ICD-10-CM

## 2021-12-09 PROCEDURE — 99215 OFFICE O/P EST HI 40 MIN: CPT | Performed by: INTERNAL MEDICINE

## 2021-12-09 NOTE — PROGRESS NOTES
Gladys Doe MD           Patient Information  Date:12/9/2021  Name : Connie Mora 31 y.o.     YOB: 1985           History of present illness    Connie Mora is a 31 y.o. female  here for follow-up of thyroid cancer  Papillary thyroid carcinoma T3N1 2.1 cm, status post total thyroidectomy at [facility] by Dr. Anupama Lopez, in October 2011. She had metastatic carcinoma in 6 of 7 lymph nodes level VI nodes with vascular and lymphatic invasion. 2 parathyroid glands were removed right inferior and right superior. Thyrogen stimulated ablation February 2012 with 167 mCi with a TG of 3.6. Neck ultrasound in October was negative, initial TG was 0.8, increased to 1.1. Thyrogen stimulated TG was 40 with a negative whole-body scan. She had neck ultrasound at [facility] in December which was negative for recurrent disease. PET scan showed uptake in one axillary node in the mediastinum. She saw Dr. Jennifer Cha at [facility],     November 2011: TSH 1.3, TG 6.6 (postop, PreI-131)  May 31, 2012, TSH less than 0.01, TG 0.8, TG antibody less than 20  October 16, 2012: TG 1.1, TG antibody less than 20, TSH 0.042  November 30, 2012: Thyrogen stimulated TG 40, TG antibody less than 20  January 18, 2013: TG 1.5, TG antibody less than 20, TSH 0.019 (on Synthroid 125 mcg)  April 2013: TG 1.5, TSH 0.45  February 2015: TG 2.2, TSH 0.3  September 2015: TSH 0.08, TG 4.6  2016: TG 6.8, TSH 0.495    Thyrogen whole-body scan: January 31, 2012  Anterior and lateral pinhole images of the neck reveal 3 small round foci of tracer uptake within the neck. One in the right neck, adjacent to the midline and 2 in the left neck located to the cephalad and lateral to the midline    Thyrogen whole-body scan iodine-131 treatment 167 mCi February 2012  Post iodine-131 treatment whole-body scan radioiodine avid thyroid tissue confined to the neck.   No imaging evidence of distant metastatic cancer. TG was 3.6  PET/CT 12/14/2012  0.7 cm right axillary lymph node exhibits increased radiotracer activity. Small lymph nodes in the axillary are normal.  Soft tissue in the anterior mediastinum is more prominent than expected for a patient of this age. There is a focus of increased tracer activity in the anterior mediastinal soft tissue on the right with a max SUV of 4.8. Soft tissue in the left aspect of the anterior mediastinum exhibits a max SUV of 3.8. No mediastinal lymphadenopathy. Impression soft tissue in the anterior mediastinum with increased metabolic activity. Right axillary lymph node with increased metabolic activity. Metastatic disease cannot be excluded. No abnormal activity in the expected location of the thyroid gland    CT chest January 2013  Anterior mediastinal opacities as described on the left rather than right. Left-sided lesion is slightly smaller than the CT images of the PET scan December 2012. The etiology of the posterior in the right anterior mediastinum is indeterminate. It could be thymic tissue. There are 2 soft tissue densities extending inferiorly along the great vessel to form a band of opacity which corresponds to the thickened pericardial surface adjacent to the great vessels. Should be noted that neither the right-sided focus in the anterior mediastinum not apparent pericardial thickening was identified on the recent PET scan. Scattered tiny nodular opacities majority of which are peripheral distribution and probably reflect small lymph nodes. She is on 150 mcg the longest, celiac was negative in the past    She is postpartum, delivered July 2021  She is taking Synthroid consistently,    No change in the size of the neck or neck pain. No dysphagia,dysphonia or dyspnea.       Wt Readings from Last 3 Encounters:   10/18/21 175 lb (79.4 kg)   09/13/21 182 lb 9.6 oz (82.8 kg)   07/06/21 207 lb (93.9 kg)       Past Medical History:   Diagnosis Date    Abnormal Pap smear of cervix 10/05/2017; 10/30/18    Negative, HR HPV+, 16+, 18 negative; TONE/Neg HPV    Anxiety     Arthritis     Bursitis     and scapulothoracic dyskinesia, right shoulder    Fatigue     Headache     History of colposcopy 12/21/2018    Benign    History of endometrial biopsy 12/21/2018    Benign    Joint pain     Kidney stones     Muscle pain     Pap smear for cervical cancer screening 05/14/2014; 11/4/2019    negative; negative, HPV negative    Psychiatric problem     Anxiety    Shoulder bursitis     right    Thyroid cancer ([address] 1/10/2013    Complete Thyroidectomy 2012. Current Outpatient Medications   Medication Sig    Synthroid 150 mcg tablet 1 tab PO before breakfast Mon - Sat , 1.5 tablet on Sunday (Patient taking differently: Take 150 mcg by mouth Daily (before breakfast). )    ibuprofen (MOTRIN) 600 mg tablet Take 1 Tablet by mouth every six (6) hours as needed for Pain. Take with food.  diphenhydrAMINE (BenadryL) 25 mg capsule Take 25 mg by mouth every six (6) hours as needed.  PNV Comb #2-Iron-FA-Omega 3 29-1-400 mg cmpk Take 1 Tablet by mouth daily.  cholecalciferol (VITAMIN D3) (5000 Units/125 mcg) tab tablet Take 10,000 Units by mouth every seven (7) days. No current facility-administered medications for this visit. Allergies   Allergen Reactions    Amoxicillin Hives         Review of Systems: Per HPI    Physical Examination:  currently breastfeeding. General: pleasant, no distress, good eye contact  HEENT: no exophthalmos, no periorbital edema, EOMI  Neck: No visible thyromegaly  RS: Normal respiratory effort  Musculoskeletal: no tremors  Neurological: alert and oriented  Psychiatric: normal mood and affect  Skin: Normal color  Data Reviewed:     [x] Reviewed labs      Assessment/Plan:     No diagnosis found.     Metastatic papillary thyroid cancer status post total thyroidectomy as well as central neck lymph node dissection in 2011 at [facility]. GRANADOS  2012 with 167 mCi, with Tg of 3.6  She had TG positive, scan negative disease without any radiological evidence. TG November 2019 less than 5, stable  Ultrasound neck May 2020 - neg  2021: Thyroglobulin 10, ultrasound neck negative,  CT chest November 2021 - LUNGS: Multiple pulmonary nodules, measuring up to 3 mm in the right upper lobe up to 4 mm in the right middle lobe , up to 4 mm in the right lower lobe  Up to 1 to 2 mm in the lingula  Up to 3 mm in the left lower lobe. INCIDENTALLY IMAGED UPPER ABDOMEN: Nonobstructing 4 mm left renal calculus. Segments VI 6 x 3.6 cm enhancing mass (series 2, image 45). Segment VI 11 x 11  mm enhancing mass (image 51). More central right hepatic 19 mm and 10 mm  enhancing masses (series 2, image 54). BONES: No destructive bone lesion. PET/CT  2021 /11 -Diffuse FDG uptake in the breasts, possibly related to lactation. Clinical  correlation recommended. 2.  Pulmonary nodules, indeterminate. 2.  No FDG uptake associated with liver lesions. The last CT chest was in 2013 at [facility], there was some nodular opacities then, I do not have the actaul images , she had a PET/CT in 2012 either at [facility] or [facility]. Small nodular densities in the lung, indeterminate, too small to biopsy  Discussed with the patient to call [facility] and see if she can get the images on a CD sent to her so we can compare it here with old images.     Enhancing lesion on the liver on CT has no FDG uptake on PET, differential hemangioma, focal nodular hyperplasia, ultrasound liver ordered     She got IV contrast beginning of November 2021, cannot do radioactive iodine whole-body scan for another 2 months    Discussed with Dr. Audrey Mcdonald , oncologist and will refer to her     Post ablation hypothyroidism  Synthroid 1 tablet Monday through Saturday, 1.5 Sunday   Decreased Synthroid to 1 tablet daily    Vitamin D deficiency: 10,000 units week      There are no Patient Instructions on file for this visit. Patient /caregiver verbalized understanding   Voice-recognition software was used to generate this report, which may result in some phonetic-based errors in the grammar and contents. Even though attempts were made to correct all the mistakes, some may have been missed and remained in the body of the report.

## 2021-12-09 NOTE — PROGRESS NOTES
Adams Ojeda is a 31 y.o. female here for   Chief Complaint   Patient presents with    Thyroid Problem    Vitamin D Deficiency    Vitamin B12 Deficiency       1. Have you been to the ER, urgent care clinic since your last visit? Hospitalized since your last visit? -no    2. Have you seen or consulted any other health care providers outside of the [address] [place] [address] since your last visit?   Include any pap smears or colon screening.-no

## 2021-12-09 NOTE — Clinical Note
She wants to come here for the labs soon, you can send my chart message when she can come in if she needs appointment, nonfasting.     Change the follow-up appointment to 3 months    Refer to Dr Inocente Bird <VCI

## 2021-12-17 LAB
ANA SER QL: NEGATIVE
RHEUMATOID FACT SERPL-ACNC: 12.4 IU/ML (ref 0–15)

## 2021-12-22 ENCOUNTER — TELEPHONE (OUTPATIENT)
Dept: ENDOCRINOLOGY | Age: 36
End: 2021-12-22

## 2021-12-22 NOTE — TELEPHONE ENCOUNTER
Asked pt to  images and take to [address] [place] [address] for comparison. discs and records placed at .

## 2022-03-18 PROBLEM — E07.9 DISORDER OF THYROID, ANTEPARTUM: Status: ACTIVE | Noted: 2020-12-17

## 2022-03-18 PROBLEM — O99.280 DISORDER OF THYROID, ANTEPARTUM: Status: ACTIVE | Noted: 2020-12-17

## 2022-03-19 PROBLEM — Z3A.39 39 WEEKS GESTATION OF PREGNANCY: Status: ACTIVE | Noted: 2021-07-06

## 2022-03-24 ENCOUNTER — OFFICE VISIT (OUTPATIENT)
Dept: ENDOCRINOLOGY | Age: 37
End: 2022-03-24
Payer: COMMERCIAL

## 2022-03-24 VITALS
OXYGEN SATURATION: 96 % | TEMPERATURE: 97.9 F | BODY MASS INDEX: 36.08 KG/M2 | WEIGHT: 179 LBS | DIASTOLIC BLOOD PRESSURE: 71 MMHG | RESPIRATION RATE: 16 BRPM | HEART RATE: 93 BPM | SYSTOLIC BLOOD PRESSURE: 113 MMHG | HEIGHT: 59 IN

## 2022-03-24 DIAGNOSIS — C73 THYROID CANCER (HCC): ICD-10-CM

## 2022-03-24 DIAGNOSIS — E66.9 NON MORBID OBESITY: ICD-10-CM

## 2022-03-24 DIAGNOSIS — E89.0 POSTABLATIVE HYPOTHYROIDISM: Primary | ICD-10-CM

## 2022-03-24 DIAGNOSIS — R63.5 WEIGHT GAIN: ICD-10-CM

## 2022-03-24 PROCEDURE — 99215 OFFICE O/P EST HI 40 MIN: CPT | Performed by: INTERNAL MEDICINE

## 2022-03-24 RX ORDER — LEVOTHYROXINE SODIUM 150 MCG
150 TABLET ORAL
Qty: 90 TABLET | Refills: 2 | Status: SHIPPED | OUTPATIENT
Start: 2022-03-24 | End: 2022-05-21

## 2022-03-24 NOTE — LETTER
3/24/2022    Patient: Zaynab Boucher   YOB: 1985   Date of Visit: 3/24/2022     Savannah Gallegos MD  [address] [facility]  [address]  [facility]    Dear Savannah Gallegos MD,      Thank you for referring Ms. Chau Marquez to [address] [place] [address] for evaluation. My notes for this consultation are attached. If you have questions, please do not hesitate to call me. I look forward to following your patient along with you.       Sincerely,    Marissa Solomon MD

## 2022-03-24 NOTE — PATIENT INSTRUCTIONS
[facility]  Radioactive Iodine Ablation for Thyroid Cancer Protocol     As we have discussed together, the best next step in the management of your thyroid condition is to treat any remaining thyroid cells present in your body. This treatment is called radioactive iodine. This process takes a full week to complete and requires meticulous coordination to complete properly. For this reason, please use this document as a guide to help you accomplish this successfully. You should be notified by the scheduling team regarding the following appointments. MONDAY: [facility] to [place] [facility] to receive first injection of Thyrogen April 11 th  Before you receive this injection, ask the nurse to check with the nuclear medicine team to make sure that the Wednesday iodine tablet is prepared for you. Do not receive this injection if the nuclear medicine team is not ready to provide the radioactive iodine tablet on Wednesday. TUESDAY: Report to the [facility] to receive second injection of Thyrogen on April 12 th     [facility]: Plan accordingly with the Nuclear Medicine Department to receive a Treatment dose of radioactive iodine (GRANADOS) which will aid in detecting and destroying any remaining thyroid cells. This should be received on this day unless otherwise advised by the Nuclear Medicine team of a different date/time. More than likely this will take place at the [address] location. 1 Week later you will have a whole body scan to determine if there is any residual thyroid tissue in the body. This should be exactly 1 week after the day you received the radioactive iodine. This will also be scheduled by the same nuclear medicine department and also will likely take place at the [address] location.          Be advised that although your endocrinologist will request and coordinate the week, it will be important for you to stay in touch with these departments (Outpatient infusion center & the [facility] nuclear medicine department) to make certain that your appointments are lined up accordingly for everything to go smoothly. Discuss with them the above instructions provided to you to assure that everyone is planning on the same. Pregnancy test 72 hours before iodine treatment   Thyroid labs on the day of the iodine treatment         General Information About the Low-Iodine Diet   1. LOW IODINE has NOTHING TO DO WITH SODIUM. Sodium in any form is OK, as long as it is not provided as IODIZED salt. NON-IODIZED salt is OK for the diet. 2. NO MILK or MILK PRODUCTS are permissable because milk is an INTRINSIC site for the biological concentration of iodine. In addition, commercial milking machines are often cleansed with iodine solutions, as are containers & cows' teats. 3. Most commercial vitamin preparations have IODINE ADDED as an essential nutrient. The only preparation which I have found to be fine for the diet is Vicon Forte=AE. Additional Guidelines   1. Avoid restaurant foods since there is no reasonable way to determine which restaurants use iodized salt. 2. Non-iodized salt may be used as desired. Important Note   Food prepared from any fresh meats, fresh poultry, fresh or frozen vegetables, and fresh fruits should be fine for this diet, provided that you do not add any of the ingredients listed above to avoid. Instructions for salivary cortisol test    1. Do not brush teeth or floss  before collecting specimen. 2. Do not eat or drink for 30 minutes prior to specimen collection. 3. 24 hours before collection - do not use any creams or lotion that contains steroids such as         hydrocortisone or use any steroid inhalers. These products may contaminate the absorbent.   4. Avoid activities that may cause gum bleeding before collection      Night 1- Put the absorbent pad under your tongue between 11p-midnight for four (4) minutes, label with name, date of birth, collection date and collection time. Place pad in the freezer.     Night 5- Put the absorbent pad under your tongue between 11p-midnight for four (4) minutes, label with name, date of birth, collection date and collection time. Place pad in the freezer.      Absorbent pads are to remain frozen until you can drop them off to [facility] with orders or our lab in the office.

## 2022-03-24 NOTE — PROGRESS NOTES
Livier Ji is a 30 y.o. female here for   Chief Complaint   Patient presents with    Diabetes       1. Have you been to the ER, urgent care clinic since your last visit? Hospitalized since your last visit? -no    2. Have you seen or consulted any other health care providers outside of the [address] [place] [address] since your last visit?   Include any pap smears or colon screening.-no

## 2022-03-30 LAB
T3 SERPL-MCNC: 158 NG/DL (ref 71–180)
THYROGLOB SERPL-MCNC: 6.9 NG/ML
TSH SERPL DL<=0.005 MIU/L-ACNC: 0.01 UIU/ML (ref 0.45–4.5)

## 2022-03-31 ENCOUNTER — TELEPHONE (OUTPATIENT)
Dept: ENDOCRINOLOGY | Age: 37
End: 2022-03-31

## 2022-03-31 NOTE — TELEPHONE ENCOUNTER
Spoke with [facility] radiology department regarding old CT scan comparison to recent CT scan. Nurse was informed that Dr Ariana Pardo is one of the only physicians to be able to review. Stated he will be in the office Monday and the message will be passed again.

## 2022-04-07 NOTE — TELEPHONE ENCOUNTER
Spoke to [address] [place] [address] radiology. [facility] stated the note was given to Dr. Sisi Yepez but she did not see where an addendum was made. Radiologist contacted Dr. Sisi Yepez and was told that Dr. Sisi Yepez had so many PET scans the day she was at [address] she could not get to it. She will do the comparison when she's back next week.

## 2022-04-25 PROBLEM — C73 PAPILLARY THYROID CARCINOMA (HCC): Status: ACTIVE | Noted: 2022-04-25

## 2022-07-28 ENCOUNTER — OFFICE VISIT (OUTPATIENT)
Dept: ENDOCRINOLOGY | Age: 37
End: 2022-07-28
Payer: COMMERCIAL

## 2022-07-28 VITALS
DIASTOLIC BLOOD PRESSURE: 74 MMHG | WEIGHT: 181 LBS | BODY MASS INDEX: 36.49 KG/M2 | HEART RATE: 87 BPM | SYSTOLIC BLOOD PRESSURE: 111 MMHG | HEIGHT: 59 IN | TEMPERATURE: 97.9 F | RESPIRATION RATE: 18 BRPM | OXYGEN SATURATION: 98 %

## 2022-07-28 DIAGNOSIS — C73 THYROID CANCER (HCC): Primary | ICD-10-CM

## 2022-07-28 DIAGNOSIS — R63.5 WEIGHT GAIN: ICD-10-CM

## 2022-07-28 DIAGNOSIS — E66.9 NON MORBID OBESITY: ICD-10-CM

## 2022-07-28 DIAGNOSIS — E89.0 POSTABLATIVE HYPOTHYROIDISM: ICD-10-CM

## 2022-07-28 PROCEDURE — 99215 OFFICE O/P EST HI 40 MIN: CPT | Performed by: INTERNAL MEDICINE

## 2022-07-28 NOTE — PATIENT INSTRUCTIONS
Dr Natanael Cano / Dr Rashad Meraz, [address]  [place], [place], [facility] [place]     [phone]      Instructions for salivary cortisol test    1. Do not brush teeth or floss  before collecting specimen. 2. Do not eat or drink for 30 minutes prior to specimen collection. 3. 24 hours before collection - do not use any creams or lotion that contains steroids such as         hydrocortisone or use any steroid inhalers. These products may contaminate the absorbent. 4. Avoid activities that may cause gum bleeding before collection      Night 1- Put the absorbent pad under your tongue between 11p-midnight for four (4) minutes, label with name, date of birth, collection date and collection time. Place pad in the freezer. Night 5- Put the absorbent pad under your tongue between 11p-midnight for four (4) minutes, label with name, date of birth, collection date and collection time. Place pad in the freezer. Absorbent pads are to remain frozen until you can drop them off to [facility] with orders or our lab in the office.

## 2022-07-28 NOTE — PROGRESS NOTES
Conchis Colon MD           Patient Information  Date:7/30/2022  Name : Jacinto Burton 30 y.o.     YOB: 1985           History of present illness    Jacinto Burton is a 30 y.o. female  here for follow-up of thyroid cancer  Papillary thyroid carcinoma T3N1 2.1 cm, status post total thyroidectomy at [facility] by Dr. Vinod Arizmendi, in October 2011. She had metastatic carcinoma in 6 of 7 lymph nodes level VI nodes with vascular and lymphatic invasion. 2 parathyroid glands were removed right inferior and right superior. Thyrogen stimulated ablation February 2012 with 167 mCi with a TG of 3.6. Neck ultrasound in October was negative, initial TG was 0.8, increased to 1.1. Thyrogen stimulated TG was 40 with a negative whole-body scan. She had neck ultrasound at [facility] in December which was negative for recurrent disease. PET scan showed uptake in one axillary node in the mediastinum. She saw Dr. aLy Alvarez at [facility],     November 2011: TSH 1.3, TG 6.6 (postop, PreI-131)  May 31, 2012, TSH less than 0.01, TG 0.8, TG antibody less than 20  October 16, 2012: TG 1.1, TG antibody less than 20, TSH 0.042  November 30, 2012: Thyrogen stimulated TG 40, TG antibody less than 20  January 18, 2013: TG 1.5, TG antibody less than 20, TSH 0.019 (on Synthroid 125 mcg)  April 2013: TG 1.5, TSH 0.45  February 2015: TG 2.2, TSH 0.3  September 2015: TSH 0.08, TG 4.6  2016: TG 6.8, TSH 0.495    Thyrogen whole-body scan: January 31, 2012  Anterior and lateral pinhole images of the neck reveal 3 small round foci of tracer uptake within the neck. One in the right neck, adjacent to the midline and 2 in the left neck located to the cephalad and lateral to the midline    Thyrogen whole-body scan iodine-131 treatment 167 mCi February 2012  Post iodine-131 treatment whole-body scan radioiodine avid thyroid tissue confined to the neck.   No imaging evidence of distant metastatic cancer. TG was 3.6  PET/CT 12/14/2012  0.7 cm right axillary lymph node exhibits increased radiotracer activity. Small lymph nodes in the axillary are normal.  Soft tissue in the anterior mediastinum is more prominent than expected for a patient of this age. There is a focus of increased tracer activity in the anterior mediastinal soft tissue on the right with a max SUV of 4.8. Soft tissue in the left aspect of the anterior mediastinum exhibits a max SUV of 3.8. No mediastinal lymphadenopathy. Impression soft tissue in the anterior mediastinum with increased metabolic activity. Right axillary lymph node with increased metabolic activity. Metastatic disease cannot be excluded. No abnormal activity in the expected location of the thyroid gland    CT chest January 2013  Anterior mediastinal opacities as described on the left rather than right. Left-sided lesion is slightly smaller than the CT images of the PET scan December 2012. The etiology of the posterior in the right anterior mediastinum is indeterminate. It could be thymic tissue. There are 2 soft tissue densities extending inferiorly along the great vessel to form a band of opacity which corresponds to the thickened pericardial surface adjacent to the great vessels. Should be noted that neither the right-sided focus in the anterior mediastinum not apparent pericardial thickening was identified on the recent PET scan. Scattered tiny nodular opacities majority of which are peripheral distribution and probably reflect small lymph nodes.   She is on 150 mcg the longest, celiac was negative in the past    Taking the medication consistently  No change in the neck size  She has not seen oncologist, oncology office has tried to contact her  Has difficulty losing weight    Wt Readings from Last 3 Encounters:   07/28/22 181 lb (82.1 kg)   03/24/22 179 lb (81.2 kg)   10/18/21 175 lb (79.4 kg)       Past Medical History:   Diagnosis Date    Abnormal Pap smear of cervix 10/05/2017; 10/30/18    Negative, HR HPV+, 16+, 18 negative; TONE/Neg HPV    Anxiety     Arthritis     Bursitis     and scapulothoracic dyskinesia, right shoulder    Fatigue     Headache     History of colposcopy 12/21/2018    Benign    History of endometrial biopsy 12/21/2018    Benign    Joint pain     Kidney stones     Muscle pain     Pap smear for cervical cancer screening 05/14/2014; 11/4/2019    negative; negative, HPV negative    Psychiatric problem     Anxiety    Shoulder bursitis     right    Thyroid cancer ([facility] [address] 1/10/2013    Complete Thyroidectomy 2012. Current Outpatient Medications   Medication Sig    Synthroid 150 mcg tablet TAKE 1 TABLET BY MOUTH  BEFORE BREAKFAST    ibuprofen (MOTRIN) 600 mg tablet Take 1 Tablet by mouth every six (6) hours as needed for Pain. Take with food. diphenhydrAMINE (BENADRYL) 25 mg capsule Take 25 mg by mouth every six (6) hours as needed. PNV Comb #2-Iron-FA-Omega 3 29-1-400 mg cmpk Take 1 Tablet by mouth daily. (Patient not taking: Reported on 7/28/2022)    cholecalciferol (VITAMIN D3) (5000 Units/125 mcg) tab tablet Take 10,000 Units by mouth every seven (7) days. (Patient not taking: Reported on 7/28/2022)     No current facility-administered medications for this visit. Allergies   Allergen Reactions    Amoxicillin Hives         Review of Systems: Per HPI    Physical Examination:  Blood pressure 111/74, pulse 87, temperature 97.9 °F (36.6 °C), temperature source Temporal, resp. rate 18, height 4' 11\" (1.499 m), weight 181 lb (82.1 kg), last menstrual period 07/15/2022, SpO2 98 %, currently breastfeeding.   General: pleasant, no distress, good eye contact  HEENT: no exophthalmos, no periorbital edema, EOMI  Neck: No  neck mass  RS: Normal respiratory effort  Musculoskeletal: no tremors  Neurological: alert and oriented  Psychiatric: normal mood and affect  Skin: Normal color  Data Reviewed:     [x] Reviewed labs      Assessment/Plan: 1. Thyroid cancer ([facility] [address]    2. Postablative hypothyroidism    3. Weight gain    4. Non morbid obesity          Metastatic papillary thyroid cancer status post total thyroidectomy as well as central neck lymph node dissection in 2011 at [facility]. GRANADOS  2012 with 167 mCi, with Tg of 3.6  She had TG positive, scan negative disease without any radiological evidence. TG November 2019 less than 5, stable  Ultrasound neck May 2020 - neg  2021: Thyroglobulin 10, ultrasound neck negative,  CT chest November 2021 - LUNGS: Multiple pulmonary nodules, measuring up to 3 mm in the right upper lobe up to 4 mm in the right middle lobe , up to 4 mm in the right lower lobe  Up to 1 to 2 mm in the lingula  Up to 3 mm in the left lower lobe. INCIDENTALLY IMAGED UPPER ABDOMEN: Nonobstructing 4 mm left renal calculus. Segments VI 6 x 3.6 cm enhancing mass (series 2, image 45). Segment VI 11 x 11  mm enhancing mass (image 51). More central right hepatic 19 mm and 10 mm  enhancing masses (series 2, image 54). BONES: No destructive bone lesion. PET/CT November 2021  -Diffuse FDG uptake in the breasts, possibly related to lactation. Clinical correlation recommended. 2.  Pulmonary nodules, indeterminate. 2.  No FDG uptake associated with liver lesions. The last CT chest was in 2013 at [facility], there was some nodular opacities then. Small nodular densities in the lung, indeterminate, too small to biopsy  Radiologist reviewed all scans,, lung lesions are new per radiology      Enhancing lesion on the liver on CT has no FDG uptake on PET, differential hemangioma, focal nodular hyperplasia, ultrasound liver ordered, not completed  Thyrogen stimulated radioactive iodine uptake and scan ordered which can both be diagnostic as well as therapeutic if there is uptake-not completed, scans are expensive  Has not seen oncology yet  Discussed that the lung nodules are of concern, until otherwise proven we have to consider it as malignancy given persistent thyroid disease, although thyroglobulin is not impressive. Patient to call [facility], need oncology input. Discussed with oncologist      Post ablation hypothyroidism  Synthroid 1 tablet daily    Vitamin D deficiency: 10,000 units week    Non morbid obesity: Hypothyroid,   Salivary cortisol ordered in 2021,   Reordered      Spent > 40 minutes on the day of the visit reviewing chart, examining, ordering/reviewing labs, counseling, discussing therapeutics and documentation in the medical record    Patient Instructions   Dr Diamond Phoenix / Dr Carlos Fan, [address] [facility] Lin[address] Montero [facility], [address]     [phone]      Instructions for salivary cortisol test    1. Do not brush teeth or floss  before collecting specimen. 2. Do not eat or drink for 30 minutes prior to specimen collection. 3. 24 hours before collection - do not use any creams or lotion that contains steroids such as         hydrocortisone or use any steroid inhalers. These products may contaminate the absorbent. 4. Avoid activities that may cause gum bleeding before collection      Night 1- Put the absorbent pad under your tongue between 11p-midnight for four (4) minutes, label with name, date of birth, collection date and collection time. Place pad in the freezer. Night 5- Put the absorbent pad under your tongue between 11p-midnight for four (4) minutes, label with name, date of birth, collection date and collection time. Place pad in the freezer. Absorbent pads are to remain frozen until you can drop them off to [facility] with orders or our lab in the office. Follow-up and Dispositions    Return in about 6 months (around 1/28/2023). Patient /caregiver verbalized understanding   Voice-recognition software was used to generate this report, which may result in some phonetic-based errors in the grammar and contents.   Even though attempts were made to correct all the mistakes, some may have been missed and remained in the body of the report.

## 2022-07-28 NOTE — LETTER
7/30/2022    Patient: Lj Jones   YOB: 1985   Date of Visit: 7/28/2022     Abhijeet Pemberton MD  [place] [id number] [facility]  [address]  [facility]    Dear Abhijeet Pemberton MD,      Thank you for referring Ms. Gerri Hernandez to [address] [place] [address] for evaluation. My notes for this consultation are attached. If you have questions, please do not hesitate to call me. I look forward to following your patient along with you.       Sincerely,    Yossi Carney MD

## 2022-07-28 NOTE — PROGRESS NOTES
Room 2    Identified pt with two pt identifiers(name and ). Reviewed record in preparation for visit and have obtained necessary documentation. All patient medications has been reviewed. Chief Complaint   Patient presents with    Thyroid Problem       3 most recent PHQ Screens 3/24/2022   Little interest or pleasure in doing things Not at all   Feeling down, depressed, irritable, or hopeless Not at all   Total Score PHQ 2 0         Health Maintenance Review: Patient reminded of \"due or due soon\" health maintenance. I have asked the patient to contact his/her primary care provider (PCP) for follow-up on his/her health maintenance. Vitals:    22 1144   BP: 111/74   Pulse: 87   Resp: 18   Temp: 97.9 °F (36.6 °C)   TempSrc: Temporal   SpO2: 98%   Weight: 181 lb (82.1 kg)   Height: 4' 11\" (1.499 m)   PainSc:   0 - No pain   LMP: 07/15/2022         No results found for: HBA1C, MBO6PDCK, HAD5RDMK, NDH1NLCA    Coordination of Care Questionnaire:   1) Have you been to an emergency room, urgent care, or hospitalized since your last visit?   no       2. Have seen or consulted any other health care provider since your last visit? NO      Patient is accompanied by self I have received verbal consent from Luis Miguel Miller to discuss any/all medical information while they are present in the room.

## 2022-08-02 LAB
T3 SERPL-MCNC: 152 NG/DL (ref 71–180)
THYROGLOB AB SERPL-ACNC: <1 IU/ML
THYROGLOB SERPL-MCNC: 8.8 NG/ML
THYROGLOB SERPL-MCNC: NORMAL NG/ML
TSH SERPL DL<=0.005 MIU/L-ACNC: 0.01 UIU/ML (ref 0.45–4.5)

## 2022-09-01 ENCOUNTER — TRANSCRIBE ORDER (OUTPATIENT)
Dept: SCHEDULING | Age: 37
End: 2022-09-01

## 2022-09-01 DIAGNOSIS — R91.1 COIN LESION: Primary | ICD-10-CM

## 2022-09-01 DIAGNOSIS — R91.8 LUNG MASS: ICD-10-CM

## 2022-09-01 DIAGNOSIS — R93.2 NONVISUALIZATION OF GALLBLADDER: Primary | ICD-10-CM

## 2022-09-11 ENCOUNTER — DOCUMENTATION ONLY (OUTPATIENT)
Dept: ENDOCRINOLOGY | Age: 37
End: 2022-09-11

## 2022-09-15 ENCOUNTER — HOSPITAL ENCOUNTER (OUTPATIENT)
Dept: MRI IMAGING | Age: 37
Discharge: HOME | End: 2022-09-15
Payer: COMMERCIAL

## 2022-09-15 ENCOUNTER — HOSPITAL ENCOUNTER (OUTPATIENT)
Dept: CT IMAGING | Age: 37
Discharge: HOME | End: 2022-09-15
Payer: COMMERCIAL

## 2022-09-15 VITALS — WEIGHT: 185 LBS | BODY MASS INDEX: 37.37 KG/M2

## 2022-09-15 DIAGNOSIS — R91.1 COIN LESION: ICD-10-CM

## 2022-09-15 DIAGNOSIS — R93.2 NONVISUALIZATION OF GALLBLADDER: ICD-10-CM

## 2022-09-15 DIAGNOSIS — R91.8 LUNG MASS: ICD-10-CM

## 2022-09-15 PROCEDURE — 71260 CT THORAX DX C+: CPT

## 2022-09-15 PROCEDURE — 74011000636 HC RX REV CODE- 636: Performed by: INTERNAL MEDICINE

## 2022-09-15 PROCEDURE — A9576 INJ PROHANCE MULTIPACK: HCPCS | Performed by: RADIOLOGY

## 2022-09-15 PROCEDURE — 74011250636 HC RX REV CODE- 250/636: Performed by: RADIOLOGY

## 2022-09-15 PROCEDURE — 74183 MRI ABD W/O CNTR FLWD CNTR: CPT

## 2022-09-15 RX ADMIN — IOPAMIDOL 100 ML: 612 INJECTION, SOLUTION INTRAVENOUS at 15:00

## 2022-09-15 RX ADMIN — GADOTERIDOL 16 ML: 279.3 INJECTION, SOLUTION INTRAVENOUS at 14:21

## 2022-10-17 ENCOUNTER — TELEPHONE (OUTPATIENT)
Dept: ENDOCRINOLOGY | Age: 37
End: 2022-10-17

## 2022-10-17 NOTE — TELEPHONE ENCOUNTER
Informed pt Dr. Cesar Guo will do a VV with her on Thursday 10/20/22 at 1:30 pm. Pt verbalized understanding with no further questions or concerns at this time.

## 2022-10-20 ENCOUNTER — DOCUMENTATION ONLY (OUTPATIENT)
Dept: ENDOCRINOLOGY | Age: 37
End: 2022-10-20

## 2022-10-20 ENCOUNTER — VIRTUAL VISIT (OUTPATIENT)
Dept: ENDOCRINOLOGY | Age: 37
End: 2022-10-20
Payer: COMMERCIAL

## 2022-10-20 ENCOUNTER — TELEPHONE (OUTPATIENT)
Dept: ENDOCRINOLOGY | Age: 37
End: 2022-10-20

## 2022-10-20 DIAGNOSIS — C78.00 MALIGNANT NEOPLASM METASTATIC TO LUNG, UNSPECIFIED LATERALITY (HCC): ICD-10-CM

## 2022-10-20 DIAGNOSIS — C73 THYROID CANCER (HCC): Primary | ICD-10-CM

## 2022-10-20 PROCEDURE — 99215 OFFICE O/P EST HI 40 MIN: CPT | Performed by: INTERNAL MEDICINE

## 2022-10-20 NOTE — TELEPHONE ENCOUNTER
----- Message from Yanet Gomez MD sent at 10/20/2022  2:33 PM EDT -----      This patient will come in for Labs - TSH on nov 1st  or  2nd  as a walk in lab non fasting     Based on this lab- I have to order another TSH, pregnancy  test  and  TG comprehensive     Remind me

## 2022-10-20 NOTE — PATIENT INSTRUCTIONS
SPECIFIC INSTRUCTIONS BELOW     STOP synthroid         -------------PAY ATTENTION TO THESE GENERAL INSTRUCTIONS -----------------      - The medications prescribed at this visit will not be available at pharmacy until 6 pm       - YOUR MED LIST IS NOT UP TO DATE AS SOME CHANGES ARE BEING MADE AFTER THE VISIT - FOLLOW SPECIFIC INSTRUCTIONS  ABOVE     -ANY tests other than blood work, which you opt to do  outside the  [facility] facilities, you are responsible for prior authorizations if  required    - [address] [facility]- PLEASE IGNORE     Results     *Normal results will not be notified by a phone call starting January 1 2021   *If you have an upcoming visit, the results will be discussed at the visit   *Please sign up for MY CHART if you want access to your lab and test results  *Abnormal results which require immediate attention will be notified by phone call   *Abnormal results which do not require immediate assistance will be notified in 1-2 weeks       Refills    -    have your pharmacy send us a refill request . Refills are done max for one year and a visit is a must before refills are extended    Follow up appointments -  highly encourage you to make it when you are checking out. We can accommodate you into the schedule based on your clinical situation, but not for extending refills beyond a year. Labs are important to give refills and is important to get labs before the visit     Phone calls  -  Allow  24 hrs.  for non-urgent calls to be returned  Prior authorization - It may take 2-4 weeks to process  Forms  -  FMLA, DMV etc., will take up to 2 weeks to process  Cancellations - please notify the office 2 days in advance   Samples  - will only be dispensed at visits       If not showing for the appointments and cancelling appointments within 24 hours are kept track of and three  of such situations in  two consecutive years will likely be considered for termination from the practice    -------------------------------------------------------------------------------------------------------------------

## 2022-10-20 NOTE — PROGRESS NOTES
Suresh Portillo is a 30 y.o. female here for   Chief Complaint   Patient presents with    Thyroid Problem       1. Have you been to the ER, urgent care clinic since your last visit? Hospitalized since your last visit? -no    2. Have you seen or consulted any other health care providers outside of the [address] [place] [address] since your last visit?   Include any pap smears or colon screening.-Oncology Dr. Isaak Nogueira

## 2022-10-27 ENCOUNTER — TELEPHONE (OUTPATIENT)
Dept: ENDOCRINOLOGY | Age: 37
End: 2022-10-27

## 2022-10-27 NOTE — TELEPHONE ENCOUNTER
First lab is ONLY TSH -     If TSH levels are high as expected , the we plan to do  TG comprehensive  and preg test     Lennie Castaneda MD

## 2022-10-27 NOTE — TELEPHONE ENCOUNTER
Patient scheduled for 11/02/22 lab appointment and per staff message needs TSH, pregnancy test and TG comprehensive.  Please specify which pregnancy test.

## 2022-10-31 ENCOUNTER — TELEPHONE (OUTPATIENT)
Dept: ENDOCRINOLOGY | Age: 37
End: 2022-10-31

## 2022-10-31 ENCOUNTER — PATIENT MESSAGE (OUTPATIENT)
Dept: ENDOCRINOLOGY | Age: 37
End: 2022-10-31

## 2022-10-31 DIAGNOSIS — E89.0 POSTABLATIVE HYPOTHYROIDISM: ICD-10-CM

## 2022-10-31 DIAGNOSIS — C73 PRIMARY CANCER OF THYROID WITH METASTASIS TO OTHER SITE (HCC): Primary | ICD-10-CM

## 2022-10-31 DIAGNOSIS — C73 THYROID CANCER (HCC): Primary | ICD-10-CM

## 2022-11-02 ENCOUNTER — LAB ONLY (OUTPATIENT)
Dept: ENDOCRINOLOGY | Age: 37
End: 2022-11-02

## 2022-11-02 DIAGNOSIS — C73 THYROID CANCER (HCC): ICD-10-CM

## 2022-11-03 LAB — TSH SERPL DL<=0.05 MIU/L-ACNC: 11.1 UIU/ML (ref 0.36–3.74)

## 2022-11-04 NOTE — PROGRESS NOTES
TSH is not there yet  !    We can do TSH on Saturday and it can change sometimes very fast     Marybel Nelson MD

## 2022-11-07 ENCOUNTER — TELEPHONE (OUTPATIENT)
Dept: ENDOCRINOLOGY | Age: 37
End: 2022-11-07

## 2022-11-07 DIAGNOSIS — C73 THYROID CANCER (HCC): Primary | ICD-10-CM

## 2022-11-07 NOTE — TELEPHONE ENCOUNTER
Informed pt we need TSH rechecked ASAP. Pt was unable to go on Saturday due to being sick. Pt will try to go to [facility] now.  Order faxed to [facility] in [place] per pt request.

## 2022-11-08 ENCOUNTER — DOCUMENTATION ONLY (OUTPATIENT)
Dept: ENDOCRINOLOGY | Age: 37
End: 2022-11-08

## 2022-11-08 DIAGNOSIS — C73 THYROID CANCER (HCC): Primary | ICD-10-CM

## 2022-11-08 LAB
B-HCG SERPL QL: NEGATIVE MIU/ML
TSH SERPL DL<=0.005 MIU/L-ACNC: 23.7 UIU/ML (ref 0.45–4.5)

## 2022-11-08 NOTE — TELEPHONE ENCOUNTER
Called [facility] to get TSH results. TSH came in at 23. 7. per Dr. Handy Mock sent mychart msg to pt informing her it is not high enough to do the treatment and we need to repeat labs on Monday. Detail Level: Detailed Wound Evaluated By: NAVNEET De La Rosa

## 2022-11-08 NOTE — PROGRESS NOTES
Reviewed labs today     TSH was @ 21   from Saturday nov 6 2022     So, could not schedule her for GRANADOS this Wednesday     Planning to run TSH next Saturday nov 13 2022     Dany Jones MD

## 2022-11-08 NOTE — TELEPHONE ENCOUNTER
Spoke to Lorenzo Ambrose in [facility] and informed her of the situation. She stated to call her next week when we get the results and she will check with the pharmacy, they should get more in next week. Wwe will just have to wait for the TSH results and go from there.

## 2022-11-10 NOTE — TELEPHONE ENCOUNTER
Order placed for pt per verbal order with read back from Dr. Dante Arambula 11/10/22    Lab slips faxed to [address] [place] [address]

## 2022-11-13 LAB — TSH SERPL DL<=0.005 MIU/L-ACNC: 42.2 UIU/ML (ref 0.45–4.5)

## 2022-11-14 ENCOUNTER — TELEPHONE (OUTPATIENT)
Dept: ENDOCRINOLOGY | Age: 37
End: 2022-11-14

## 2022-11-14 NOTE — TELEPHONE ENCOUNTER
Patients mom alison collins would like to speak to you in ref to cleaning and how to handle patient care while on radio active iodine.  Please call [phone]  Thank you

## 2022-11-14 NOTE — TELEPHONE ENCOUNTER
In general patient has to use disposable containers and double flush toilets and do the cleanign as much as they can maintaining the distance     After a week , she can take help etc.,     At nuclear dept - they will be given advise as well     Huma Mccoy MD

## 2022-11-14 NOTE — TELEPHONE ENCOUNTER
Informed pt's mother, on HIPAA of Dr. Conchis Ling note. She verbalized understanding with no further questions or concerns at this time.

## 2022-11-15 NOTE — TELEPHONE ENCOUNTER
Order placed for pt per verbal order with read back from Dr. Constanza France 11/15/22    Order faxed to [address] [place] [address]

## 2022-11-16 ENCOUNTER — HOSPITAL ENCOUNTER (OUTPATIENT)
Dept: NUCLEAR MEDICINE | Age: 37
Discharge: HOME | End: 2022-11-16
Attending: INTERNAL MEDICINE
Payer: COMMERCIAL

## 2022-11-16 DIAGNOSIS — C73 PRIMARY CANCER OF THYROID WITH METASTASIS TO OTHER SITE (HCC): ICD-10-CM

## 2022-11-16 PROCEDURE — 79005 NUCLEAR RX ORAL ADMIN: CPT

## 2022-11-16 RX ORDER — SODIUM IODIDE I 131 100 MCI/1
201 CAPSULE ORAL ONCE
Status: COMPLETED | OUTPATIENT
Start: 2022-11-16 | End: 2022-11-16

## 2022-11-16 RX ADMIN — SODIUM IODIDE I 131 201 MILLICURIE: 100 CAPSULE ORAL at 08:20

## 2022-11-17 RX ORDER — ONDANSETRON 4 MG/1
4 TABLET, ORALLY DISINTEGRATING ORAL
Qty: 30 TABLET | Refills: 1 | Status: SHIPPED | OUTPATIENT
Start: 2022-11-17

## 2022-11-17 RX ORDER — ONDANSETRON 4 MG/1
4 TABLET, ORALLY DISINTEGRATING ORAL
Qty: 30 TABLET | Refills: 0 | Status: CANCELLED | OUTPATIENT
Start: 2022-11-17

## 2022-11-22 LAB
THYROGLOB AB SERPL-ACNC: <1 IU/ML
THYROGLOB SERPL-MCNC: 461.5 NG/ML
THYROGLOB SERPL-MCNC: ABNORMAL NG/ML

## 2022-11-23 ENCOUNTER — HOSPITAL ENCOUNTER (OUTPATIENT)
Dept: NUCLEAR MEDICINE | Age: 37
Discharge: HOME | End: 2022-11-23
Attending: INTERNAL MEDICINE
Payer: COMMERCIAL

## 2022-11-23 DIAGNOSIS — C73 PRIMARY CANCER OF THYROID WITH METASTASIS TO OTHER SITE (HCC): ICD-10-CM

## 2022-11-23 PROCEDURE — 78018 THYROID MET IMAGING BODY: CPT

## 2022-11-28 ENCOUNTER — DOCUMENTATION ONLY (OUTPATIENT)
Dept: ENDOCRINOLOGY | Age: 37
End: 2022-11-28

## 2022-11-28 ENCOUNTER — PATIENT MESSAGE (OUTPATIENT)
Dept: ENDOCRINOLOGY | Age: 37
End: 2022-11-28

## 2022-11-28 DIAGNOSIS — C73 THYROID CANCER (HCC): Primary | ICD-10-CM

## 2022-11-28 DIAGNOSIS — C78.00 MALIGNANT NEOPLASM METASTATIC TO LUNG, UNSPECIFIED LATERALITY (HCC): ICD-10-CM

## 2022-11-29 NOTE — PROGRESS NOTES
Upon review, the stimulated TG is high - going along with lung mets     GRANADOS WBS showed mediastinal and supraclavicular area activity also   Ordering Ct neck and chest as they were last done in nov 2021     Angus Merritt MD

## 2022-12-16 NOTE — PROGRESS NOTES
_ [address] [facility]  [email or web address]  [phone]    Enoc Nelson MD, FACOG     Follow-up OB visit    Chief Complaint   Patient presents with    Routine Prenatal Visit       Vitals:    18 1428   BP: 122/62   Weight: 188 lb (85.3 kg)       Patient Active Problem List    Diagnosis Date Noted    Prenatal care of multigravida, antepartum 10/28/2017    Pregnant 2016    Chronic migraine without aura without status migrainosus, not intractable 2015    Analgesic overuse headache 2015    Intractable migraine without aura 2015    Thyroid cancer ([facility] [address] 01/10/2013       The patient reports the following concerns: none    See PN flowsheet for exam    34 y.o.  37w5d   Encounter Diagnosis   Name Primary?  Prenatal care of multigravida, antepartum Yes        [] SAB/bleeding precautions reviewed   [] PTL/PPROM precautions reviewed   [x] Labor precautions reviewed   [x] Fetal kick counts discussed   [] Labs reviewed with patient   [] Ramón Rupesh precautions reviewed   [] Consent reviewed   [] Handouts given to pt   [] Glucola handout    [] GBS/labor/Magic Hour handout   []    []    []    []    Follow-up Disposition:  Return in about 1 week (around 2018) for Follow up OB visit. No orders of the defined types were placed in this encounter.       Enoc Nleson MD
No

## 2022-12-28 ENCOUNTER — HOSPITAL ENCOUNTER (OUTPATIENT)
Dept: CT IMAGING | Age: 37
End: 2022-12-28
Attending: INTERNAL MEDICINE
Payer: COMMERCIAL

## 2022-12-28 ENCOUNTER — HOSPITAL ENCOUNTER (OUTPATIENT)
Dept: CT IMAGING | Age: 37
Discharge: HOME | End: 2022-12-28
Attending: INTERNAL MEDICINE
Payer: COMMERCIAL

## 2022-12-28 DIAGNOSIS — C73 THYROID CANCER (HCC): ICD-10-CM

## 2022-12-28 DIAGNOSIS — C78.00 MALIGNANT NEOPLASM METASTATIC TO LUNG, UNSPECIFIED LATERALITY (HCC): ICD-10-CM

## 2022-12-28 PROCEDURE — 71260 CT THORAX DX C+: CPT

## 2022-12-28 PROCEDURE — 74011000636 HC RX REV CODE- 636: Performed by: INTERNAL MEDICINE

## 2022-12-28 RX ADMIN — IOPAMIDOL 100 ML: 755 INJECTION, SOLUTION INTRAVENOUS at 13:20

## 2023-02-02 ENCOUNTER — OFFICE VISIT (OUTPATIENT)
Dept: ENDOCRINOLOGY | Age: 38
End: 2023-02-02
Payer: COMMERCIAL

## 2023-02-02 VITALS
HEART RATE: 84 BPM | RESPIRATION RATE: 18 BRPM | TEMPERATURE: 98.2 F | OXYGEN SATURATION: 100 % | HEIGHT: 59 IN | DIASTOLIC BLOOD PRESSURE: 65 MMHG | SYSTOLIC BLOOD PRESSURE: 115 MMHG | BODY MASS INDEX: 38.28 KG/M2 | WEIGHT: 189.9 LBS

## 2023-02-02 DIAGNOSIS — E53.8 VITAMIN B12 DEFICIENCY: ICD-10-CM

## 2023-02-02 DIAGNOSIS — E66.9 NON MORBID OBESITY: ICD-10-CM

## 2023-02-02 DIAGNOSIS — E89.0 POSTABLATIVE HYPOTHYROIDISM: ICD-10-CM

## 2023-02-02 DIAGNOSIS — L70.9 ACNE, UNSPECIFIED ACNE TYPE: ICD-10-CM

## 2023-02-02 DIAGNOSIS — R63.5 WEIGHT GAIN: ICD-10-CM

## 2023-02-02 DIAGNOSIS — C73 THYROID CANCER (HCC): Primary | ICD-10-CM

## 2023-02-02 DIAGNOSIS — E55.9 VITAMIN D DEFICIENCY: ICD-10-CM

## 2023-02-02 RX ORDER — LEVOTHYROXINE SODIUM 150 MCG
TABLET ORAL
Qty: 90 TABLET | Refills: 3 | Status: SHIPPED | OUTPATIENT
Start: 2023-02-02

## 2023-02-02 NOTE — PATIENT INSTRUCTIONS
Take Dexamethasone 1 tablet at 11pm the night before and go to the lab for blood  draw between 7:30-8:30 the next morning. It  is very important for the blood to be drawn before 8:30 AM

## 2023-02-02 NOTE — LETTER
2/4/2023    Patient: Livier Ji   YOB: 1985   Date of Visit: 2/2/2023     Santhosh Glass MD  [address] [facility]  [address]  [address] [place] [address]    Dear Santhosh Glass MD,      Thank you for referring Ms. Ilya Villagomez to [address] [place] [address] for evaluation. My notes for this consultation are attached. If you have questions, please do not hesitate to call me. I look forward to following your patient along with you.       Sincerely,    José Manuel Shirley MD

## 2023-02-02 NOTE — PROGRESS NOTES
Danita Anders MD           Patient Information  Date:2/2/2023  Name : Med Turner 30 y.o.     YOB: 1985           History of present illness    Med Turner is a 30 y.o. female  here for follow-up of thyroid cancer. 2/2/23  Taking thyroid meds consistently  Gaining weight  Worsening acne  Having regular cycles, painful  No testosterone exposure, no hormones  No excess hair growth  No supplements  No FHX of PCOS    Prior history  Papillary thyroid carcinoma T3N1 2.1 cm, status post total thyroidectomy at [facility] by Dr. Hans Romo, in October 2011. She had metastatic carcinoma in 6 of 7 lymph nodes level VI nodes with vascular and lymphatic invasion. 2 parathyroid glands were removed right inferior and right superior. Thyrogen stimulated ablation February 2012 with 167 mCi with a TG of 3.6. Neck ultrasound in October was negative, initial TG was 0.8, increased to 1.1. Thyrogen stimulated TG was 40 with a negative whole-body scan. She had neck ultrasound at [facility] in December which was negative for recurrent disease. PET scan showed uptake in one axillary node in the mediastinum. She saw Dr. Esther Reese at [facility],     November 2011: TSH 1.3, TG 6.6 (postop, PreI-131)  May 31, 2012, TSH less than 0.01, TG 0.8, TG antibody less than 20  October 16, 2012: TG 1.1, TG antibody less than 20, TSH 0.042  November 30, 2012: Thyrogen stimulated TG 40, TG antibody less than 20  January 18, 2013: TG 1.5, TG antibody less than 20, TSH 0.019 (on Synthroid 125 mcg)  April 2013: TG 1.5, TSH 0.45  February 2015: TG 2.2, TSH 0.3  September 2015: TSH 0.08, TG 4.6  2016: TG 6.8, TSH 0.495    Thyrogen whole-body scan: January 31, 2012  Anterior and lateral pinhole images of the neck reveal 3 small round foci of tracer uptake within the neck.   One in the right neck, adjacent to the midline and 2 in the left neck located to the cephalad and lateral to the midline    Thyrogen whole-body scan iodine-131 treatment 167 mCi February 2012  Post iodine-131 treatment whole-body scan radioiodine avid thyroid tissue confined to the neck. No imaging evidence of distant metastatic cancer. TG was 3.6  PET/CT 12/14/2012  0.7 cm right axillary lymph node exhibits increased radiotracer activity. Small lymph nodes in the axillary are normal.  Soft tissue in the anterior mediastinum is more prominent than expected for a patient of this age. There is a focus of increased tracer activity in the anterior mediastinal soft tissue on the right with a max SUV of 4.8. Soft tissue in the left aspect of the anterior mediastinum exhibits a max SUV of 3.8. No mediastinal lymphadenopathy. Impression soft tissue in the anterior mediastinum with increased metabolic activity. Right axillary lymph node with increased metabolic activity. Metastatic disease cannot be excluded. No abnormal activity in the expected location of the thyroid gland    CT chest January 2013  Anterior mediastinal opacities as described on the left rather than right. Left-sided lesion is slightly smaller than the CT images of the PET scan December 2012. The etiology of the posterior in the right anterior mediastinum is indeterminate. It could be thymic tissue. There are 2 soft tissue densities extending inferiorly along the great vessel to form a band of opacity which corresponds to the thickened pericardial surface adjacent to the great vessels. Should be noted that neither the right-sided focus in the anterior mediastinum not apparent pericardial thickening was identified on the recent PET scan. Scattered tiny nodular opacities majority of which are peripheral distribution and probably reflect small lymph nodes.   She is on 150 mcg the longest, celiac was negative in the past    Taking the medication consistently  No change in the neck size  She has not seen oncologist, oncology office has tried to contact her  Has difficulty losing weight    Wt Readings from Last 3 Encounters:   02/02/23 189 lb 14.4 oz (86.1 kg)   09/15/22 185 lb (83.9 kg)   07/28/22 181 lb (82.1 kg)       Past Medical History:   Diagnosis Date    Abnormal Pap smear of cervix 10/05/2017; 10/30/18    Negative, HR HPV+, 16+, 18 negative; TONE/Neg HPV    Anxiety     Arthritis     Bursitis     and scapulothoracic dyskinesia, right shoulder    Fatigue     Headache     History of colposcopy 12/21/2018    Benign    History of endometrial biopsy 12/21/2018    Benign    Joint pain     Kidney stones     Muscle pain     Pap smear for cervical cancer screening 05/14/2014; 11/4/2019    negative; negative, HPV negative    Psychiatric problem     Anxiety    Shoulder bursitis     right    Thyroid cancer ([facility] [address] 1/10/2013    Complete Thyroidectomy 2012. Current Outpatient Medications   Medication Sig    Synthroid 150 mcg tablet TAKE 1 TABLET BY MOUTH  BEFORE BREAKFAST    ibuprofen (MOTRIN) 600 mg tablet Take 1 Tablet by mouth every six (6) hours as needed for Pain. Take with food. diphenhydrAMINE (BENADRYL) 25 mg capsule Take 25 mg by mouth every six (6) hours as needed. ondansetron (ZOFRAN ODT) 4 mg disintegrating tablet Take 1 Tablet by mouth every eight (8) hours as needed for Nausea or Vomiting. No current facility-administered medications for this visit. Allergies   Allergen Reactions    Amoxicillin Hives         Review of Systems: Per HPI    Physical Examination:  Blood pressure 115/65, pulse 84, temperature 98.2 °F (36.8 °C), temperature source Temporal, resp. rate 18, height 4' 11\" (1.499 m), weight 189 lb 14.4 oz (86.1 kg), SpO2 100 %, currently breastfeeding.   General: pleasant, no distress, good eye contact  HEENT: no exophthalmos, no periorbital edema, EOMI  Neck: No  neck mass  RS: Normal respiratory effort  Musculoskeletal: no tremors  Neurological: alert and oriented  Psychiatric: normal mood and affect  Skin: Normal color  Data Reviewed:     [x] Reviewed labs      Assessment/Plan:     No diagnosis found. Metastatic papillary thyroid cancer status post total thyroidectomy as well as central neck lymph node dissection in 2011 at [facility]. GRANADOS  2012 with 167 mCi, with Tg of 3.6  She had TG positive, scan negative disease without any radiological evidence. TG November 2019 less than 5, stable  Ultrasound neck May 2020 - neg  2021: Thyroglobulin 10, ultrasound neck negative,  CT chest November 2021 - LUNGS: Multiple pulmonary nodules, measuring up to 3 mm in the right upper lobe up to 4 mm in the right middle lobe , up to 4 mm in the right lower lobe  Up to 1 to 2 mm in the lingula  Up to 3 mm in the left lower lobe. INCIDENTALLY IMAGED UPPER ABDOMEN: Nonobstructing 4 mm left renal calculus. Segments VI 6 x 3.6 cm enhancing mass (series 2, image 45). Segment VI 11 x 11  mm enhancing mass (image 51). More central right hepatic 19 mm and 10 mm  enhancing masses (series 2, image 54). BONES: No destructive bone lesion. PET/CT November 2021  -Diffuse FDG uptake in the breasts, possibly related to lactation. Clinical correlation recommended. 2.  Pulmonary nodules, indeterminate. 2.  No FDG uptake associated with liver lesions. Enhancing lesion on the liver on CT has no FDG uptake on PET, differential hemangioma, focal nodular hyperplasia,  After much discussion she finally decided to have radioactive iodine therapy. November 2022 withdrawal radioactive iodine therapy with 200 mCi  November 2022 WBS -Numerous foci of abnormal tracer activity in both lungs, compatible  with pulmonary metastatic disease. Foci of abnormal tracer activity in the  mediastinum and right supraclavicular region are suspicious for myrna metastatic  disease. Low-grade tracer activity in the thyroidectomy bed. November 2022: Thyroglobulin 400, thyroglobulin antibody negative  December 2022-IMPRESSION  1. Positive response to therapy. Decreased size of multiple subcentimeter  pulmonary metastatic nodules. 2. No lymphadenopathy at this time. 3. Unchanged probably benign left breast mass and hepatic mass (likely [address]). Post ablation hypothyroidism  Lab Results   Component Value Date/Time    TSH <0.01 (L) 02/02/2023 09:45 AM       Synthroid 1 tablet daily  We will keep TSH suppressed given metastatic thyroid cancer. Vitamin D deficiency: 10,000 units week    Non morbid obesity: Hypothyroid,   Salivary cortisol ordered twice, not completed the test      Weight gain: She is in fact getting more thyroid hormone  Rule out Cushing's  Given acne check testosterone, DHEA-S      There are no Patient Instructions on file for this visit. Patient /caregiver verbalized understanding   Voice-recognition software was used to generate this report, which may result in some phonetic-based errors in the grammar and contents. Even though attempts were made to correct all the mistakes, some may have been missed and remained in the body of the report.

## 2023-02-02 NOTE — PROGRESS NOTES
Deisy Lemons is a 30 y.o. female here for   Chief Complaint   Patient presents with    Thyroid Problem       1. Have you been to the ER or an urgent care clinic since your last visit?  - no    2. Have you been hospitalized since your last visit? - no    3. Have you seen or consulted any other health care providers outside of the [address] [place] [address] since your last visit?   Include any pap smears or colon screening.- no

## 2023-02-03 LAB
25(OH)D3 SERPL-MCNC: 20.1 NG/ML (ref 30–100)
T4 FREE SERPL-MCNC: 1.3 NG/DL (ref 0.8–1.5)
TSH SERPL DL<=0.05 MIU/L-ACNC: <0.01 UIU/ML (ref 0.36–3.74)

## 2023-02-04 LAB — DHEA-S SERPL-MCNC: 79.7 UG/DL (ref 57.3–279.2)

## 2023-02-07 ENCOUNTER — TELEPHONE (OUTPATIENT)
Dept: ENDOCRINOLOGY | Age: 38
End: 2023-02-07

## 2023-02-07 NOTE — TELEPHONE ENCOUNTER
Pt called and said she was to go to [facility] and take a pill for bloodwork on Wednesday. They did not have the pill and did we call it into the pharmacy. Please call. [phone].

## 2023-02-17 LAB
CORTIS AM PEAK SERPL-MCNC: 0.5 UG/DL (ref 6.2–19.4)
DEXAMETHASONE SERPL-MCNC: 347 NG/DL

## 2023-06-19 DIAGNOSIS — E89.0 POSTPROCEDURAL HYPOTHYROIDISM: Primary | ICD-10-CM

## 2023-06-19 RX ORDER — LEVOTHYROXINE SODIUM 150 MCG
TABLET ORAL
Qty: 90 TABLET | Refills: 3 | Status: SHIPPED | OUTPATIENT
Start: 2023-06-19

## 2023-06-29 ENCOUNTER — NURSE ONLY (OUTPATIENT)
Age: 38
End: 2023-06-29

## 2023-06-29 DIAGNOSIS — E66.9 OBESITY, UNSPECIFIED CLASSIFICATION, UNSPECIFIED OBESITY TYPE, UNSPECIFIED WHETHER SERIOUS COMORBIDITY PRESENT: ICD-10-CM

## 2023-06-29 DIAGNOSIS — R63.5 ABNORMAL WEIGHT GAIN: Primary | ICD-10-CM

## 2023-06-29 DIAGNOSIS — E89.0 POSTPROCEDURAL HYPOTHYROIDISM: Primary | ICD-10-CM

## 2023-06-29 DIAGNOSIS — C73 MALIGNANT NEOPLASM OF THYROID GLAND (HCC): ICD-10-CM

## 2023-06-29 LAB
T4 FREE SERPL-MCNC: 1.8 NG/DL (ref 0.8–1.5)
TSH SERPL DL<=0.05 MIU/L-ACNC: <0.01 UIU/ML (ref 0.36–3.74)

## 2023-06-29 RX ORDER — PHENTERMINE HYDROCHLORIDE 37.5 MG/1
TABLET ORAL
Qty: 30 TABLET | Refills: 3 | Status: SHIPPED | OUTPATIENT
Start: 2023-06-29 | End: 2023-09-29

## 2023-07-01 LAB — T3 SERPL-MCNC: 158 NG/DL (ref 71–180)

## 2023-07-04 LAB — TESTOST SERPL-MCNC: 25.6 NG/DL (ref 10–55)

## 2023-07-05 DIAGNOSIS — E03.9 PRIMARY HYPOTHYROIDISM: Primary | ICD-10-CM

## 2023-07-05 DIAGNOSIS — C79.9 METASTASIS FROM THYROID CANCER (HCC): ICD-10-CM

## 2023-07-05 DIAGNOSIS — C73 METASTASIS FROM THYROID CANCER (HCC): ICD-10-CM

## 2023-07-05 DIAGNOSIS — C73 MALIGNANT NEOPLASM OF THYROID GLAND (HCC): ICD-10-CM

## 2023-07-06 ENCOUNTER — OFFICE VISIT (OUTPATIENT)
Age: 38
End: 2023-07-06
Payer: COMMERCIAL

## 2023-07-06 VITALS
RESPIRATION RATE: 16 BRPM | DIASTOLIC BLOOD PRESSURE: 74 MMHG | TEMPERATURE: 98.9 F | HEIGHT: 59 IN | HEART RATE: 103 BPM | WEIGHT: 166 LBS | OXYGEN SATURATION: 99 % | SYSTOLIC BLOOD PRESSURE: 112 MMHG | BODY MASS INDEX: 33.47 KG/M2

## 2023-07-06 DIAGNOSIS — E03.9 PRIMARY HYPOTHYROIDISM: ICD-10-CM

## 2023-07-06 DIAGNOSIS — R63.5 ABNORMAL WEIGHT GAIN: ICD-10-CM

## 2023-07-06 DIAGNOSIS — C73 PAPILLARY THYROID CARCINOMA (HCC): Primary | ICD-10-CM

## 2023-07-06 PROCEDURE — 99214 OFFICE O/P EST MOD 30 MIN: CPT | Performed by: INTERNAL MEDICINE

## 2023-07-06 RX ORDER — SULFAMETHOXAZOLE AND TRIMETHOPRIM 800; 160 MG/1; MG/1
1 TABLET ORAL 2 TIMES DAILY
COMMUNITY
Start: 2023-06-29

## 2023-07-06 RX ORDER — PHENTERMINE HYDROCHLORIDE 37.5 MG/1
TABLET ORAL
Qty: 90 TABLET | Refills: 3 | Status: CANCELLED | OUTPATIENT
Start: 2023-07-06 | End: 2023-10-06

## 2023-07-06 NOTE — PROGRESS NOTES
Thierno Feldman MD                Patient Information   Date:2/2/2023   Name : Cele Busch 30 y.o.       YOB: 1985              History of present illness      Cele Busch is a 30 y.o.  female  here for follow-up of thyroid cancer. She is on phentermine, lost 20 pounds, less hunger, exercising  On Unithroid consistently  No change in the size of the neck  No chest pain, racing of the heart   No FHX of PCOS      Prior history   Papillary thyroid carcinoma T3N1 2.1 cm, status post total thyroidectomy at [facility] by Dr. Diana Phillip, in October 2011. She had metastatic carcinoma in 6 of 7 lymph nodes level VI nodes with  vascular and lymphatic invasion. 2 parathyroid glands were removed right inferior and right superior. Thyrogen stimulated ablation February 2012 with 167 mCi with a TG of 3.6. Neck ultrasound in October was negative, initial TG was 0.8, increased to  1.1. Thyrogen stimulated TG was 40 with a negative whole-body scan. She had neck ultrasound at [facility] in December which was negative for recurrent disease. PET scan showed uptake in one axillary node in the mediastinum. She saw Dr. Jayden Busby at  [facility],       November 2011: TSH 1.3, TG 6.6 (postop, PreI-131)   May 31, 2012, TSH less than 0.01, TG 0.8, TG antibody less than 20   October 16, 2012: TG 1.1, TG antibody less than 20, TSH 0.042   November 30, 2012: Thyrogen stimulated TG 40, TG antibody less than 20   January 18, 2013: TG 1.5, TG antibody less than 20, TSH 0.019 (on Synthroid 125 mcg)   April 2013: TG 1.5, TSH 0.45   February 2015: TG 2.2, TSH 0.3   September 2015: TSH 0.08, TG 4.6   2016: TG 6.8, TSH 0.495      Thyrogen whole-body scan: January 31, 2012   Anterior and lateral pinhole images of the neck reveal 3 small round foci of tracer uptake within the neck.   One in the right neck, adjacent to the midline and 2 in the left neck

## 2023-07-06 NOTE — PROGRESS NOTES
Jay Zarate is a 41 y.o. female here for   Chief Complaint   Patient presents with    Thyroid Problem       1. Have you been to the ER, urgent care clinic since your last visit? Hospitalized since your last visit? -[address] for abscess on Thursday    2. Have you seen or consulted any other health care providers outside of the [address] [place] [address] Avenue since your last visit?   Include any pap smears or colon screening.-no

## 2023-07-06 NOTE — PATIENT INSTRUCTIONS
administered dose of radioactive iodine (JOSEPH). FRIDAY: Report to the Nuclear Medicine Department at [facility] to perform the Whole Body Scan (WBS) and thus complete the week of diagnostic testing. Also on this day you need to complete a blood test including:  TSH, Thyroglobulin, Thyroglobulin antibody levels. If this is the case, be sure to complete. If uncertain, ask your endocrinologist if this will be necessary. Be advised that although your endocrinologist will request and coordinate this week, it will be important for you to stay in touch with these departments to make certain that your appointments are lined up accordingly for everything to go smoothly. Discuss with them the above instructions provided to you to assure that everyone is planning on the same.

## 2023-07-06 NOTE — PROGRESS NOTES
Progress Notes by Kamala Eduardo MD at 02/02/23 0830                    Author: Kamala Eduardo MD  Service: --  Author Type: Physician       Filed: 02/04/23 1930  Encounter Date: 2/2/2023  Status: Signed                         [facility] DIABETES AND ENDOCRINOLOGY                 Ivet Abreu MD                Patient Information   Date:2/2/2023   Name : Pretty Craig 30 y.o.       YOB: 1985              History of present illness      Pretty Craig is a 30 y.o.  female  here for follow-up of thyroid cancer. 2/2/23   Taking thyroid meds consistently   Gaining weight   Worsening acne   Having regular cycles, painful   No testosterone exposure, no hormones   No excess hair growth   No supplements   No FHX of PCOS      Prior history   Papillary thyroid carcinoma T3N1 2.1 cm, status post total thyroidectomy at [facility] by Dr. Heidi Noe, in October 2011. She had metastatic carcinoma in 6 of 7 lymph nodes level VI nodes with  vascular and lymphatic invasion. 2 parathyroid glands were removed right inferior and right superior. Thyrogen stimulated ablation February 2012 with 167 mCi with a TG of 3.6. Neck ultrasound in October was negative, initial TG was 0.8, increased to  1.1. Thyrogen stimulated TG was 40 with a negative whole-body scan. She had neck ultrasound at [facility] in December which was negative for recurrent disease. PET scan showed uptake in one axillary node in the mediastinum.   She saw Dr. Grant Gomez at  [facility],       November 2011: TSH 1.3, TG 6.6 (postop, PreI-131)   May 31, 2012, TSH less than 0.01, TG 0.8, TG antibody less than 20   October 16, 2012: TG 1.1, TG antibody less than 20, TSH 0.042   November 30, 2012: Thyrogen stimulated TG 40, TG antibody less than 20   January 18, 2013: TG 1.5, TG antibody less than 20, TSH 0.019 (on Synthroid 125 mcg)   April 2013: TG 1.5, TSH 0.45   February 2015: TG 2.2, TSH 0.3   September 2015: TSH 0.08,

## 2023-07-07 DIAGNOSIS — E89.0 POSTPROCEDURAL HYPOTHYROIDISM: ICD-10-CM

## 2023-07-07 RX ORDER — LEVOTHYROXINE SODIUM 150 MCG
TABLET ORAL
Qty: 90 TABLET | Refills: 3 | Status: SHIPPED | OUTPATIENT
Start: 2023-07-07

## 2023-11-13 ENCOUNTER — NURSE ONLY (OUTPATIENT)
Age: 38
End: 2023-11-13

## 2023-11-13 DIAGNOSIS — C73 PAPILLARY THYROID CARCINOMA (HCC): ICD-10-CM

## 2023-11-13 DIAGNOSIS — R63.5 ABNORMAL WEIGHT GAIN: ICD-10-CM

## 2023-11-13 LAB
ANION GAP SERPL CALC-SCNC: 3 MMOL/L (ref 5–15)
BUN SERPL-MCNC: 22 MG/DL (ref 6–20)
BUN/CREAT SERPL: 39 (ref 12–20)
CALCIUM SERPL-MCNC: 9.1 MG/DL (ref 8.5–10.1)
CHLORIDE SERPL-SCNC: 109 MMOL/L (ref 97–108)
CO2 SERPL-SCNC: 29 MMOL/L (ref 21–32)
CREAT SERPL-MCNC: 0.57 MG/DL (ref 0.55–1.02)
GLUCOSE SERPL-MCNC: 94 MG/DL (ref 65–100)
POTASSIUM SERPL-SCNC: 4.2 MMOL/L (ref 3.5–5.1)
SODIUM SERPL-SCNC: 141 MMOL/L (ref 136–145)
T4 FREE SERPL-MCNC: 1.4 NG/DL (ref 0.8–1.5)
TSH SERPL DL<=0.05 MIU/L-ACNC: 0.01 UIU/ML (ref 0.36–3.74)

## 2023-11-16 LAB
THYROGLOBULIN (ICMA): 4.5 NG/ML
THYROGLOBULIN (TG-RIA): NORMAL NG/ML
THYROGLOBULIN AB: <1 IU/ML

## 2023-11-20 ENCOUNTER — OFFICE VISIT (OUTPATIENT)
Age: 38
End: 2023-11-20
Payer: COMMERCIAL

## 2023-11-20 VITALS
DIASTOLIC BLOOD PRESSURE: 58 MMHG | BODY MASS INDEX: 30 KG/M2 | HEIGHT: 59 IN | TEMPERATURE: 97.7 F | RESPIRATION RATE: 18 BRPM | SYSTOLIC BLOOD PRESSURE: 123 MMHG | HEART RATE: 90 BPM | WEIGHT: 148.8 LBS | OXYGEN SATURATION: 97 %

## 2023-11-20 DIAGNOSIS — C73 PAPILLARY THYROID CARCINOMA (HCC): Primary | ICD-10-CM

## 2023-11-20 DIAGNOSIS — E66.9 NON MORBID OBESITY: ICD-10-CM

## 2023-11-20 DIAGNOSIS — E89.0 POSTPROCEDURAL HYPOTHYROIDISM: ICD-10-CM

## 2023-11-20 DIAGNOSIS — E03.9 PRIMARY HYPOTHYROIDISM: ICD-10-CM

## 2023-11-20 PROCEDURE — 99215 OFFICE O/P EST HI 40 MIN: CPT | Performed by: INTERNAL MEDICINE

## 2023-11-20 RX ORDER — PHENTERMINE HYDROCHLORIDE 37.5 MG/1
37.5 TABLET ORAL DAILY
Qty: 30 TABLET | Refills: 5 | Status: SHIPPED | OUTPATIENT
Start: 2023-11-20 | End: 2024-06-17

## 2023-11-20 RX ORDER — PHENTERMINE HYDROCHLORIDE 37.5 MG/1
37.5 TABLET ORAL DAILY
COMMUNITY
Start: 2023-11-03 | End: 2023-11-20 | Stop reason: SDUPTHER

## 2023-11-20 NOTE — PROGRESS NOTES
Joanna Reagan is a 41 y.o. female here for   Chief Complaint   Patient presents with    Thyroid Problem       1. Have you been to the ER, urgent care clinic since your last visit? Hospitalized since your last visit? - no    2. Have you seen or consulted any other health care providers outside of the [address] [place] [address] Avenue since your last visit?   Include any pap smears or colon screening.- no

## 2023-11-20 NOTE — PROGRESS NOTES
Panchito Estrada MD                Patient Information   Date:2/2/2023   Name : Dustin Smith 30 y.o.       YOB: 1985              History of present illness      Dustin Smith is a 30 y.o.  female  here for follow-up of thyroid cancer. She is on phentermine, lost 40 pounds, less hunger, exercising, no chest pain, no racing of the heart  Original weight 189 pounds  TSH suppressed  On Unithroid consistently  No change in the size of the neck  No chest pain, racing of the heart   No FHX of PCOS      Prior history   Papillary thyroid carcinoma T3N1 2.1 cm, status post total thyroidectomy at [facility] by Dr. Tracie Mccarthy, in October 2011. She had metastatic carcinoma in 6 of 7 lymph nodes level VI nodes with  vascular and lymphatic invasion. 2 parathyroid glands were removed right inferior and right superior. Thyrogen stimulated ablation February 2012 with 167 mCi with a TG of 3.6. Neck ultrasound in October was negative, initial TG was 0.8, increased to  1.1. Thyrogen stimulated TG was 40 with a negative whole-body scan. She had neck ultrasound at [facility] in December which was negative for recurrent disease. PET scan showed uptake in one axillary node in the mediastinum.   She saw Dr. Broderick Heck at  [facility],       November 2011: TSH 1.3, TG 6.6 (postop, PreI-131)   May 31, 2012, TSH less than 0.01, TG 0.8, TG antibody less than 20   October 16, 2012: TG 1.1, TG antibody less than 20, TSH 0.042   November 30, 2012: Thyrogen stimulated TG 40, TG antibody less than 20   January 18, 2013: TG 1.5, TG antibody less than 20, TSH 0.019 (on Synthroid 125 mcg)   April 2013: TG 1.5, TSH 0.45   February 2015: TG 2.2, TSH 0.3   September 2015: TSH 0.08, TG 4.6   2016: TG 6.8, TSH 0.495      Thyrogen whole-body scan: January 31, 2012   Anterior and lateral pinhole images of the neck reveal 3 small round foci of tracer uptake within

## 2023-11-20 NOTE — PATIENT INSTRUCTIONS
administered dose of radioactive iodine (JOSEPH). FRIDAY: Report to the Nuclear Medicine Department [place] [facility] [place] perform the Whole Body Scan (WBS) and thus complete the week of diagnostic testing. Also on this day you need to complete a blood test including:  TSH, Thyroglobulin, Thyroglobulin antibody levels. If this is the case, be sure to complete. If uncertain, ask your endocrinologist if this will be necessary. Be advised that although your endocrinologist will request and coordinate this week, it will be important for you to stay in touch with these departments to make certain that your appointments are lined up accordingly for everything to go smoothly. Discuss with them the above instructions provided to you to assure that everyone is planning on the same.

## 2024-02-05 DIAGNOSIS — C73 PAPILLARY THYROID CARCINOMA (HCC): Primary | ICD-10-CM

## 2024-03-01 ENCOUNTER — OFFICE VISIT (OUTPATIENT)
Age: 39
End: 2024-03-01
Payer: COMMERCIAL

## 2024-03-01 VITALS
HEART RATE: 91 BPM | BODY MASS INDEX: 31.25 KG/M2 | WEIGHT: 155 LBS | OXYGEN SATURATION: 100 % | SYSTOLIC BLOOD PRESSURE: 107 MMHG | HEIGHT: 59 IN | TEMPERATURE: 98.9 F | DIASTOLIC BLOOD PRESSURE: 65 MMHG | RESPIRATION RATE: 16 BRPM

## 2024-03-01 DIAGNOSIS — E89.0 POSTPROCEDURAL HYPOTHYROIDISM: ICD-10-CM

## 2024-03-01 DIAGNOSIS — E66.9 NON MORBID OBESITY: ICD-10-CM

## 2024-03-01 DIAGNOSIS — C73 PAPILLARY THYROID CARCINOMA (HCC): Primary | ICD-10-CM

## 2024-03-01 PROCEDURE — 99215 OFFICE O/P EST HI 40 MIN: CPT | Performed by: INTERNAL MEDICINE

## 2024-03-01 RX ORDER — LEVOTHYROXINE SODIUM 150 MCG
TABLET ORAL
Qty: 90 TABLET | Refills: 3 | Status: SHIPPED | OUTPATIENT
Start: 2024-03-01

## 2024-03-01 RX ORDER — AMITRIPTYLINE HYDROCHLORIDE 50 MG/1
50 TABLET, FILM COATED ORAL NIGHTLY
COMMUNITY
Start: 2024-02-16

## 2024-03-01 RX ORDER — BUTALBITAL, ACETAMINOPHEN AND CAFFEINE 300; 40; 50 MG/1; MG/1; MG/1
1 CAPSULE ORAL EVERY 4 HOURS PRN
COMMUNITY
Start: 2024-02-16

## 2024-03-01 NOTE — PATIENT INSTRUCTIONS
Thyrogen injection center number - [phone] , If you do not hear from the hospital in 4 days please call the number .     I have ordered Thyroid scan and if you do not hear from the hospital in 3- 4 business days  please call the number [phone] to speak with the scheduling team directly.     If you are in pregnancy age group you need a negative pregnancy test 5 days before the scan.    Low iodine diet 2 weeks before the scan - avoid seafood, daily , recommend non iodine salt    You should not have had CT scan with dye 3 months before the scan        Directions for Thyrogen/Scan      Day 1   Monday          Thyrogen first injection at Outpatient infusion center    Day 2   Tuesday        Thyrogen second injection at Outpatient infusion center    Day 3  Wednesday   Go to Nuclear medicine at [facility] for iodine pill    Day 5   Friday            Go to [facility] for lblood work as well as Nuclear medicine for scan.       Thyroid WBS Diagnostic Imaging Protocol    As we have discussed together, the best next step in the management of your thyroid condition is to evaluate for the presence of any remaining thyroid cells present in your body. This procedure is called a Whole Body Scan (WBS). This process takes a full week to complete and requires meticulous coordination to complete properly. For this reason, please use this document as a guide to help you accomplish this successfully.      MONDAY: Report to the Outpatient Infusion Center ([facility]) to receive first injection of Thyrogen.    TUESDAY: Report to the Outpatient Infusion Center ([facility]) to receive second injection of Thyrogen.    WEDNESDAY: Plan accordingly with the Nuclear Medicine Department to receive a diagnostic dose of radioactive iodine (JOSEPH) which will aid in detecting thyroid cells. This should be received on this day unless otherwise advised by the Nuclear Medicine team of a different date/time. Please notify your endocrinologist if this is the case.

## 2024-03-01 NOTE — PROGRESS NOTES
[facility] DIABETES AND ENDOCRINOLOGY                 Penny Cruz MD                Patient Information   Date:2/2/2023   Name : Isa Mcclellan 37 y.o.       YOB: 1985              History of present illness      Isa Mcclellan is  here for follow-up of thyroid cancer.    She is on phentermine,exercising, no chest pain, no racing of the heart, gained some weight  Original weight 189 pounds  Scheduled for Thyrogen stimulated WBS April 2024, could not get it last year  On Unithroid consistently  No change in the size of the neck     No FHX of PCOS      Prior history   Papillary thyroid carcinoma T3N1 2.1 cm, status post total thyroidectomy at [facility] by Dr. Daugherty, in October 2011.  She had metastatic carcinoma in 6 of 7 lymph nodes level VI nodes with  vascular and lymphatic invasion.  2 parathyroid glands were removed right inferior and right superior.  Thyrogen stimulated ablation February 2012 with 167 mCi with a TG of 3.6.  Neck ultrasound in October was negative, initial TG was 0.8, increased to  1.1.  Thyrogen stimulated TG was 40 with a negative whole-body scan.  She had neck ultrasound at [facility] in December which was negative for recurrent disease.  PET scan showed uptake in one axillary node in the mediastinum.  She saw Dr. Collins at  [facility],       November 2011: TSH 1.3, TG 6.6 (postop, PreI-131)   May 31, 2012, TSH less than 0.01, TG 0.8, TG antibody less than 20   October 16, 2012: TG 1.1, TG antibody less than 20, TSH 0.042   November 30, 2012: Thyrogen stimulated TG 40, TG antibody less than 20   January 18, 2013: TG 1.5, TG antibody less than 20, TSH 0.019 (on Synthroid 125 mcg)   April 2013: TG 1.5, TSH 0.45   February 2015: TG 2.2, TSH 0.3   September 2015: TSH 0.08, TG 4.6   2016: TG 6.8, TSH 0.495      Thyrogen whole-body scan: January 31, 2012   Anterior and lateral pinhole images of the neck reveal 3 small round foci of tracer uptake within

## 2024-03-26 LAB — B-HCG SERPL QL: NEGATIVE MIU/ML

## 2024-04-01 ENCOUNTER — HOSPITAL ENCOUNTER (OUTPATIENT)
Facility: HOSPITAL | Age: 39
Setting detail: INFUSION SERIES
Discharge: HOME | End: 2024-04-01
Payer: COMMERCIAL

## 2024-04-01 VITALS
DIASTOLIC BLOOD PRESSURE: 68 MMHG | SYSTOLIC BLOOD PRESSURE: 103 MMHG | HEART RATE: 84 BPM | RESPIRATION RATE: 16 BRPM | TEMPERATURE: 97 F

## 2024-04-01 DIAGNOSIS — C73 THYROID CANCER (HCC): Primary | ICD-10-CM

## 2024-04-01 PROCEDURE — 6360000002 HC RX W HCPCS: Performed by: INTERNAL MEDICINE

## 2024-04-01 PROCEDURE — 96372 THER/PROPH/DIAG INJ SC/IM: CPT

## 2024-04-01 PROCEDURE — 2580000003 HC RX 258: Performed by: INTERNAL MEDICINE

## 2024-04-01 RX ADMIN — THYROTROPIN ALFA 0.9 MG: 0.9 INJECTION, POWDER, FOR SOLUTION INTRAMUSCULAR at 10:39

## 2024-04-01 NOTE — PROGRESS NOTES
Outpatient Infusion Center - Chemotherapy Progress Note    1030 Pt admit to [facility] for Thyrogen 1/2 ambulatory in stable condition. Assessment completed. No new concerns voiced.       /68   Pulse 84   Temp 97 °F (36.1 °C) (Temporal)   Resp 16     Medications Administered         thyrotropin chang (THYROGEN) 0.9 mg in sterile water 1 mL injection Admin Date  04/01/2024 Action  Given Dose  0.9 mg Route  IntraMUSCular Administered By  Cass Meneses RN          (IM R glut)         1042 Pt tolerated treatment well. D/c home ambulatory in no distress. Pt aware of next [facility] appointment scheduled for 04/02/2024.

## 2024-04-02 ENCOUNTER — HOSPITAL ENCOUNTER (OUTPATIENT)
Facility: HOSPITAL | Age: 39
Setting detail: INFUSION SERIES
Discharge: HOME | End: 2024-04-02
Payer: COMMERCIAL

## 2024-04-02 VITALS
DIASTOLIC BLOOD PRESSURE: 66 MMHG | SYSTOLIC BLOOD PRESSURE: 106 MMHG | RESPIRATION RATE: 16 BRPM | TEMPERATURE: 97.2 F | HEART RATE: 80 BPM

## 2024-04-02 DIAGNOSIS — C73 THYROID CANCER (HCC): Primary | ICD-10-CM

## 2024-04-02 PROCEDURE — 96372 THER/PROPH/DIAG INJ SC/IM: CPT

## 2024-04-02 PROCEDURE — 2580000003 HC RX 258: Performed by: INTERNAL MEDICINE

## 2024-04-02 PROCEDURE — 6360000002 HC RX W HCPCS: Performed by: INTERNAL MEDICINE

## 2024-04-02 RX ADMIN — THYROTROPIN ALFA 0.9 MG: 0.9 INJECTION, POWDER, FOR SOLUTION INTRAMUSCULAR at 10:49

## 2024-04-02 NOTE — PROGRESS NOTES
[facility] Short Note                       Date: 2024    Name: Isa Mcclellan    MRN: [id number]         : 1985      1100 Pt admit to [facility] for Thyrogen 2/2  ambulatory in stable condition. Assessment completed. No new concerns voiced.      Ms. Mcclellan's vitals were reviewed     Patient Vitals for the past 12 hrs:   Temp Pulse Resp BP   24 1015 97.2 °F (36.2 °C) 80 16 106/66         Medications given:   Medications Administered         thyrotropin chang (THYROGEN) 0.9 mg in sterile water 1 mL injection Admin Date  2024 Action  Given Dose  0.9 mg Route  IntraMUSCular Administered By  Ly Meredith RN              Ms. Mcclellan tolerated the injection L glute, and had no complaints.    Ms. Mcclellan [facility]. Pt aware of next appt    Future Appointments   Date Time Provider Department Center   2024 11:00 AM H1 SHANNA FASTRACK [facility]   4/3/2024  9:00 AM [facility] NM INJ RM 1 [facility]   2024  1:00 PM [facility] NM RM 3 [facility]   2024  9:30 AM SPEC CDE LAB CDE BS AMB   2024  9:30 AM Penny Cruz MD CDE BS AMB       Ly Meredith RN  2024  10:53 AM

## 2024-04-03 ENCOUNTER — HOSPITAL ENCOUNTER (OUTPATIENT)
Facility: HOSPITAL | Age: 39
Discharge: HOME | End: 2024-04-06
Attending: INTERNAL MEDICINE
Payer: COMMERCIAL

## 2024-04-03 DIAGNOSIS — C73 PAPILLARY THYROID CARCINOMA (HCC): ICD-10-CM

## 2024-04-03 PROCEDURE — A9528 IODINE I-131 IODIDE CAP, DX: HCPCS | Performed by: INTERNAL MEDICINE

## 2024-04-03 PROCEDURE — 78018 THYROID MET IMAGING BODY: CPT

## 2024-04-03 PROCEDURE — 3430000000 HC RX DIAGNOSTIC RADIOPHARMACEUTICAL: Performed by: INTERNAL MEDICINE

## 2024-04-03 RX ADMIN — SODIUM IODIDE I 131 10.1 MICRO CURIE: 100 CAPSULE ORAL at 08:25

## 2024-04-05 ENCOUNTER — HOSPITAL ENCOUNTER (OUTPATIENT)
Facility: HOSPITAL | Age: 39
Discharge: HOME | End: 2024-04-08
Attending: INTERNAL MEDICINE

## 2024-04-06 LAB — SPECIMEN STATUS REPORT: NORMAL

## 2024-04-08 LAB — TSH SERPL DL<=0.005 MIU/L-ACNC: 25.4 UIU/ML (ref 0.45–4.5)

## 2024-04-10 LAB
B-HCG SERPL QL: NEGATIVE MIU/ML
THYROGLOB AB SERPL-ACNC: <1 IU/ML
THYROGLOB SERPL-MCNC: 55.4 NG/ML
THYROGLOB SERPL-MCNC: ABNORMAL NG/ML

## 2024-05-09 LAB
HCV GENTYP SERPL NAA+PROBE: NORMAL
LABORATORY COMMENT REPORT: NORMAL

## 2024-05-18 NOTE — PROGRESS NOTES
[facility] Progress Note     Elena Butler Patient Status:  Inpatient    1950 MRN [id number]   Location [facility] Attending Lazaro Hernandez MD   Hosp Day # 9 PCP Maykel Doe,      Subjective:      Pt seen in bedside chair eating breakfast, Family at bedside. Endorsed general weakness, headache, and right leg pain/numbness. Unable to move into bed on own. Walked ~100 ft yesterday and had associated SOB. Denies coughing or SOB when laying flat.    Per RN, patient was experiencing some nausea yesterday and has had trouble with bowel movement.  Patient is unable to pull himself up during physical therapy.    Review of Systems  General: No fever or chills, No loss of appetite.    RS: No hemoptysis  GI: No melena or hematochezia  : No urinary disturbance  Derm: No skin disorders   Heme: No blood dyscrasias.  Remainder of 12 point systems reviewed and negative (or as mentioned in HPI)    Objective:     Medications:  Current Facility-Administered Medications   Medication Dose Route Frequency Provider Last Rate Last Admin    furosemide (LASIX) tablet 40 mg  40 mg Oral BID Trina Villatoro CNS   40 mg at 24 1733    polyethylene glycol (MIRALAX) packet 17 g  17 g Oral Daily Lazaro Hernandez MD   17 g at 24 0934    metoPROLOL tartrate (LOPRESSOR) tablet 25 mg  25 mg Oral 2 times per day Rikki Tejeda MD   25 mg at 24 1428    insulin lispro (ADMELOG,HumaLOG) - Correction Dose   Subcutaneous TID WC Michaela Hein NP   1 Units at 05/15/24 1305    insulin lispro (ADMELOG,HumaLOG) - Correction Dose   Subcutaneous Nightly Michaela Hein NP        AMIODarone (PACERONE) tablet 400 mg  400 mg Oral 2 times per day Michaela Hein NP   400 mg at 24    famotidine (PEPCID) tablet 20 mg  20 mg Oral 2 times per day Bam Torres MD   20 mg at 24    aspirin chewable 324 mg  324 mg Oral Daily Ishan Islas PA   324 mg at 24  1910 Bedside and Verbal shift change report given to KELLY Nichols RN (oncoming nurse) by Ingrid Zhao RN (offgoing nurse). Report included the following information SBAR, Kardex, Intake/Output, MAR and Recent Results. 1925 NST reactive confirmed with Bryce Brain RN. EFM removed    2215 pt sleeping at this time    2235 Bedside and Verbal shift change report given to IVY Saucedo RN and RITO Yu student nurse (oncoming nurse) by KELLY Nichols RN (offgoing nurse). Report included the following information SBAR, Kardex, Intake/Output, MAR and Recent Results. 0934    atorvastatin (LIPITOR) tablet 40 mg  40 mg Oral Nightly Ishan Islas PA   40 mg at 05/17/24 2048    docusate sodium-sennosides (SENOKOT S) 50-8.6 MG 2 tablet  2 tablet Oral BID Ishan Islas PA   2 tablet at 05/17/24 2048    heparin (porcine) injection 5,000 Units  5,000 Units Subcutaneous 3 times per day Ishan Islas PA   5,000 Units at 05/18/24 0608    carbidopa-levodopa (SINEMET)  MG per tablet 1 tablet  1 tablet Oral TID Evangelista Sinha MD   1 tablet at 05/17/24 2047    tamsulosin (FLOMAX) capsule 0.4 mg  0.4 mg Oral QHS Evangelista Sinha MD   0.4 mg at 05/17/24 2220      Current Facility-Administered Medications   Medication Dose Route Frequency Provider Last Rate Last Admin      Current Facility-Administered Medications   Medication Dose Route Frequency Provider Last Rate Last Admin    HYDROcodone-acetaminophen (NORCO) 5-325 MG per tablet 1 tablet  1 tablet Oral Q4H PRN Ishan Islas PA   1 tablet at 05/15/24 1304    Or    oxyCODONE-acetaminophen (PERCOCET) 5-325 MG tablet 1 tablet  1 tablet Oral Q4H PRN Ishan Islas PA        potassium CHLORIDE (KLOR-CON) packet 40 mEq  40 mEq Oral PRN Ishan Islas PA   40 mEq at 05/15/24 0505    potassium CHLORIDE (KLOR-CON) packet 20 mEq  20 mEq Oral PRN Ishan Islas PA        bisacodyl (DULCOLAX) suppository 10 mg  10 mg Rectal Daily PRN Ishan Islas PA   10 mg at 05/16/24 0456    magnesium hydroxide (MILK OF MAGNESIA) 400 MG/5ML suspension 30 mL  30 mL Oral Q12H PRN Ishan Islas PA   30 mL at 05/16/24 1803    sodium biphosphate (FLEET) enema  1 enema Rectal Once PRN Ishan Islas PA        dextrose 50 % injection 25 g  25 g Intravenous PRN Ishan Islas PA        dextrose 50 % injection 12.5 g  12.5 g Intravenous PRN Ishan Islas PA        glucagon (GLUCAGEN) injection 1 mg  1 mg Intramuscular PRN Ishan Islas PA        dextrose (GLUTOSE) 40 % gel 15 g  15 g Oral PRN  Ishan Islas PA        dextrose (GLUTOSE) 40 % gel 30 g  30 g Oral PRN Ishan Islas PA        acetaminophen (TYLENOL) tablet 650 mg  650 mg Oral Q6H PRN Ishan Islas PA   650 mg at 05/16/24 0456    Or    acetaminophen (TYLENOL) suppository 650 mg  650 mg Rectal Q6H PRN Ishan Islas PA          Allergies:   ALLERGIES:   Allergen Reactions    Contrast Media RASH    Latex RASH    Iodinated Diagnostic Agents RASH      Physical Exam:  Vital Last Value 24 Hour Range   Temperature 99.6 °F (37.6 °C) (05/18/24 0400) Temp  Min: 97.5 °F (36.4 °C)  Max: 99.6 °F (37.6 °C)   Pulse 71 (05/18/24 0400) Pulse  Min: 66  Max: 74   Respiratory 16 (05/18/24 0400) Resp  Min: 16  Max: 16   Non-Invasive  Blood Pressure 98/51 (05/18/24 0400) BP  Min: 96/61  Max: 130/66   Pulse Oximetry 94 % (05/18/24 0400) SpO2  Min: 93 %  Max: 97 %   Arterial   Blood Pressure 132/53 (05/15/24 0920) No data recorded     Tele: SR 60s    Intake/Output:     Intake/Output Summary (Last 24 hours) at 5/18/2024 0806  Last data filed at 5/18/2024 0600  Gross per 24 hour   Intake 1560 ml   Output 1730 ml   Net -170 ml     Weight    05/13/24 0500 05/15/24 0424 05/17/24 0525 05/18/24 0500   Weight: 82.2 kg (181 lb 3.5 oz) 89.9 kg (198 lb 3.1 oz) 89 kg (196 lb 3.4 oz) 86 kg (189 lb 9.5 oz)      Physical Exam  Vitals reviewed.   Constitutional:       Appearance: Normal appearance. She is normal weight.   HENT:      Head: Normocephalic and atraumatic.      Mouth/Throat:      Pharynx: No oropharyngeal exudate or posterior oropharyngeal erythema.   Neck:      Vascular: No carotid bruit.   Cardiovascular:      Rate and Rhythm: Normal rate and regular rhythm.      Popliteal Pulses: Normal pulses.      Heart sounds: Normal heart sounds. No murmur heard.  No friction rub. No gallop.    Pulmonary:      Effort: Pulmonary effort is normal. No respiratory distress.      Breath sounds: Normal breath sounds.   Abdominal:      General: Abdomen is flat.  Bowel sounds are normal. There is no distension.      Palpations: Abdomen is soft.      Tenderness: There is no rebound.   Musculoskeletal:      Cervical back: Normal range of motion and neck supple. No rigidity.   Skin:     Capillary Refill: Capillary refill takes less than 2 seconds.   Neurological:      General: No focal deficit present.      Mental Status: She is alert and oriented to person, place, and time.      Cranial Nerves: No cranial nerve deficit.   Psychiatric:         Mood and Affect: Mood normal.         Behavior: Behavior normal.     Clinical Data:   (PERSONALLY REVIEWED)    Labs    CBC  Recent Labs   Lab 05/18/24  0522 05/17/24  0511 05/16/24  0530   WBC 10.0 7.9 10.0   HCT 26.1* 25.5* 25.6*   HGB 8.7* 8.6* 8.5*    178 141     CMP  Recent Labs   Lab 05/18/24  0522 05/17/24  0510 05/16/24  0530   SODIUM 133* 136 133*   POTASSIUM 3.5 3.7 3.9   CHLORIDE 97 98 98   CO2 31 33* 31   GLUCOSE 117* 112* 130*   BUN 22* 21* 21*   CREATININE 1.00 0.83 0.86   CALCIUM 8.7 8.7 8.5   TOTPROTEIN 5.7* 6.0* 5.9*   ALBUMIN 2.9* 2.9* 3.2*   BILIRUBIN 1.6* 2.0* 1.8*   AST 18 18 26   GPT 13 8 13   ALKPT 60 54 53     Cardiac Labs  Recent Labs   Lab 05/16/24  0530 05/15/24  0328 05/14/24  0355   NTPROB 2,620* 1,659* 1,310*     Lipid Panel  No results found    Coags  Recent Labs   Lab 05/18/24  0522 05/17/24  0511 05/16/24  0530 05/15/24  0328 05/14/24  0355 05/13/24  1641   INR  --   --  1.0  --  1.1 1.2   PTT 27 29 32   < > 27 25    < > = values in this interval not displayed.     ABG  Recent Labs   Lab 05/14/24  0002 05/13/24  2102 05/13/24  1659   RAPH 7.42 7.35 7.35   RAPCO2 40 45 47   RAPO2 177* 175* 210*   RAHCO3 26 25 26   RASAT 100* 100* 100*     Imaging  ECG:   Encounter Date: 05/09/24   Electrocardiogram 12-Lead   Result Value    Ventricular Rate EKG/Min (BPM) 71    Atrial Rate (BPM) 88    QRS-Interval (MSEC) 82    QT-Interval (MSEC) 380    QTc 413    R Axis (Degrees) 17    T Axis (Degrees) 44    REPORT  TEXT      Atrial fibrillation  Nonspecific ST and T wave abnormality  Confirmed by TE KHAN, ELSIE ([id number]) on 5/15/2024 6:37:36 AM        EKG  As per my interpretation:  Sinus Bradycardia and sinus arrhythmia. 1st degree AV block.     Echocardiogram:    TTE 05/10/2024   SUMMARY:  1. Left ventricle: The cavity size is normal. Wall thickness is normal. Systolic function is normal. The ejection fraction was measured by single  plane method of disks. The ejection fraction is 64%.  2. Left atrium: The atrium is mildly dilated.  3. Right ventricle: The cavity size is normal. Systolic function is normal.    Stress Test 3/6/24:  Impressions:  1. Abnormal study after pharmacologic stress.  2. There is evidence of myocardial ischemia.      Cardiac cath:   Results for orders placed or performed during the hospital encounter of 05/09/24   Cath/PV Case    Narrative      Mid RCA to Dist RCA lesion with 100% stenosis.    Dist LM lesion with 20% stenosis.    Ost LAD to Prox LAD lesion with 85% stenosis.    Mid LAD lesion with 99% stenosis.    1st Mrg-1 lesion with 60% stenosis.    1st Mrg-2 lesion with 50% stenosis.    Severe two vessel disease, and ( LAD 99%, %) and moderate LCX   disease.    Normal LVEDP.        Procedure Done:   Conscious Sedation  US guided access right Radial artery.   Catheter placement in right subclavian artery with imaging, necessary (   tortuosity and could not advance catheter from right radial artery access)   .   Right upper extremity angiogram.   LHC and coronary angiogram.         Recommendation:   Due to on going chest pain, I admitted patient to ICU for IV   nitroglycerin.   I consult CV surgery for planning CABG.  Patient was reluctant to have   CABG but after long discussion they were open to it .   Discontinue Plavix.   Start Patien ton Heparin GTT after 4 hours.     Evangelista Sinha MD       Assessment and Plan:      Elena Butler is a 73 year old male who presents for cardiac   Chest Pain and positive stress test.  He underwent cardiac cath showing complex subtotal LAD occlusion ( 99%) with TMI 2 flow, and  RCA, Also moderate LCX disease .  Decision was made to proceed with CABG he developed chest pain post cardiac cath. He was started on Nitro gtt with total relief of angina. CV surgeons contacted and agreed with plans.  He is hemodynamically stable.     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 atorvastatin, ASA.   -Continue lopressor 25 mg BID  - plan to decrease lasix oral to once a day tomorrow  -[facility] following     New onset Atrial Fibrillation  Noted postoperatively  Converted to SR 5/14  -CHADSVASc Stroke Risk Score = 2  -TSH wnl 5/2024   -Transition to Amiodarone 200 mg starting tomorrow  - continue metoprolol if blood pressure > 100 systolics  -monitor on tele   - prior was on xarelto for vascular disease and not afib  - consider evaluation for anticoagulation and recurrence of afib as outpatient      Stenosis of left carotid artery  -Mild to moderate.  No TIA or strokes. Will continue to monitor.   -continue statin     Polyarticular arthritis  -Patient on Tylenol and will continue to monitor.  Strictly advised the patient against use of NSAIDs.     Parkinson's disease  -Stable and managed well    Atherosclerosis of native artery of lower extremity with intermittent claudication (CMD)  -Seems to be  moderate, medical management.      Mixed hyperglyceridemia  -Recent Lipid profile 05/11/24: , HDL 62, LDL 57, TRIG 55.    -On atorvastatin.  -For current cardiac risk. Recommend LDL : < 70 mg/dl (\"very high\" risk for CHD) . Consider Repatha as out patient.     Discussed with Dr. Hernandez and the bedside RN.     Thank you for allowing us to participate in this patient's care.  Please do not hesitate to call with any questions or concerns.    On 5/18/2024, Faith QUIJANO scribed the services personally performed  by Dr. Vasquez    The documentation recorded by the scribe accurately and completely reflects the service(s) I personally performed and the decisions made by me.

## 2024-06-26 DIAGNOSIS — E66.9 NON MORBID OBESITY: ICD-10-CM

## 2024-06-27 RX ORDER — PHENTERMINE HYDROCHLORIDE 37.5 MG/1
TABLET ORAL
Qty: 90 TABLET | Refills: 1 | Status: SHIPPED | OUTPATIENT
Start: 2024-06-27 | End: 2024-12-27

## 2024-08-30 ENCOUNTER — LAB (OUTPATIENT)
Age: 39
End: 2024-08-30

## 2024-08-30 DIAGNOSIS — E89.0 POSTPROCEDURAL HYPOTHYROIDISM: ICD-10-CM

## 2024-08-30 DIAGNOSIS — C73 PAPILLARY THYROID CARCINOMA (HCC): ICD-10-CM

## 2024-08-30 DIAGNOSIS — E66.9 NON MORBID OBESITY: ICD-10-CM

## 2024-08-30 LAB
ANION GAP SERPL CALC-SCNC: 5 MMOL/L (ref 5–15)
BUN SERPL-MCNC: 18 MG/DL (ref 6–20)
BUN/CREAT SERPL: 25 (ref 12–20)
CALCIUM SERPL-MCNC: 9 MG/DL (ref 8.5–10.1)
CHLORIDE SERPL-SCNC: 109 MMOL/L (ref 97–108)
CO2 SERPL-SCNC: 26 MMOL/L (ref 21–32)
CREAT SERPL-MCNC: 0.73 MG/DL (ref 0.55–1.02)
GLUCOSE SERPL-MCNC: 87 MG/DL (ref 65–100)
POTASSIUM SERPL-SCNC: 4.6 MMOL/L (ref 3.5–5.1)
SODIUM SERPL-SCNC: 140 MMOL/L (ref 136–145)
T4 FREE SERPL-MCNC: 1.2 NG/DL (ref 0.8–1.5)
TSH SERPL DL<=0.05 MIU/L-ACNC: 1.44 UIU/ML (ref 0.36–3.74)

## 2024-09-06 ENCOUNTER — OFFICE VISIT (OUTPATIENT)
Age: 39
End: 2024-09-06
Payer: COMMERCIAL

## 2024-09-06 VITALS
OXYGEN SATURATION: 98 % | TEMPERATURE: 98.2 F | SYSTOLIC BLOOD PRESSURE: 108 MMHG | DIASTOLIC BLOOD PRESSURE: 65 MMHG | HEART RATE: 84 BPM | HEIGHT: 59 IN | BODY MASS INDEX: 32.04 KG/M2 | RESPIRATION RATE: 16 BRPM | WEIGHT: 158.9 LBS

## 2024-09-06 DIAGNOSIS — C73 PAPILLARY THYROID CARCINOMA (HCC): Primary | ICD-10-CM

## 2024-09-06 DIAGNOSIS — E03.9 PRIMARY HYPOTHYROIDISM: ICD-10-CM

## 2024-09-06 DIAGNOSIS — E66.9 NON MORBID OBESITY: ICD-10-CM

## 2024-09-06 DIAGNOSIS — L70.9 ACNE, UNSPECIFIED ACNE TYPE: ICD-10-CM

## 2024-09-06 PROCEDURE — 99215 OFFICE O/P EST HI 40 MIN: CPT | Performed by: INTERNAL MEDICINE

## 2024-09-06 RX ORDER — CLINDAMYCIN PHOSPHATE AND BENZOYL PEROXIDE 10; 50 MG/G; MG/G
GEL TOPICAL
COMMUNITY
Start: 2024-09-04

## 2024-09-06 RX ORDER — MELATONIN 10 MG
TABLET, SUBLINGUAL SUBLINGUAL
COMMUNITY

## 2024-09-06 NOTE — PROGRESS NOTES
Isa Mcclellan is a 39 y.o. female here for   Chief Complaint   Patient presents with    Thyroid Problem       All medication reviewed.     Vitals:    09/06/24 0907   BP: 108/65   Site: Left Upper Arm   Position: Sitting   Cuff Size: Medium Adult   Pulse: 84   Resp: 16   Temp: 98.2 °F (36.8 °C)   TempSrc: Temporal   SpO2: 98%   Weight: 72.1 kg (158 lb 14.4 oz)   Height: 1.499 m (4' 11\")        1. Have you been to the ER, urgent care clinic since your last visit?  Hospitalized since your last visit? -No    2. Have you seen or consulted any other health care providers outside of the [facility] since your last visit?  Include any pap smears or colon screening.-Seen by dr. Zaldivar/ for UTI      
nodes.   She is on 150 mcg the longest, celiac was negative in the past               Wt Readings from Last 3 Encounters:   09/06/24 72.1 kg (158 lb 14.4 oz)   03/01/24 70.3 kg (155 lb)   11/20/23 67.5 kg (148 lb 12.8 oz)        Past Medical History:   Diagnosis Date    Abnormal Pap smear of cervix 10/05/2017; 10/30/18    Negative, HR HPV+, 16+, 18 negative; TYLER/Neg HPV    Anxiety     Arthritis     Bursitis     and scapulothoracic dyskinesia, right shoulder    Fatigue     Headache     History of colposcopy 12/21/2018    Benign    History of endometrial biopsy 12/21/2018    Benign    Joint pain     Kidney stones     Muscle pain     Pap smear for cervical cancer screening 05/14/2014; 11/4/2019    negative; negative, HPV negative    Psychiatric problem     Anxiety    Shoulder bursitis     right    Thyroid cancer (HCC) 1/10/2013    Complete Thyroidectomy 2012.    Vitamin D deficiency 2021       Current Outpatient Medications   Medication Sig    Clindamycin-Benzoyl Per, Refr, 1.2-5 % GEL APPLY THIN LAYER TOPICALLY ONCE DAILY FOR 30 DAYS    Cholecalciferol (VITAMIN D3) 250 MCG (37044 UT) TABS Take by mouth    phentermine (ADIPEX-P) 37.5 MG tablet TAKE 1 TABLET BY MOUTH ONCE DAILY MAX  DAILY  AMOUNT  37.5  MG    amitriptyline (ELAVIL) 50 MG tablet Take 1 tablet by mouth nightly    butalbital-APAP-caffeine -40 MG CAPS per capsule Take 1 capsule by mouth every 4 hours as needed    SYNTHROID 150 MCG tablet TAKE 1 TABLET BY MOUTH MON-Fri, 1/2 TAB ON Sat and none on Sundays DORI    ibuprofen (ADVIL;MOTRIN) 600 MG tablet Take 1 tablet by mouth every 6 hours as needed     No current facility-administered medications for this visit.           Physical Examination:     General: pleasant, no distress, good eye contact   HEENT: no exophthalmos, no periorbital edema, EOMI   Neck: No  neck mass   RS: Normal respiratory effort   Musculoskeletal: no tremors   Neurological: alert and oriented   Psychiatric: normal mood and affect

## 2024-09-11 LAB
THYROGLOBULIN (ICMA): 10.4 NG/ML
THYROGLOBULIN (TG-RIA): NORMAL NG/ML
THYROGLOBULIN AB: <1 IU/ML

## 2024-09-12 LAB — TESTOST SERPL-MCNC: 26 NG/DL (ref 10–55)

## 2024-09-15 DIAGNOSIS — C73 PAPILLARY THYROID CARCINOMA (HCC): Primary | ICD-10-CM

## 2024-09-15 DIAGNOSIS — C73 PRIMARY MALIGNANT NEOPLASM OF THYROID WITH METASTASIS TO OTHER SITE (HCC): ICD-10-CM

## 2024-09-17 ENCOUNTER — TRANSCRIBE ORDERS (OUTPATIENT)
Facility: HOSPITAL | Age: 39
End: 2024-09-17

## 2024-09-17 DIAGNOSIS — N63.20 MASS OF LEFT BREAST, UNSPECIFIED QUADRANT: Primary | ICD-10-CM

## 2024-09-20 ENCOUNTER — TRANSCRIBE ORDERS (OUTPATIENT)
Facility: HOSPITAL | Age: 39
End: 2024-09-20

## 2024-09-20 DIAGNOSIS — N63.20 MASS OF LEFT BREAST, UNSPECIFIED QUADRANT: Primary | ICD-10-CM

## 2024-09-27 ENCOUNTER — HOSPITAL ENCOUNTER (OUTPATIENT)
Facility: HOSPITAL | Age: 39
Discharge: HOME | End: 2024-09-30
Attending: INTERNAL MEDICINE
Payer: COMMERCIAL

## 2024-09-27 DIAGNOSIS — C73 PRIMARY MALIGNANT NEOPLASM OF THYROID WITH METASTASIS TO OTHER SITE (HCC): ICD-10-CM

## 2024-09-27 DIAGNOSIS — C73 PAPILLARY THYROID CARCINOMA (HCC): ICD-10-CM

## 2024-09-27 PROCEDURE — 70490 CT SOFT TISSUE NECK W/O DYE: CPT

## 2024-09-27 PROCEDURE — 71250 CT THORAX DX C-: CPT

## 2024-10-03 DIAGNOSIS — C73 PAPILLARY THYROID CARCINOMA (HCC): Primary | ICD-10-CM

## 2024-10-03 DIAGNOSIS — C73 MALIGNANT NEOPLASM OF THYROID GLAND (HCC): ICD-10-CM

## 2024-10-03 NOTE — PROGRESS NOTES
Discussed treatment options, opted Thyrogen induced iodine treatment, she has received total of 367 mCi of iodine treatment so far      MONDAY: Report to [facility] to receive first injection of Thyrogen. * Before you receive this injection, ask the nurse to check with the nuclear medicine team to make sure that the Wednesday iodine tablet is prepared for you. Do not receive this injection if the nuclear medicine team is not ready to provide the radioactive iodine tablet on Wednesday.     TUESDAY: Report to the [facility] ([facility]) to receive second injection of Thyrogen.      WEDNESDAY: Plan accordingly with the Nuclear Medicine Department to receive a Treatment dose of radioactive iodine (JOSEPH) which will aid in detecting and destroying any remaining thyroid cells. This should be received on this day unless otherwise advised by the Nuclear Medicine team of a different date/time. More than likely this will take place at the [facility], [place] location.        FRIDAY : Plan to have thyroid labs drawn - thyroglobulin , labs ordered       1 Week later you will have a whole body scan to determine if there is any residual thyroid tissue in the body. This should be exactly 1 week after the day you received the radioactive iodine. This will also be scheduled by the same nuclear medicine department and also will likely take place at the [facility], [place] location.         Be advised that although your endocrinologist will request and coordinate the week, it will be important for you to stay in touch with these departments (Outpatient infusion center & the [place] nuclear medicine department) to make certain that your appointments are lined up accordingly for everything to go smoothly. Discuss with them the above instructions provided to you to assure that everyone is planning on the same.      Thyrogen injection center number - [phone] , If you do not hear from the

## 2024-10-07 ENCOUNTER — TELEPHONE (OUTPATIENT)
Age: 39
End: 2024-10-07

## 2024-10-07 NOTE — TELEPHONE ENCOUNTER
Infusion needs clarification. They received order for patient to come on 14th and 15th but when patient was contacted they advised her should be 21 and 22nd. If so will need new order faxed    Fax is [phone]

## 2024-10-14 RX ORDER — FAMOTIDINE 10 MG/ML
20 INJECTION, SOLUTION INTRAVENOUS
Status: CANCELLED | OUTPATIENT
Start: 2024-10-21

## 2024-10-14 RX ORDER — ALBUTEROL SULFATE 90 UG/1
4 INHALANT RESPIRATORY (INHALATION) PRN
Status: CANCELLED | OUTPATIENT
Start: 2024-10-21

## 2024-10-14 RX ORDER — DIPHENHYDRAMINE HYDROCHLORIDE 50 MG/ML
50 INJECTION INTRAMUSCULAR; INTRAVENOUS
Status: CANCELLED | OUTPATIENT
Start: 2024-10-21

## 2024-10-14 RX ORDER — SODIUM CHLORIDE 9 MG/ML
INJECTION, SOLUTION INTRAVENOUS CONTINUOUS
Status: CANCELLED | OUTPATIENT
Start: 2024-10-21

## 2024-10-14 RX ORDER — EPINEPHRINE 1 MG/ML
0.3 INJECTION, SOLUTION, CONCENTRATE INTRAVENOUS PRN
Status: CANCELLED | OUTPATIENT
Start: 2024-10-21

## 2024-10-14 RX ORDER — ACETAMINOPHEN 325 MG/1
650 TABLET ORAL
Status: CANCELLED | OUTPATIENT
Start: 2024-10-21

## 2024-10-14 RX ORDER — ONDANSETRON 2 MG/ML
8 INJECTION INTRAMUSCULAR; INTRAVENOUS
Status: CANCELLED | OUTPATIENT
Start: 2024-10-21

## 2024-10-18 ENCOUNTER — LAB (OUTPATIENT)
Age: 39
End: 2024-10-18

## 2024-10-18 DIAGNOSIS — C73 PAPILLARY THYROID CARCINOMA (HCC): ICD-10-CM

## 2024-10-18 DIAGNOSIS — C73 MALIGNANT NEOPLASM OF THYROID GLAND (HCC): ICD-10-CM

## 2024-10-18 LAB — HCG SERPL QL: NEGATIVE

## 2024-10-21 ENCOUNTER — HOSPITAL ENCOUNTER (OUTPATIENT)
Facility: HOSPITAL | Age: 39
Setting detail: INFUSION SERIES
Discharge: HOME | End: 2024-10-21
Payer: COMMERCIAL

## 2024-10-21 VITALS
TEMPERATURE: 98 F | RESPIRATION RATE: 16 BRPM | HEART RATE: 78 BPM | DIASTOLIC BLOOD PRESSURE: 81 MMHG | SYSTOLIC BLOOD PRESSURE: 118 MMHG

## 2024-10-21 DIAGNOSIS — C73 THYROID CANCER (HCC): Primary | ICD-10-CM

## 2024-10-21 PROCEDURE — 6360000002 HC RX W HCPCS: Performed by: INTERNAL MEDICINE

## 2024-10-21 PROCEDURE — 2580000003 HC RX 258: Performed by: INTERNAL MEDICINE

## 2024-10-21 PROCEDURE — 96372 THER/PROPH/DIAG INJ SC/IM: CPT

## 2024-10-21 RX ORDER — ONDANSETRON 2 MG/ML
8 INJECTION INTRAMUSCULAR; INTRAVENOUS
Status: CANCELLED | OUTPATIENT
Start: 2024-10-22

## 2024-10-21 RX ORDER — DIPHENHYDRAMINE HYDROCHLORIDE 50 MG/ML
50 INJECTION INTRAMUSCULAR; INTRAVENOUS
Status: CANCELLED | OUTPATIENT
Start: 2024-10-22

## 2024-10-21 RX ORDER — SODIUM CHLORIDE 9 MG/ML
INJECTION, SOLUTION INTRAVENOUS CONTINUOUS
Status: CANCELLED | OUTPATIENT
Start: 2024-10-22

## 2024-10-21 RX ORDER — ALBUTEROL SULFATE 90 UG/1
4 INHALANT RESPIRATORY (INHALATION) PRN
Status: CANCELLED | OUTPATIENT
Start: 2024-10-22

## 2024-10-21 RX ORDER — ACETAMINOPHEN 325 MG/1
650 TABLET ORAL
Status: CANCELLED | OUTPATIENT
Start: 2024-10-22

## 2024-10-21 RX ORDER — EPINEPHRINE 1 MG/ML
0.3 INJECTION, SOLUTION INTRAMUSCULAR; SUBCUTANEOUS PRN
Status: CANCELLED | OUTPATIENT
Start: 2024-10-22

## 2024-10-21 RX ADMIN — THYROTROPIN ALFA 0.9 MG: 0.9 INJECTION, POWDER, FOR SOLUTION INTRAMUSCULAR at 13:41

## 2024-10-21 ASSESSMENT — PAIN SCALES - GENERAL: PAINLEVEL_OUTOF10: 0

## 2024-10-21 NOTE — PROGRESS NOTES
[facility] Progress Note    Date: 2024    Name: Isa Mcclellan    MRN: [id number]         : 1985    Ms. Mcclellan Arrived ambulatory and in no distress for Thyrogen D1.      Assessment was completed and documented in flowsheets. No acute concerns at this time.     Ms. Mcclellan's vital signs for this visit.  Vitals:    10/21/24 1336   BP: 118/81   Pulse: 78   Resp: 16   Temp: 98 °F (36.7 °C)          Medications given:   Medications Administered         thyrotropin chang (THYROGEN) 0.9 mg in sterile water 1 mL injection Admin Date  10/21/2024 Action  Given Dose  0.9 mg Route  IntraMUSCular Documented By  Yenny Anderson, RN        Given IM in the left glute    Ms. Mcclellan tolerated the injection, and had no complaints.    Ms. Mcclellan was discharged [facility] in stable condition.     Future Appointments   Date Time Provider Department Center   10/22/2024  2:00 PM SHANNA FASTTRACK 1 [facility]   10/23/2024  9:00 AM [facility] NM CONSULT 1 [facility]   10/30/2024 10:00 AM SPEC CDE LAB CDE BS AMB   10/30/2024 12:00 PM [facility] NM RM 3 [facility]   2024  9:05 AM [facility] NEELAM 5 [facility]   2024  9:30 AM [facility] NEELAM US 2 [facility]   3/7/2025  9:30 AM SPEC CDE LAB CDE BS AMB   3/14/2025  9:30 AM Penny Cruz MD CDE BS AMB       Yenny Anderson RN  2024  2:02 PM

## 2024-10-22 ENCOUNTER — HOSPITAL ENCOUNTER (OUTPATIENT)
Facility: HOSPITAL | Age: 39
Setting detail: INFUSION SERIES
Discharge: HOME | End: 2024-10-22
Payer: COMMERCIAL

## 2024-10-22 VITALS
RESPIRATION RATE: 16 BRPM | SYSTOLIC BLOOD PRESSURE: 116 MMHG | TEMPERATURE: 98.7 F | HEART RATE: 68 BPM | DIASTOLIC BLOOD PRESSURE: 74 MMHG

## 2024-10-22 DIAGNOSIS — C73 THYROID CANCER (HCC): Primary | ICD-10-CM

## 2024-10-22 PROCEDURE — 2580000003 HC RX 258: Performed by: INTERNAL MEDICINE

## 2024-10-22 PROCEDURE — 6360000002 HC RX W HCPCS: Performed by: INTERNAL MEDICINE

## 2024-10-22 PROCEDURE — 96372 THER/PROPH/DIAG INJ SC/IM: CPT

## 2024-10-22 RX ORDER — SODIUM CHLORIDE 9 MG/ML
INJECTION, SOLUTION INTRAVENOUS CONTINUOUS
OUTPATIENT
Start: 2024-10-22

## 2024-10-22 RX ORDER — ALBUTEROL SULFATE 90 UG/1
4 INHALANT RESPIRATORY (INHALATION) PRN
OUTPATIENT
Start: 2024-10-22

## 2024-10-22 RX ORDER — EPINEPHRINE 1 MG/ML
0.3 INJECTION, SOLUTION INTRAMUSCULAR; SUBCUTANEOUS PRN
OUTPATIENT
Start: 2024-10-22

## 2024-10-22 RX ORDER — ACETAMINOPHEN 325 MG/1
650 TABLET ORAL
OUTPATIENT
Start: 2024-10-22

## 2024-10-22 RX ORDER — DIPHENHYDRAMINE HYDROCHLORIDE 50 MG/ML
50 INJECTION INTRAMUSCULAR; INTRAVENOUS
OUTPATIENT
Start: 2024-10-22

## 2024-10-22 RX ORDER — ONDANSETRON 2 MG/ML
8 INJECTION INTRAMUSCULAR; INTRAVENOUS
OUTPATIENT
Start: 2024-10-22

## 2024-10-22 RX ADMIN — THYROTROPIN ALFA 0.9 MG: 0.9 INJECTION, POWDER, FOR SOLUTION INTRAMUSCULAR at 13:05

## 2024-10-22 ASSESSMENT — PAIN DESCRIPTION - LOCATION: LOCATION: HEAD

## 2024-10-22 ASSESSMENT — PAIN DESCRIPTION - ORIENTATION: ORIENTATION: ANTERIOR

## 2024-10-22 ASSESSMENT — PAIN SCALES - GENERAL: PAINLEVEL_OUTOF10: 5

## 2024-10-22 ASSESSMENT — PAIN DESCRIPTION - DESCRIPTORS: DESCRIPTORS: ACHING

## 2024-10-22 NOTE — PROGRESS NOTES
[facility] Visit Notes                 Date: 2024  Name: Isa Mcclellan  MRN: [id number]       : 1985    Pt admit to [facility] for Thyrogen - ambulatory in stable condition.   Denies flu-like symptoms. Endorses headache.     Injection given Right Ventrogluteal IM   Tolerated procedure well without issue. Band-aid and Gauze Applied to site.   Patient aware of upcoming appointment. Patient declined post- monitoring time. Education provided.     Ms. Mcclellan's vitals were reviewed prior to treatment.   Patient Vitals for the past 12 hrs:   Temp Pulse Resp BP   10/22/24 1235 98.7 °F (37.1 °C) 68 16 116/74     Medications Administered         thyrotropin chang (THYROGEN) 0.9 mg in sterile water 1 mL injection Admin Date  10/22/2024 Action  Given Dose  0.9 mg Route  IntraMUSCular Documented By  Patricia Flynn, RN          Future Appointments   Date Time Provider Department Center   10/22/2024  2:00 PM SHANNA FASTTRACK 1 [facility]   10/23/2024  9:00 AM [facility] NM CONSULT 1 [facility]   10/30/2024 10:00 AM SPEC CDE LAB CDE BS [facility]   10/30/2024 12:00 PM [facility] NM RM 3 [facility]   2024  9:05 AM [facility] NEELAM 5 [facility]   2024  9:30 AM [facility] NEELAM US 2 [facility]   3/7/2025  9:30 AM SPEC CDE LAB CDE BS [facility]   3/14/2025  9:30 AM Penny Cruz MD CDE BS [facility]     Patricia Flynn RN  2024

## 2024-10-23 ENCOUNTER — HOSPITAL ENCOUNTER (OUTPATIENT)
Facility: HOSPITAL | Age: 39
Discharge: HOME | End: 2024-10-26
Attending: INTERNAL MEDICINE
Payer: COMMERCIAL

## 2024-10-23 DIAGNOSIS — C73 MALIGNANT NEOPLASM OF THYROID GLAND (HCC): ICD-10-CM

## 2024-10-23 DIAGNOSIS — C73 PAPILLARY THYROID CARCINOMA (HCC): ICD-10-CM

## 2024-10-23 PROCEDURE — 79005 NUCLEAR RX ORAL ADMIN: CPT

## 2024-10-23 PROCEDURE — A9517 I131 IODIDE CAP, RX: HCPCS | Performed by: INTERNAL MEDICINE

## 2024-10-23 PROCEDURE — 3430000000 HC RX DIAGNOSTIC RADIOPHARMACEUTICAL: Performed by: INTERNAL MEDICINE

## 2024-10-23 RX ADMIN — SODIUM IODIDE I 131 152600 MICRO CURIE: 1 CAPSULE, GELATIN COATED ORAL at 09:00

## 2024-10-24 LAB
THYROGLOBULIN (ICMA): 13.8 NG/ML
THYROGLOBULIN (TG-RIA): NORMAL NG/ML
THYROGLOBULIN AB: <1 IU/ML

## 2024-10-30 ENCOUNTER — LAB (OUTPATIENT)
Age: 39
End: 2024-10-30

## 2024-10-30 ENCOUNTER — HOSPITAL ENCOUNTER (OUTPATIENT)
Facility: HOSPITAL | Age: 39
Discharge: HOME | End: 2024-11-02
Attending: INTERNAL MEDICINE
Payer: COMMERCIAL

## 2024-10-30 DIAGNOSIS — E03.9 PRIMARY HYPOTHYROIDISM: ICD-10-CM

## 2024-10-30 DIAGNOSIS — C73 PAPILLARY THYROID CARCINOMA (HCC): ICD-10-CM

## 2024-10-30 DIAGNOSIS — E03.9 PRIMARY HYPOTHYROIDISM: Primary | ICD-10-CM

## 2024-10-30 DIAGNOSIS — C73 MALIGNANT NEOPLASM OF THYROID GLAND (HCC): ICD-10-CM

## 2024-10-30 LAB
T4 FREE SERPL-MCNC: 0.3 NG/DL (ref 0.8–1.5)
TSH SERPL DL<=0.05 MIU/L-ACNC: 62.6 UIU/ML (ref 0.36–3.74)

## 2024-10-30 PROCEDURE — 78018 THYROID MET IMAGING BODY: CPT

## 2024-11-01 ENCOUNTER — HOSPITAL ENCOUNTER (OUTPATIENT)
Facility: HOSPITAL | Age: 39
Discharge: HOME | End: 2024-11-04
Payer: COMMERCIAL

## 2024-11-01 VITALS — WEIGHT: 158 LBS | BODY MASS INDEX: 31.85 KG/M2 | HEIGHT: 59 IN

## 2024-11-01 DIAGNOSIS — C73 MALIGNANT NEOPLASM OF THYROID GLAND (HCC): ICD-10-CM

## 2024-11-01 DIAGNOSIS — N63.20 MASS OF LEFT BREAST, UNSPECIFIED QUADRANT: ICD-10-CM

## 2024-11-01 DIAGNOSIS — E03.9 PRIMARY HYPOTHYROIDISM: ICD-10-CM

## 2024-11-01 PROCEDURE — G0279 TOMOSYNTHESIS, MAMMO: HCPCS

## 2024-11-01 PROCEDURE — 73502 X-RAY EXAM HIP UNI 2-3 VIEWS: CPT

## 2024-11-01 PROCEDURE — 76642 ULTRASOUND BREAST LIMITED: CPT

## 2024-11-27 ENCOUNTER — HOSPITAL ENCOUNTER (OUTPATIENT)
Facility: HOSPITAL | Age: 39
Discharge: HOME | End: 2024-11-30
Payer: COMMERCIAL

## 2024-11-27 DIAGNOSIS — N63.20 MASS OF LEFT BREAST, UNSPECIFIED QUADRANT: ICD-10-CM

## 2024-11-27 PROCEDURE — 88305 TISSUE EXAM BY PATHOLOGIST: CPT

## 2024-11-27 PROCEDURE — 19083 BX BREAST 1ST LESION US IMAG: CPT

## 2024-11-27 PROCEDURE — 6360000002 HC RX W HCPCS: Performed by: RADIOLOGY

## 2024-11-27 RX ORDER — LIDOCAINE HYDROCHLORIDE AND EPINEPHRINE 10; 10 MG/ML; UG/ML
10 INJECTION, SOLUTION INFILTRATION; PERINEURAL ONCE
Status: COMPLETED | OUTPATIENT
Start: 2024-11-27 | End: 2024-11-27

## 2024-11-27 RX ORDER — LIDOCAINE HYDROCHLORIDE 10 MG/ML
5 INJECTION, SOLUTION INFILTRATION; PERINEURAL ONCE
Status: COMPLETED | OUTPATIENT
Start: 2024-11-27 | End: 2024-11-27

## 2024-11-27 RX ADMIN — LIDOCAINE HYDROCHLORIDE 5 ML: 10 INJECTION, SOLUTION INFILTRATION; PERINEURAL at 09:20

## 2024-11-27 RX ADMIN — LIDOCAINE HYDROCHLORIDE,EPINEPHRINE BITARTRATE 10 ML: 10; .01 INJECTION, SOLUTION INFILTRATION; PERINEURAL at 09:20

## 2024-11-27 NOTE — PROGRESS NOTES
Patient tolerated left breast biopsy well with scant bleeding. Biopsy site was bandaged in the usual fashion and discharge instructions were reviewed with the patient. She was provided with a written copy as well. Advised patient that results should be available in 2-3 business days and that she will receive a phone call. Encouraged her to call with any questions or concerns.

## 2024-11-29 NOTE — PROGRESS NOTES
I spoke with Ms. Mcclellan and gave her biopsy results.  I let her know that the results are benign, but she will need a surgical consult to have area removed.  She has not removed bandage yet.

## 2024-12-18 ENCOUNTER — OFFICE VISIT (OUTPATIENT)
Age: 39
End: 2024-12-18
Payer: COMMERCIAL

## 2024-12-18 VITALS — BODY MASS INDEX: 31.85 KG/M2 | HEIGHT: 59 IN | WEIGHT: 158 LBS

## 2024-12-18 DIAGNOSIS — N63.20 MASS OF LEFT BREAST, UNSPECIFIED QUADRANT: Primary | ICD-10-CM

## 2024-12-18 PROCEDURE — 99203 OFFICE O/P NEW LOW 30 MIN: CPT | Performed by: SURGERY

## 2024-12-18 NOTE — PROGRESS NOTES
HISTORY OF PRESENT ILLNESS  Isa Mcclellan is a 39 y.o. female     HPI  NEW patient consult referred by Dr. Daniel Arguelles for LEFT breast fibro epithelial lesion.  To discuss removal    Patient noticed lump in 2024  Family History:  Paternal grandmother-breast cancer-age unknown-      Mammogram, 26, BIRADS 4  Biopsy-24      Review of Systems      Physical Exam       ASSESSMENT and PLAN  {Assessment and Plan Chronic Disease:[id number]}    
Normocephalic and atraumatic.      Right Ear: External ear normal.      Left Ear: External ear normal.   Eyes:      General: No scleral icterus.        Right eye: No discharge.         Left eye: No discharge.      Pupils: Pupils are equal, round, and reactive to light.   Cardiovascular:      Rate and Rhythm: Normal rate and regular rhythm.   Pulmonary:      Effort: Pulmonary effort is normal. No respiratory distress.      Breath sounds: Normal breath sounds. No stridor.   Chest:   Breasts:     Right: Normal.       Abdominal:      General: There is no distension.      Palpations: Abdomen is soft. There is no mass.      Tenderness: There is no abdominal tenderness.   Musculoskeletal:         General: Normal range of motion.      Cervical back: Normal range of motion and neck supple.   Lymphadenopathy:      Cervical: No cervical adenopathy.   Skin:     General: Skin is warm.   Neurological:      Mental Status: She is alert and oriented to person, place, and time.   Psychiatric:         Behavior: Behavior normal.         Thought Content: Thought content normal.         Judgment: Judgment normal.       ASSESSMENT and PLAN   Diagnosis Orders   1. Mass of left breast, unspecified quadrant          New patient presented for evaluation of LEFT breast fibro epithelial lesion and was doing well overall. Physical exam of the LEFT breast noted a palpable mass at 12:00.  We will schedule an excisional biopsy. She is considering getting this done along with a cosmetic breast procedure with Dr Burns (plastics) which is fine with us; otherwise coordinate with plastics.    This plan was reviewed with the patient and patient agrees. All questions were answered.        Total time spent excluding procedural time was 40  minutes.    Nini BLAIR, am scribing for and in the presence of Daniel Ferrara Jr, MD. 12/18/24/10:35 AM EST    Dr. Josias BLAIR, personally performed the services described in this document as scribed by Nini

## 2025-02-08 DIAGNOSIS — E66.9 NON MORBID OBESITY: ICD-10-CM

## 2025-02-11 RX ORDER — PHENTERMINE HYDROCHLORIDE 37.5 MG/1
37.5 TABLET ORAL DAILY
Qty: 30 TABLET | Refills: 5 | Status: SHIPPED | OUTPATIENT
Start: 2025-02-11 | End: 2025-08-11

## 2025-03-05 PROBLEM — G43.909 MIGRAINE: Status: ACTIVE | Noted: 2022-09-22

## 2025-03-07 ENCOUNTER — LAB (OUTPATIENT)
Age: 40
End: 2025-03-07

## 2025-03-07 DIAGNOSIS — E66.9 NON MORBID OBESITY: ICD-10-CM

## 2025-03-07 DIAGNOSIS — C73 PAPILLARY THYROID CARCINOMA (HCC): ICD-10-CM

## 2025-03-07 DIAGNOSIS — E03.9 PRIMARY HYPOTHYROIDISM: ICD-10-CM

## 2025-03-07 LAB
T4 FREE SERPL-MCNC: 1.3 NG/DL (ref 0.8–1.5)
TSH SERPL DL<=0.05 MIU/L-ACNC: 0.62 UIU/ML (ref 0.36–3.74)

## 2025-03-14 ENCOUNTER — OFFICE VISIT (OUTPATIENT)
Age: 40
End: 2025-03-14

## 2025-03-14 VITALS
BODY MASS INDEX: 31.85 KG/M2 | OXYGEN SATURATION: 99 % | WEIGHT: 158 LBS | DIASTOLIC BLOOD PRESSURE: 65 MMHG | RESPIRATION RATE: 17 BRPM | HEART RATE: 89 BPM | SYSTOLIC BLOOD PRESSURE: 114 MMHG | HEIGHT: 59 IN | TEMPERATURE: 98.7 F

## 2025-03-14 DIAGNOSIS — E66.9 NON MORBID OBESITY: ICD-10-CM

## 2025-03-14 DIAGNOSIS — E03.9 PRIMARY HYPOTHYROIDISM: ICD-10-CM

## 2025-03-14 DIAGNOSIS — E89.0 POSTPROCEDURAL HYPOTHYROIDISM: ICD-10-CM

## 2025-03-14 DIAGNOSIS — C73 MALIGNANT NEOPLASM OF THYROID GLAND (HCC): ICD-10-CM

## 2025-03-14 DIAGNOSIS — C73 PAPILLARY THYROID CARCINOMA (HCC): Primary | ICD-10-CM

## 2025-03-14 DIAGNOSIS — C73 PRIMARY CANCER OF THYROID WITH METASTASIS TO OTHER SITE (HCC): ICD-10-CM

## 2025-03-14 RX ORDER — LEVOTHYROXINE SODIUM 150 MCG
TABLET ORAL
Qty: 90 TABLET | Refills: 3 | Status: SHIPPED | OUTPATIENT
Start: 2025-03-14

## 2025-03-14 NOTE — PROGRESS NOTES
[facility] DIABETES AND ENDOCRINOLOGY                 Penny Cruz MD            Chief Complaint   Patient presents with    Thyroid Problem             History of present illness      Isa Mcclellan is  here for follow-up of thyroid cancer.    She is on phentermine, weaned off 3 months ago,,exercising   Helps with appetite, she has resumed phentermine, no side effects  No leg pain  Had labs, thyroglobulin pending  Status post radioactive iodine therapy 2024    Breast mass, biopsy benign  No change in the size of the neck  Had thyroid  stimulated whole-body scan  Original weight 189 pounds    On Synthroid  No change in the size of the neck     No FHX of PCOS      Prior history   Papillary thyroid carcinoma T3N1 2.1 cm, status post total thyroidectomy at [facility] by Dr. Daugherty, in October 2011.  She had metastatic carcinoma in 6 of 7 lymph nodes level VI nodes with  vascular and lymphatic invasion.  2 parathyroid glands were removed right inferior and right superior.  Thyrogen stimulated ablation February 2012 with 167 mCi with a TG of 3.6.  Neck ultrasound in October was negative, initial TG was 0.8, increased to  1.1.  Thyrogen stimulated TG was 40 with a negative whole-body scan.  She had neck ultrasound at [facility] in December which was negative for recurrent disease.  PET scan showed uptake in one axillary node in the mediastinum.  She saw Dr. Collins at  [facility],       November 2011: TSH 1.3, TG 6.6 (postop, PreI-131)   May 31, 2012, TSH less than 0.01, TG 0.8, TG antibody less than 20   October 16, 2012: TG 1.1, TG antibody less than 20, TSH 0.042   November 30, 2012: Thyrogen stimulated TG 40, TG antibody less than 20   January 18, 2013: TG 1.5, TG antibody less than 20, TSH 0.019 (on Synthroid 125 mcg)   April 2013: TG 1.5, TSH 0.45   February 2015: TG 2.2, TSH 0.3   September 2015: TSH 0.08, TG 4.6   2016: TG 6.8, TSH 0.495      Thyrogen whole-body scan: January 31,

## 2025-03-14 NOTE — PROGRESS NOTES
Isa Mcclellan is a 39 y.o. female here for   Chief Complaint   Patient presents with    Thyroid Problem       All medication reviewed.     Vitals:    03/14/25 0910   BP: 114/65   BP Site: Right Upper Arm   Patient Position: Sitting   BP Cuff Size: Medium Adult   Pulse: 89   Resp: 17   Temp: 98.7 °F (37.1 °C)   TempSrc: Temporal   SpO2: 99%   Weight: 71.7 kg (158 lb)   Height: 1.499 m (4' 11\")        1. Have you been to the ER, urgent care clinic since your last visit?  Hospitalized since your last visit? -no    2. Have you seen or consulted any other health care providers outside of the [facility] since your last visit?  Include any pap smears or colon screening.-no

## 2025-03-17 ENCOUNTER — RESULTS FOLLOW-UP (OUTPATIENT)
Age: 40
End: 2025-03-17

## 2025-03-17 LAB
THYROGLOBULIN (ICMA): 5.3 NG/ML
THYROGLOBULIN (TG-RIA): NORMAL NG/ML
THYROGLOBULIN AB: <1 IU/ML

## 2025-05-01 NOTE — PERIOP NOTE
[facility]                   [address] [place] [address]   MAIN OR                               [phone]   MAIN PRE OP                       [phone]                                                                                AMBULATORY PRE OP       [phone]  PRE-ADMISSION TESTING [phone]     Surgery Date:  Monday, May 5, 2025          INSTRUCTIONS BEFORE YOUR SURGERY   Arrival [facility] staff will call you between 3 and 7pm the day before your surgery with your arrival time.   (If your surgery is on a Monday, we will call you the Friday before.)    Call [phone] after 7pm Monday-Friday if you did not receive this call.    Free  parking is available from 7:00 AM - 5:00 PM.    Come through the Main Entrance of the hospital.  Take the elevators on the left side of the lobby to the 2nd floor. Proceed to the Admitting Desk to you right for check in.   Have your insurance card, photo ID, and any copayment (if needed).   Food   and   Drink No food or drink (gum, mints, coffee, juice, etc) after midnight the night before surgery.   No alcohol (beer, wine, liquor) or marijuana 24 hours before and after surgery.    You may drink WATER ONLY up until 2 hours prior to your surgery time.   Please do not add anything to your water as this may result in your surgery being postponed.      Pre- operative  Medication Instructions   MEDICATIONS TO TAKE THE MORNING OF SURGERY WITH A SIP OF WATER:     Synthroid         SPECIAL INSTRUCTIONS:    Tylenol may be taken as needed.   Medications  To  STOP      7 days before surgery Non-Steroidal anti-inflammatory Drugs (NSAID's): for example, Ibuprofen (Advil, Motrin), Naproxen (Aleve).  GLP-1 medications taken for weight loss/ diabetes.   Herbal supplements, vitamins, and fish oil.  Other:     Blood  Thinners If you take  Aspirin or any blood-thinning or anti-blood clot medicine, talk to the doctor who prescribed the

## 2025-05-02 ENCOUNTER — ANESTHESIA EVENT (OUTPATIENT)
Facility: HOSPITAL | Age: 40
End: 2025-05-02
Payer: COMMERCIAL

## 2025-05-02 NOTE — PERIOP NOTE
Signed         Hello,      You are scheduled to have surgery Monday at [facility].      We would like for you to arrive at  0530 am  We are located on the second floor, [address]. You will check-in at the registration desk located outside the elevators on the second floor prior to proceeding to [address].  Remember nothing to eat or drink after midnight. If you need to take medications the morning of surgery, please take with a few sips of water.   Wear loose, comfortable clothing and leave all your jewelry at home.   You may bring your cell phone with you.  One family member will be allowed in the pre-op area once you are dressed and your IV has been started.   You will need someone to drive you home and be with you for 24 hours post-anesthesia.      We look forward to seeing you! Call [phone] for questions after hours and [phone] between 5:30AM and 6PM.      Thanks!     [facility]  PREOP TEAM

## 2025-05-05 ENCOUNTER — ANESTHESIA (OUTPATIENT)
Facility: HOSPITAL | Age: 40
End: 2025-05-05
Payer: COMMERCIAL

## 2025-05-05 ENCOUNTER — HOSPITAL ENCOUNTER (OUTPATIENT)
Facility: HOSPITAL | Age: 40
Discharge: HOME | End: 2025-05-06
Attending: PLASTIC SURGERY | Admitting: PLASTIC SURGERY
Payer: COMMERCIAL

## 2025-05-05 PROBLEM — D64.9 ANEMIA: Status: ACTIVE | Noted: 2025-05-05

## 2025-05-05 PROBLEM — D62 ACUTE BLOOD LOSS ANEMIA: Status: ACTIVE | Noted: 2025-05-05

## 2025-05-05 LAB
HCG UR QL: NEGATIVE
HCT VFR BLD AUTO: 26.4 % (ref 35–47)
HGB BLD-MCNC: 8.6 G/DL (ref 11.5–16)

## 2025-05-05 PROCEDURE — 2580000003 HC RX 258: Performed by: PLASTIC SURGERY

## 2025-05-05 PROCEDURE — 2709999900 HC NON-CHARGEABLE SUPPLY: Performed by: PLASTIC SURGERY

## 2025-05-05 PROCEDURE — 6370000000 HC RX 637 (ALT 250 FOR IP): Performed by: PLASTIC SURGERY

## 2025-05-05 PROCEDURE — 88305 TISSUE EXAM BY PATHOLOGIST: CPT

## 2025-05-05 PROCEDURE — 94761 N-INVAS EAR/PLS OXIMETRY MLT: CPT

## 2025-05-05 PROCEDURE — 85014 HEMATOCRIT: CPT

## 2025-05-05 PROCEDURE — 36415 COLL VENOUS BLD VENIPUNCTURE: CPT

## 2025-05-05 PROCEDURE — 3700000000 HC ANESTHESIA ATTENDED CARE: Performed by: PLASTIC SURGERY

## 2025-05-05 PROCEDURE — 2580000003 HC RX 258: Performed by: NURSE ANESTHETIST, CERTIFIED REGISTERED

## 2025-05-05 PROCEDURE — 85018 HEMOGLOBIN: CPT

## 2025-05-05 PROCEDURE — 6360000002 HC RX W HCPCS: Performed by: PLASTIC SURGERY

## 2025-05-05 PROCEDURE — 7100000001 HC PACU RECOVERY - ADDTL 15 MIN: Performed by: PLASTIC SURGERY

## 2025-05-05 PROCEDURE — 81025 URINE PREGNANCY TEST: CPT

## 2025-05-05 PROCEDURE — 2500000003 HC RX 250 WO HCPCS: Performed by: PLASTIC SURGERY

## 2025-05-05 PROCEDURE — 2720000010 HC SURG SUPPLY STERILE: Performed by: PLASTIC SURGERY

## 2025-05-05 PROCEDURE — 7100000000 HC PACU RECOVERY - FIRST 15 MIN: Performed by: PLASTIC SURGERY

## 2025-05-05 PROCEDURE — 3600000013 HC SURGERY LEVEL 3 ADDTL 15MIN: Performed by: PLASTIC SURGERY

## 2025-05-05 PROCEDURE — 2500000003 HC RX 250 WO HCPCS: Performed by: NURSE ANESTHETIST, CERTIFIED REGISTERED

## 2025-05-05 PROCEDURE — 3600000003 HC SURGERY LEVEL 3 BASE: Performed by: PLASTIC SURGERY

## 2025-05-05 PROCEDURE — 6360000002 HC RX W HCPCS: Performed by: NURSE ANESTHETIST, CERTIFIED REGISTERED

## 2025-05-05 PROCEDURE — 3700000001 HC ADD 15 MINUTES (ANESTHESIA): Performed by: PLASTIC SURGERY

## 2025-05-05 RX ORDER — MAGNESIUM SULFATE IN WATER 40 MG/ML
INJECTION, SOLUTION INTRAVENOUS
Status: DISCONTINUED | OUTPATIENT
Start: 2025-05-05 | End: 2025-05-05 | Stop reason: SDUPTHER

## 2025-05-05 RX ORDER — SODIUM CHLORIDE 0.9 % (FLUSH) 0.9 %
5-40 SYRINGE (ML) INJECTION PRN
Status: DISCONTINUED | OUTPATIENT
Start: 2025-05-05 | End: 2025-05-05 | Stop reason: HOSPADM

## 2025-05-05 RX ORDER — SODIUM CHLORIDE 9 MG/ML
INJECTION, SOLUTION INTRAVENOUS PRN
Status: DISCONTINUED | OUTPATIENT
Start: 2025-05-05 | End: 2025-05-05 | Stop reason: HOSPADM

## 2025-05-05 RX ORDER — OXYCODONE HYDROCHLORIDE 5 MG/1
10 TABLET ORAL EVERY 4 HOURS PRN
Refills: 0 | Status: DISCONTINUED | OUTPATIENT
Start: 2025-05-05 | End: 2025-05-06 | Stop reason: HOSPADM

## 2025-05-05 RX ORDER — SENNOSIDES A AND B 8.6 MG/1
1 TABLET, FILM COATED ORAL DAILY PRN
Status: DISCONTINUED | OUTPATIENT
Start: 2025-05-05 | End: 2025-05-06 | Stop reason: HOSPADM

## 2025-05-05 RX ORDER — GABAPENTIN 300 MG/1
300 CAPSULE ORAL ONCE
Status: COMPLETED | OUTPATIENT
Start: 2025-05-05 | End: 2025-05-05

## 2025-05-05 RX ORDER — PHENYLEPHRINE HCL IN 0.9% NACL 0.4MG/10ML
SYRINGE (ML) INTRAVENOUS
Status: DISCONTINUED | OUTPATIENT
Start: 2025-05-05 | End: 2025-05-05 | Stop reason: SDUPTHER

## 2025-05-05 RX ORDER — SUFENTANIL CITRATE 50 UG/ML
INJECTION EPIDURAL; INTRAVENOUS
Status: DISCONTINUED | OUTPATIENT
Start: 2025-05-05 | End: 2025-05-05 | Stop reason: SDUPTHER

## 2025-05-05 RX ORDER — LIDOCAINE HYDROCHLORIDE 20 MG/ML
INJECTION, SOLUTION EPIDURAL; INFILTRATION; INTRACAUDAL; PERINEURAL
Status: DISCONTINUED | OUTPATIENT
Start: 2025-05-05 | End: 2025-05-05 | Stop reason: SDUPTHER

## 2025-05-05 RX ORDER — PROCHLORPERAZINE EDISYLATE 5 MG/ML
5 INJECTION INTRAMUSCULAR; INTRAVENOUS
Status: DISCONTINUED | OUTPATIENT
Start: 2025-05-05 | End: 2025-05-05 | Stop reason: HOSPADM

## 2025-05-05 RX ORDER — SODIUM CHLORIDE 0.9 % (FLUSH) 0.9 %
5-40 SYRINGE (ML) INJECTION EVERY 12 HOURS SCHEDULED
Status: DISCONTINUED | OUTPATIENT
Start: 2025-05-05 | End: 2025-05-05 | Stop reason: HOSPADM

## 2025-05-05 RX ORDER — SODIUM CHLORIDE 0.9 % (FLUSH) 0.9 %
5-40 SYRINGE (ML) INJECTION PRN
Status: DISCONTINUED | OUTPATIENT
Start: 2025-05-05 | End: 2025-05-06 | Stop reason: HOSPADM

## 2025-05-05 RX ORDER — METOPROLOL TARTRATE 1 MG/ML
INJECTION, SOLUTION INTRAVENOUS
Status: DISCONTINUED | OUTPATIENT
Start: 2025-05-05 | End: 2025-05-05 | Stop reason: SDUPTHER

## 2025-05-05 RX ORDER — LORAZEPAM 2 MG/ML
0.5 INJECTION INTRAMUSCULAR
Status: DISCONTINUED | OUTPATIENT
Start: 2025-05-05 | End: 2025-05-05 | Stop reason: HOSPADM

## 2025-05-05 RX ORDER — MIDAZOLAM HYDROCHLORIDE 1 MG/ML
INJECTION, SOLUTION INTRAMUSCULAR; INTRAVENOUS
Status: DISCONTINUED | OUTPATIENT
Start: 2025-05-05 | End: 2025-05-05 | Stop reason: SDUPTHER

## 2025-05-05 RX ORDER — FENTANYL CITRATE 50 UG/ML
50 INJECTION, SOLUTION INTRAMUSCULAR; INTRAVENOUS EVERY 5 MIN PRN
Status: DISCONTINUED | OUTPATIENT
Start: 2025-05-05 | End: 2025-05-05 | Stop reason: HOSPADM

## 2025-05-05 RX ORDER — DIPHENHYDRAMINE HYDROCHLORIDE 50 MG/ML
12.5 INJECTION, SOLUTION INTRAMUSCULAR; INTRAVENOUS
Status: DISCONTINUED | OUTPATIENT
Start: 2025-05-05 | End: 2025-05-05 | Stop reason: HOSPADM

## 2025-05-05 RX ORDER — LIDOCAINE HYDROCHLORIDE 10 MG/ML
1 INJECTION, SOLUTION EPIDURAL; INFILTRATION; INTRACAUDAL; PERINEURAL
Status: DISCONTINUED | OUTPATIENT
Start: 2025-05-05 | End: 2025-05-05 | Stop reason: HOSPADM

## 2025-05-05 RX ORDER — SODIUM CHLORIDE 9 MG/ML
INJECTION, SOLUTION INTRAVENOUS PRN
Status: DISCONTINUED | OUTPATIENT
Start: 2025-05-05 | End: 2025-05-06 | Stop reason: HOSPADM

## 2025-05-05 RX ORDER — DROPERIDOL 2.5 MG/ML
0.62 INJECTION, SOLUTION INTRAMUSCULAR; INTRAVENOUS
Status: DISCONTINUED | OUTPATIENT
Start: 2025-05-05 | End: 2025-05-05 | Stop reason: HOSPADM

## 2025-05-05 RX ORDER — ACETAMINOPHEN 325 MG/1
650 TABLET ORAL EVERY 6 HOURS PRN
Status: DISCONTINUED | OUTPATIENT
Start: 2025-05-05 | End: 2025-05-06 | Stop reason: HOSPADM

## 2025-05-05 RX ORDER — ROCURONIUM BROMIDE 10 MG/ML
INJECTION, SOLUTION INTRAVENOUS
Status: DISCONTINUED | OUTPATIENT
Start: 2025-05-05 | End: 2025-05-05 | Stop reason: SDUPTHER

## 2025-05-05 RX ORDER — ACETAMINOPHEN 500 MG
1000 TABLET ORAL ONCE
Status: COMPLETED | OUTPATIENT
Start: 2025-05-05 | End: 2025-05-05

## 2025-05-05 RX ORDER — NALOXONE HYDROCHLORIDE 0.4 MG/ML
INJECTION, SOLUTION INTRAMUSCULAR; INTRAVENOUS; SUBCUTANEOUS PRN
Status: DISCONTINUED | OUTPATIENT
Start: 2025-05-05 | End: 2025-05-05 | Stop reason: HOSPADM

## 2025-05-05 RX ORDER — OXYCODONE AND ACETAMINOPHEN 5; 325 MG/1; MG/1
1 TABLET ORAL EVERY 4 HOURS PRN
Refills: 0 | Status: DISCONTINUED | OUTPATIENT
Start: 2025-05-05 | End: 2025-05-06 | Stop reason: HOSPADM

## 2025-05-05 RX ORDER — BACITRACIN ZINC 500 [USP'U]/G
OINTMENT TOPICAL PRN
Status: DISCONTINUED | OUTPATIENT
Start: 2025-05-05 | End: 2025-05-05 | Stop reason: HOSPADM

## 2025-05-05 RX ORDER — SODIUM CHLORIDE, SODIUM LACTATE, POTASSIUM CHLORIDE, CALCIUM CHLORIDE 600; 310; 30; 20 MG/100ML; MG/100ML; MG/100ML; MG/100ML
INJECTION, SOLUTION INTRAVENOUS
Status: DISCONTINUED | OUTPATIENT
Start: 2025-05-05 | End: 2025-05-05 | Stop reason: SDUPTHER

## 2025-05-05 RX ORDER — SODIUM CHLORIDE 0.9 % (FLUSH) 0.9 %
5-40 SYRINGE (ML) INJECTION EVERY 12 HOURS SCHEDULED
Status: DISCONTINUED | OUTPATIENT
Start: 2025-05-05 | End: 2025-05-06 | Stop reason: HOSPADM

## 2025-05-05 RX ORDER — ONDANSETRON 2 MG/ML
4 INJECTION INTRAMUSCULAR; INTRAVENOUS EVERY 6 HOURS PRN
Status: DISCONTINUED | OUTPATIENT
Start: 2025-05-05 | End: 2025-05-06 | Stop reason: HOSPADM

## 2025-05-05 RX ORDER — DEXAMETHASONE SODIUM PHOSPHATE 4 MG/ML
INJECTION, SOLUTION INTRA-ARTICULAR; INTRALESIONAL; INTRAMUSCULAR; INTRAVENOUS; SOFT TISSUE
Status: DISCONTINUED | OUTPATIENT
Start: 2025-05-05 | End: 2025-05-05 | Stop reason: SDUPTHER

## 2025-05-05 RX ORDER — ACETAMINOPHEN 650 MG/1
650 SUPPOSITORY RECTAL EVERY 6 HOURS PRN
Status: DISCONTINUED | OUTPATIENT
Start: 2025-05-05 | End: 2025-05-06 | Stop reason: HOSPADM

## 2025-05-05 RX ORDER — LIDOCAINE HYDROCHLORIDE AND EPINEPHRINE 10; 10 MG/ML; UG/ML
INJECTION, SOLUTION INFILTRATION; PERINEURAL PRN
Status: DISCONTINUED | OUTPATIENT
Start: 2025-05-05 | End: 2025-05-05 | Stop reason: HOSPADM

## 2025-05-05 RX ORDER — SCOPOLAMINE 1 MG/3D
1 PATCH, EXTENDED RELEASE TRANSDERMAL
Status: DISCONTINUED | OUTPATIENT
Start: 2025-05-05 | End: 2025-05-06 | Stop reason: HOSPADM

## 2025-05-05 RX ORDER — SODIUM CHLORIDE 450 MG/100ML
INJECTION, SOLUTION INTRAVENOUS CONTINUOUS
Status: DISCONTINUED | OUTPATIENT
Start: 2025-05-05 | End: 2025-05-06 | Stop reason: HOSPADM

## 2025-05-05 RX ORDER — ONDANSETRON 2 MG/ML
INJECTION INTRAMUSCULAR; INTRAVENOUS
Status: DISCONTINUED | OUTPATIENT
Start: 2025-05-05 | End: 2025-05-05 | Stop reason: SDUPTHER

## 2025-05-05 RX ORDER — ONDANSETRON 4 MG/1
4 TABLET, ORALLY DISINTEGRATING ORAL EVERY 8 HOURS PRN
Status: DISCONTINUED | OUTPATIENT
Start: 2025-05-05 | End: 2025-05-06 | Stop reason: HOSPADM

## 2025-05-05 RX ORDER — PROPOFOL 10 MG/ML
INJECTION, EMULSION INTRAVENOUS
Status: DISCONTINUED | OUTPATIENT
Start: 2025-05-05 | End: 2025-05-05 | Stop reason: SDUPTHER

## 2025-05-05 RX ORDER — LABETALOL HYDROCHLORIDE 5 MG/ML
10 INJECTION, SOLUTION INTRAVENOUS
Status: DISCONTINUED | OUTPATIENT
Start: 2025-05-05 | End: 2025-05-05 | Stop reason: HOSPADM

## 2025-05-05 RX ADMIN — MAGNESIUM SULFATE HEPTAHYDRATE 2000 MG: 40 INJECTION, SOLUTION INTRAVENOUS at 08:17

## 2025-05-05 RX ADMIN — SUFENTANIL CITRATE 5 MCG: 50 INJECTION, SOLUTION EPIDURAL; INTRAVENOUS at 13:45

## 2025-05-05 RX ADMIN — Medication 25 MG: at 08:17

## 2025-05-05 RX ADMIN — METOPROLOL TARTRATE 2.5 MG: 5 INJECTION INTRAVENOUS at 08:28

## 2025-05-05 RX ADMIN — DEXAMETHASONE SODIUM PHOSPHATE 8 MG: 4 INJECTION INTRA-ARTICULAR; INTRALESIONAL; INTRAMUSCULAR; INTRAVENOUS; SOFT TISSUE at 07:55

## 2025-05-05 RX ADMIN — ROCURONIUM BROMIDE 20 MG: 10 INJECTION INTRAVENOUS at 09:56

## 2025-05-05 RX ADMIN — Medication 25 MG: at 08:04

## 2025-05-05 RX ADMIN — SUFENTANIL CITRATE 15 MCG: 50 INJECTION, SOLUTION EPIDURAL; INTRAVENOUS at 07:35

## 2025-05-05 RX ADMIN — SUFENTANIL CITRATE 15 MCG: 50 INJECTION, SOLUTION EPIDURAL; INTRAVENOUS at 12:53

## 2025-05-05 RX ADMIN — OXYCODONE AND ACETAMINOPHEN 1 TABLET: 325; 5 TABLET ORAL at 18:21

## 2025-05-05 RX ADMIN — WATER 1000 MG: 1 INJECTION INTRAMUSCULAR; INTRAVENOUS; SUBCUTANEOUS at 20:26

## 2025-05-05 RX ADMIN — Medication 100 MCG: at 12:24

## 2025-05-05 RX ADMIN — ACETAMINOPHEN 1000 MG: 500 TABLET ORAL at 06:29

## 2025-05-05 RX ADMIN — GABAPENTIN 300 MG: 300 CAPSULE ORAL at 06:29

## 2025-05-05 RX ADMIN — WATER 2000 MG: 1 INJECTION INTRAMUSCULAR; INTRAVENOUS; SUBCUTANEOUS at 11:52

## 2025-05-05 RX ADMIN — ROCURONIUM BROMIDE 50 MG: 10 INJECTION INTRAVENOUS at 07:37

## 2025-05-05 RX ADMIN — SODIUM CHLORIDE: 0.45 INJECTION, SOLUTION INTRAVENOUS at 18:21

## 2025-05-05 RX ADMIN — SUFENTANIL CITRATE 15 MCG: 50 INJECTION, SOLUTION EPIDURAL; INTRAVENOUS at 09:23

## 2025-05-05 RX ADMIN — SODIUM CHLORIDE, POTASSIUM CHLORIDE, SODIUM LACTATE AND CALCIUM CHLORIDE: 600; 310; 30; 20 INJECTION, SOLUTION INTRAVENOUS at 09:26

## 2025-05-05 RX ADMIN — SUFENTANIL CITRATE 15 MCG: 50 INJECTION, SOLUTION EPIDURAL; INTRAVENOUS at 10:53

## 2025-05-05 RX ADMIN — WATER 2000 MG: 1 INJECTION INTRAMUSCULAR; INTRAVENOUS; SUBCUTANEOUS at 07:59

## 2025-05-05 RX ADMIN — PROPOFOL 150 MCG/KG/MIN: 10 INJECTION, EMULSION INTRAVENOUS at 07:39

## 2025-05-05 RX ADMIN — Medication 80 MCG: at 13:58

## 2025-05-05 RX ADMIN — ROCURONIUM BROMIDE 30 MG: 10 INJECTION INTRAVENOUS at 12:04

## 2025-05-05 RX ADMIN — ONDANSETRON 4 MG: 2 INJECTION, SOLUTION INTRAMUSCULAR; INTRAVENOUS at 12:56

## 2025-05-05 RX ADMIN — OXYCODONE AND ACETAMINOPHEN 1 TABLET: 325; 5 TABLET ORAL at 23:40

## 2025-05-05 RX ADMIN — SUGAMMADEX 200 MG: 100 INJECTION, SOLUTION INTRAVENOUS at 13:39

## 2025-05-05 RX ADMIN — SODIUM CHLORIDE, POTASSIUM CHLORIDE, SODIUM LACTATE AND CALCIUM CHLORIDE: 600; 310; 30; 20 INJECTION, SOLUTION INTRAVENOUS at 07:29

## 2025-05-05 RX ADMIN — ROCURONIUM BROMIDE 30 MG: 10 INJECTION INTRAVENOUS at 08:23

## 2025-05-05 RX ADMIN — PROPOFOL 150 MG: 10 INJECTION, EMULSION INTRAVENOUS at 07:37

## 2025-05-05 RX ADMIN — ROCURONIUM BROMIDE 40 MG: 10 INJECTION INTRAVENOUS at 10:55

## 2025-05-05 RX ADMIN — MIDAZOLAM HYDROCHLORIDE 3 MG: 1 INJECTION, SOLUTION INTRAMUSCULAR; INTRAVENOUS at 07:29

## 2025-05-05 RX ADMIN — LIDOCAINE HYDROCHLORIDE 80 MG: 20 INJECTION, SOLUTION EPIDURAL; INFILTRATION; INTRACAUDAL; PERINEURAL at 07:35

## 2025-05-05 RX ADMIN — SODIUM CHLORIDE, POTASSIUM CHLORIDE, SODIUM LACTATE AND CALCIUM CHLORIDE: 600; 310; 30; 20 INJECTION, SOLUTION INTRAVENOUS at 11:31

## 2025-05-05 RX ADMIN — MIDAZOLAM HYDROCHLORIDE 2 MG: 1 INJECTION, SOLUTION INTRAMUSCULAR; INTRAVENOUS at 07:32

## 2025-05-05 RX ADMIN — PROPOFOL 50 MG: 10 INJECTION, EMULSION INTRAVENOUS at 08:19

## 2025-05-05 RX ADMIN — ONDANSETRON 4 MG: 2 INJECTION, SOLUTION INTRAMUSCULAR; INTRAVENOUS at 07:56

## 2025-05-05 ASSESSMENT — PAIN SCALES - GENERAL
PAINLEVEL_OUTOF10: 4
PAINLEVEL_OUTOF10: 4
PAINLEVEL_OUTOF10: 1

## 2025-05-05 ASSESSMENT — PAIN - FUNCTIONAL ASSESSMENT
PAIN_FUNCTIONAL_ASSESSMENT: ACTIVITIES ARE NOT PREVENTED
PAIN_FUNCTIONAL_ASSESSMENT: 0-10

## 2025-05-05 ASSESSMENT — PAIN DESCRIPTION - DESCRIPTORS: DESCRIPTORS: ACHING

## 2025-05-05 ASSESSMENT — PAIN DESCRIPTION - LOCATION: LOCATION: ABDOMEN

## 2025-05-05 ASSESSMENT — PAIN DESCRIPTION - ORIENTATION: ORIENTATION: ANTERIOR

## 2025-05-05 NOTE — PROGRESS NOTES
Pt up to bathroom with assist and wheelchair. She then became nauseas and passed out. Pt was attended to and returned to stretcher and PACU.. Pt BP was 100/68 on return and she became alert and oriented. Dr. Burns put orders in to keep her overnight for observation.

## 2025-05-05 NOTE — ANESTHESIA POSTPROCEDURE EVALUATION
Department of Anesthesiology  Postprocedure Note    Patient: Isa Mcclellan  MRN: [id number]  YOB: 1985  Date of evaluation: 5/5/2025    Procedure Summary       Date: 05/05/25 Room / Location: [facility] MAIN OR  / [facility] MAIN OR    Anesthesia Start: 0729 Anesthesia Stop: 1421    Procedures:       ABDOMINOPLASTY WITH LIPOSUCTION TO FLANKS AND BILATERAL MASTOPEXY (GENERAL WITH EXPAREL) (Breast)      . (Bilateral: Abdomen) Diagnosis:       Ptosis of breast      Excessive and redundant skin and subcutaneous tissue      (Ptosis of breast [N64.81])      (Excessive and redundant skin and subcutaneous tissue [L98.7])    Surgeons: Chad Burns MD Responsible Provider: Daniel Smith DO    Anesthesia Type: TIVA, General ASA Status: 2            Anesthesia Type: TIVA, General    Jolly Phase I: Jolly Score: 10    Jolly Phase II:      Anesthesia Post Evaluation    Patient location during evaluation: PACU  Patient participation: complete - patient participated  Level of consciousness: sleepy but conscious, responsive to verbal stimuli and awake  Airway patency: patent  Nausea & Vomiting: no vomiting and no nausea  Cardiovascular status: hemodynamically stable  Respiratory status: acceptable  Hydration status: stable  Comments: Patient seen and examined.  Patient experienced syncopal episode when taken to bathroom.   Stable now. Ready for discharge from PACU to floor.    Pain management: adequate    No notable events documented.

## 2025-05-05 NOTE — H&P
History and Physical    Patient is a 40 y.o. well-developed, well nourished female who presents for mastopexy, abdominoplasty and liposuction to flanks    Past Medical History:   Diagnosis Date    Abnormal Pap smear of cervix 10/05/2017; 10/30/18    Negative, HR HPV+, 16+, 18 negative; TYLER/Neg HPV    Anxiety     Arthritis     Awareness under anesthesia     Bursitis     and scapulothoracic dyskinesia, right shoulder    Fatigue     Headache     History of colposcopy 12/21/2018    Benign    History of endometrial biopsy 12/21/2018    Benign    Joint pain     Kidney stones     Muscle pain     Pap smear for cervical cancer screening 05/14/2014; 11/4/2019    negative; negative, HPV negative    Psychiatric problem     Anxiety    Shoulder bursitis     right    Thyroid cancer (HCC) 1/10/2013    Complete Thyroidectomy 2012.    Vitamin D deficiency 2021     Past Surgical History:   Procedure Laterality Date    COLPOSCOPY  11/2017    LITHOTRIPSY      ORTHOPEDIC SURGERY Left 2007    metal plate/ 7screwes in Left wrist; Broken ulna and radius.    THYROIDECTOMY  10/2010    total     BREAST BIOPSY W LOC DEVICE 1ST LESION LEFT Left 11/27/2024    US BREAST BIOPSY W LOC DEVICE 1ST LESION LEFT 11/27/2024 [facility] RAD MAMMO     Prior to Admission medications    Medication Sig Start Date End Date Taking? Authorizing Provider   SYNTHROID 150 MCG tablet TAKE 1 TABLET BY MOUTH MON-Fri, 1/2 TAB ON Sat and none on Sundays DORI 3/14/25  Yes Penny Cruz MD   phentermine (ADIPEX-P) 37.5 MG tablet Take 1 tablet by mouth daily for 181 days. Max Daily Amount: 37.5 mg 2/11/25 8/11/25 Yes Penny Cruz MD   Cholecalciferol (VITAMIN D3) 250 MCG (58056 UT) TABS Take 1 tablet by mouth nightly   Yes Provider, MD Renata   amitriptyline (ELAVIL) 50 MG tablet Take 1 tablet by mouth nightly 2/16/24  Yes Renata Rajan MD   ibuprofen (ADVIL;MOTRIN) 600 MG tablet Take 1 tablet by mouth every 6 hours as needed 7/8/21  Yes Automatic

## 2025-05-05 NOTE — ANESTHESIA PRE PROCEDURE
Department of Anesthesiology  Preprocedure Note       Name:  Isa Mcclellan   Age:  40 y.o.  :  1985                                          MRN:  [id number]         Date:  2025      Surgeon: Surgeon(s):  Chad Burns MD    Procedure: Procedure(s):  ABDOMINOPLASTY WITH LIPOSUCTION TO FLANKS AND BILATERAL MASTOPEXY (GENERAL WITH EXPAREL)  .    Medications prior to admission:   Prior to Admission medications    Medication Sig Start Date End Date Taking? Authorizing Provider   SYNTHROID 150 MCG tablet TAKE 1 TABLET BY MOUTH MON-Fri, 1/2 TAB ON Sat and none on Sundays DORI 3/14/25  Yes Penny Cruz MD   phentermine (ADIPEX-P) 37.5 MG tablet Take 1 tablet by mouth daily for 181 days. Max Daily Amount: 37.5 mg 25 Yes Penny Cruz MD   Cholecalciferol (VITAMIN D3) 250 MCG (66424 UT) TABS Take 1 tablet by mouth nightly   Yes ProviderRenata MD   amitriptyline (ELAVIL) 50 MG tablet Take 1 tablet by mouth nightly 24  Yes Renata Rajan MD   ibuprofen (ADVIL;MOTRIN) 600 MG tablet Take 1 tablet by mouth every 6 hours as needed 21  Yes Automatic Reconciliation, Ar   Clindamycin-Benzoyl Per, Refr, 1.2-5 % GEL APPLY THIN LAYER TOPICALLY ONCE DAILY FOR 30 DAYS 24   ProviderRenata MD   butalbital-APAP-caffeine -40 MG CAPS per capsule Take 1 capsule by mouth every 4 hours as needed 24   Renata Rajan MD       Current medications:    Current Facility-Administered Medications   Medication Dose Route Frequency Provider Last Rate Last Admin   • lidocaine PF 1 % injection 1 mL  1 mL IntraDERmal Once PRN Vincenzo Hairston MD       • sodium chloride flush 0.9 % injection 5-40 mL  5-40 mL IntraVENous 2 times per day Vincenzo Hairston MD       • sodium chloride flush 0.9 % injection 5-40 mL  5-40 mL IntraVENous PRN Vincenzo Hairston MD       • 0.9 % sodium chloride infusion   IntraVENous PRN Vincenzo Hairston MD       • ceFAZolin (ANCEF) 2,000 mg in sterile water

## 2025-05-05 NOTE — OP NOTE
Operative Note      Patient: Isa Mcclellan  YOB: 1985  MRN: [id number]    Date of Procedure: 5/5/2025    Pre-Op Diagnosis Codes:      * Ptosis of breast [N64.81]     * Excessive and redundant skin and subcutaneous tissue [L98.7]    Post-Op Diagnosis: Same       Procedure(s):  ABDOMINOPLASTY WITH LIPOSUCTION TO FLANKS AND BILATERAL MASTOPEXY (GENERAL WITH EXPAREL)  .    Surgeon(s):  Chad Burns MD    Assistant:   Surgical Assistant: Stella Sagastume    Anesthesia: General    Estimated Blood Loss (mL): Minimal    Complications: None    Specimens:   ID Type Source Tests Collected by Time Destination   1 : EXCISED TISSUE RIGHT BREAST. Tissue Breast SURGICAL PATHOLOGY Chad Burns MD 5/5/2025 0912    2 : EXCISED TISSUE LEFT BREAST. Tissue Breast SURGICAL PATHOLOGY Chad Burns MD 5/5/2025 0945        Implants:  * No implants in log *      Drains:   Closed/Suction Drain Right Abdomen;LLQ Bulb (Active)   Dressing Status New dressing applied 05/05/25 1311       Closed/Suction Drain Left RLQ Bulb (Active)   Dressing Status New dressing applied 05/05/25 1311       [REMOVED] Urinary Catheter 05/05/25 2 Way;[place] (Removed)       Findings:  Infection Present At Time Of Surgery (PATOS) (choose all levels that have infection present):  No infection present  Other Findings: consistent with procedure    Detailed Description of Procedure:                INDICATION FOR SURGERY:  The patient is a 40 y.o. -year-old female, otherwise healthy, who is unhappy with the droopy appearance of her breasts and excess tissue in her abdomen.  After evaluation and discussion, the patient decided to proceed with abdominoplasty with liposuction to flanks and bilateral mastopexy.   Potential alternatives such as breast lift only were discussed and liposuction only. Not having surgery was also discussed.  Potential risks and complications such as nipple loss, loss of nipple sensation, breast asymmetry, poor

## 2025-05-05 NOTE — DISCHARGE INSTRUCTIONS
daily starting 48 hrs after surgery. Do not get the incisions wet.   ___     No Smoking. The patient must not smoke, and you must not smoke anywhere near the patient for 4 weeks after surgery.  ___     Provide physical and emotional support.   Do not allow your patient to become stressed.  Do not allow your patient to take care of others, or themselves.  Know when your patient needs your attention, and know when he/she needs to rest.  Do not leave him/her unattended during the first 24 hours of recovery.  ___         Follow-up as directed.  Call our office with any questions or concerns at [phone] including on weekends and after hours.   __ Drain care. Please see separate attachment for care of you drains.             POST-SURGERY SYMPTOMS and POSSIBLE COMPLICATIONS: Abdominoplasty   INSTRUCTIONS FOR THE CAREGIVER  Patient Name ______________________________________  Surgery Date _________________________   As the designated caregiver, you must be aware of and know how to address normal post-surgery symptoms.  You must also be alert to possible complications and know the proper actions to take.  ALERTNESS: COMMON SYMPTOMS  Medication may cause mild nausea, lethargy and disorientation. It is important to take plenty of fluids during the post-operative period to overcome the potential side effects of anesthesia and combat the potential side effects of medication.   Swelling: it is common to have swelling of the treated areas for the first 3-5 days.  Bruising: it is common to notice bruising in the treated areas for 1-2 weeks.  Pain: It is common to have pain and aching in the treated areas. Pain medication will help control that.    CONTACT OUR OFFICE IMMEDIATELY if any of the following occur:  Sudden onset of sharp chest pain or chest tightness.  Sudden onset of difficulty breathing.  Sudden onset of pain or aching in legs.  Red, warm, swollen areas in the buttocks, flanks, abdomen.  White purulent drainage from

## 2025-05-05 NOTE — PERIOP NOTE
TRANSFER - OUT REPORT:    Verbal report given to REINALDO Hernandez on Isa Mcclellan  being transferred to 4th floor for routine post-op       Report consisted of patient's Situation, Background, Assessment and   Recommendations(SBAR).     Information from the following report(s) Nurse Handoff Report, Adult Overview, Surgery Report, Intake/Output, and MAR was reviewed with the receiving nurse.           Lines:   Peripheral IV 05/05/25 Posterior;Right Hand (Active)   Site Assessment Clean, dry & intact 05/05/25 1429   Line Status Blood return noted;Flushed;Capped 05/05/25 1429   Line Care Connections checked and tightened 05/05/25 1429   Phlebitis Assessment No symptoms 05/05/25 1429   Infiltration Assessment 0 05/05/25 1429   Alcohol Cap Used Yes 05/05/25 1429   Dressing Status New dressing applied;Clean, dry & intact 05/05/25 1429   Dressing Type Transparent 05/05/25 1429   Dressing Intervention New 05/05/25 1429        Opportunity for questions and clarification was provided.      Patient transported with:  Registered Nurse

## 2025-05-06 VITALS
OXYGEN SATURATION: 100 % | HEIGHT: 59 IN | DIASTOLIC BLOOD PRESSURE: 67 MMHG | TEMPERATURE: 98.2 F | BODY MASS INDEX: 33.51 KG/M2 | HEART RATE: 107 BPM | WEIGHT: 166.23 LBS | RESPIRATION RATE: 16 BRPM | SYSTOLIC BLOOD PRESSURE: 114 MMHG

## 2025-05-06 LAB
HCT VFR BLD AUTO: 23.4 % (ref 35–47)
HGB BLD-MCNC: 7.9 G/DL (ref 11.5–16)

## 2025-05-06 PROCEDURE — 36415 COLL VENOUS BLD VENIPUNCTURE: CPT

## 2025-05-06 PROCEDURE — 2500000003 HC RX 250 WO HCPCS: Performed by: PLASTIC SURGERY

## 2025-05-06 PROCEDURE — 6360000002 HC RX W HCPCS: Performed by: PLASTIC SURGERY

## 2025-05-06 PROCEDURE — 85014 HEMATOCRIT: CPT

## 2025-05-06 PROCEDURE — 94761 N-INVAS EAR/PLS OXIMETRY MLT: CPT

## 2025-05-06 PROCEDURE — 85018 HEMOGLOBIN: CPT

## 2025-05-06 PROCEDURE — 2580000003 HC RX 258: Performed by: PLASTIC SURGERY

## 2025-05-06 PROCEDURE — 6370000000 HC RX 637 (ALT 250 FOR IP): Performed by: PLASTIC SURGERY

## 2025-05-06 RX ORDER — 0.9 % SODIUM CHLORIDE 0.9 %
500 INTRAVENOUS SOLUTION INTRAVENOUS ONCE
Status: COMPLETED | OUTPATIENT
Start: 2025-05-06 | End: 2025-05-06

## 2025-05-06 RX ADMIN — WATER 1000 MG: 1 INJECTION INTRAMUSCULAR; INTRAVENOUS; SUBCUTANEOUS at 11:51

## 2025-05-06 RX ADMIN — SODIUM CHLORIDE, PRESERVATIVE FREE 20 ML: 5 INJECTION INTRAVENOUS at 09:58

## 2025-05-06 RX ADMIN — OXYCODONE AND ACETAMINOPHEN 1 TABLET: 325; 5 TABLET ORAL at 15:24

## 2025-05-06 RX ADMIN — OXYCODONE AND ACETAMINOPHEN 1 TABLET: 325; 5 TABLET ORAL at 10:26

## 2025-05-06 RX ADMIN — SODIUM CHLORIDE 500 ML: 0.9 INJECTION, SOLUTION INTRAVENOUS at 11:59

## 2025-05-06 RX ADMIN — SODIUM CHLORIDE, PRESERVATIVE FREE 10 ML: 5 INJECTION INTRAVENOUS at 09:57

## 2025-05-06 RX ADMIN — WATER 1000 MG: 1 INJECTION INTRAMUSCULAR; INTRAVENOUS; SUBCUTANEOUS at 05:00

## 2025-05-06 ASSESSMENT — PAIN SCALES - GENERAL
PAINLEVEL_OUTOF10: 5
PAINLEVEL_OUTOF10: 2
PAINLEVEL_OUTOF10: 5

## 2025-05-06 ASSESSMENT — PAIN DESCRIPTION - LOCATION
LOCATION: ABDOMEN
LOCATION: HEAD

## 2025-05-06 ASSESSMENT — PAIN DESCRIPTION - ORIENTATION: ORIENTATION: INNER

## 2025-05-06 NOTE — CARE COORDINATION
5/6/2025  9:03 AM    No diagnosis found.    General Risk Score: 0   Values used to calculate this score:    Points  Metrics       0        Age: 40       0        Hospital Admissions: 0       0        ED Visits: 0       0        Has Chronic Obstructive Pulmonary Disease: No       0        Has Diabetes Excluding Gestational Diabetes: No       0        Has Congestive Heart Failure: No       0        Has Liver Disease: No       0        Has Depression: No       0        Current PCP: Daniel Arguelles MD       0        Has Medicaid: No      CM reviewed EMR. No CM needs indicated at this time. Estimated discharge date is today/tomorrow. Providers, if needs arise, please consult case management.        05/06/25 0903   Services At/After Discharge   Services At/After Discharge None   Mode of Transport at Discharge Other (see comment)   Confirm Follow Up Transport Family

## 2025-05-06 NOTE — PLAN OF CARE
Discharge paperwork reviewed with patient and . No further questions at this time. IV removed. Patient being discharged home with  driving.      Problem: Safety - Adult  Goal: Free from fall injury  5/6/2025 1546 by Angelica Damon RN  Outcome: Completed  5/6/2025 1425 by Angelica Damon RN  Outcome: Progressing     Problem: Pain  Goal: Verbalizes/displays adequate comfort level or baseline comfort level  5/6/2025 1546 by Angelica Damon RN  Outcome: Completed  5/6/2025 1425 by Angelica Damon RN  Outcome: Progressing     Problem: Discharge Planning  Goal: Discharge to home or other facility with appropriate resources  5/6/2025 1546 by Angelica Damon RN  Outcome: Completed  5/6/2025 1425 by Angelica Damon RN  Outcome: Progressing     Problem: ABCDS Injury Assessment  Goal: Absence of physical injury  5/6/2025 1546 by Angelica Damon RN  Outcome: Completed  5/6/2025 1425 by Angelica Damon RN  Outcome: Progressing

## 2025-05-06 NOTE — PROGRESS NOTES
Procedure: Procedure:    ABDOMINOPLASTY WITH LIPOSUCTION TO FLANKS AND BILATERAL MASTOPEXY (GENERAL WITH EXPAREL)  CPT(R) Code:  23444 - DC EXCISION SKIN ABD INFRAUMBILICAL PANNICULECTOMY    Procedure:    ABDOMINOPLASTY WITH LIPOSUCTION TO FLANKS AND BILATERAL MASTOPEXY (GENERAL WITH EXPAREL)  CPT(R) Code:  15230 - DC SUCTION ASSISTED LIPECTOMY TRUNK    Procedure:    .  CPT(R) Code:  93574 - DC MASTOPEXY     S: BRIDGETTE o/n, denies any n/v or CP. Has been out of bed on her own to the bathroom several times with no dizziness or discomfort. Tolerates regular diet  O: AVSS    GEN: NAD, AOX3  Breasts: soft, no signs of hematoma  Abdomen: incisions are c/d/I, no signs of hematoma    A/P: 39 yo POD1 s/p     ABDOMINOPLASTY WITH LIPOSUCTION TO FLANKS AND BILATERAL MASTOPEXY with acute blood loss anemia now stable    -will plan to d/c home later today  -will ambulate with OT in the hospital prior to discharg  -cont abx  -cont pain control

## 2025-05-06 NOTE — PROGRESS NOTES
Procedure:    ABDOMINOPLASTY WITH LIPOSUCTION TO FLANKS AND BILATERAL MASTOPEXY (GENERAL WITH EXPAREL)  CPT(R) Code:  28223 - [place] EXCISION SKIN ABD INFRAUMBILICAL PANNICULECTOMY    Procedure:    ABDOMINOPLASTY WITH LIPOSUCTION TO FLANKS AND BILATERAL MASTOPEXY (GENERAL WITH EXPAREL)  CPT(R) Code:  12486 - [place] SUCTION ASSISTED LIPECTOMY TRUNK    Procedure:    .  CPT(R) Code:  97392 - [place] MASTOPEXY    S: In PACU patient was noted to have low BP with SBP in 80s and 90s. An H&H showed her Hgb to have dropped to 8.6. She had orthostatics with low SBP. I discussed the findings with the patient and recommended she stays for the night for observation. She agreed.   When patient was taken to the bathroom to void she had a syncopal episode but her vitals signs improved with SBP in 100s. She was awake and alert.   She was transferred to the floor for observation on telemetry.   Her vitals continue to be stable with SBP in 100s. She denies any discomfort. She is aware of all the events.     O: AVSS  GEN: NAD, Aox3  Breasts: soft, no signs of hematoma  Abdomen: soft, no signs of hematoma, appropriately tender, TEODORA in place with ss drainage  Extremities: SCDs in place    A/P 39 yo F POD 0 s/p abdominoplasty, liposuction, mastopexy with acute blood loss anemia but with stable vital signs now  -keep on the floor overnight  -SCDs on   -may ambulate with assistance  -regular diet  -pain control  -ancef  -will obtain follow up H&H  -plan discussed with nurse at bedside

## (undated) DEVICE — SOLUTION IRRIG 1000ML H2O PIC PLAS SHATTERPROOF CONTAINER

## (undated) DEVICE — Z DISCONTINUED NO SUB IDED SUTURE CHROMIC GUT SZ 5-0 L18IN ABSRB BRN P-3 L13MM 3/8 CIR 687G

## (undated) DEVICE — SUTURE MONOCRYL SZ 3-0 L27IN ABSRB UD L26MM SH 1/2 CIR Y416H

## (undated) DEVICE — LIQUIBAND SECURE WOUND CLOSURE, 66CM: Brand: MEDLINE

## (undated) DEVICE — GLOVE ORANGE PI 7   MSG9070

## (undated) DEVICE — BLADE,CARBON-STEEL,11,STRL,DISPOSABLE,TB: Brand: MEDLINE

## (undated) DEVICE — BLADE ES L6IN ELASTOMERIC COAT INSUL DURABLE BEND UPTO

## (undated) DEVICE — SUTURE PERMAHAND SZ 4-0 L18IN NONABSORBABLE BLK L19MM FS-2 683G

## (undated) DEVICE — SPONGE GZ W4XL4IN COT 12 PLY TYP VII WVN C FLD DSGN STERILE

## (undated) DEVICE — NEEDLE SPNL 18GA L6IN PNK LNG LEN DISP

## (undated) DEVICE — TUBING, SUCTION, 1/4" X 10', STRAIGHT: Brand: MEDLINE

## (undated) DEVICE — SUTURE PERMA-HAND SZ 2-0 L30IN NONABSORBABLE BLK L26MM SH K833H

## (undated) DEVICE — PLASTICS -SFMC: Brand: MEDLINE INDUSTRIES, INC.

## (undated) DEVICE — DRAPE FLD WRM W44XL66IN C6L FOR INTRATEMP SYS THERMABASIN

## (undated) DEVICE — SUTURE ABSRB L12IN L12MM SZ 2-0 GS-22 VLT GLYCOLIDE VLOCM2115

## (undated) DEVICE — GLOVE SURG SZ 65 L12IN FNGR THK79MIL GRN LTX FREE

## (undated) DEVICE — CANISTER, RIGID, 3000CC: Brand: MEDLINE INDUSTRIES, INC.

## (undated) DEVICE — DRAIN SURG 15FR SIL RND CHN W/ TRCR FULL FLUT DBL WRP TRAD

## (undated) DEVICE — SUTURE MONOCRYL + SZ 3-0 L27IN ABSRB UD L26MM SH 1/2 CIR MCP416H

## (undated) DEVICE — NEEDLE HYPO 25GA L1.5IN BLU POLYPR HUB S STL REG BVL STR

## (undated) DEVICE — 5MM SINGLE USE CANNULA, 10MM PORT, 30CM LONG, MERCEDES: Brand: MICROAIRE®

## (undated) DEVICE — RESERVOIR,SUCTION,100CC,SILICONE: Brand: MEDLINE

## (undated) DEVICE — Device

## (undated) DEVICE — MARKER,SKIN,WI/RULER AND LABELS: Brand: MEDLINE

## (undated) DEVICE — 1LYRTR 16FR10ML100%SIL UMS SNP: Brand: MEDLINE INDUSTRIES, INC.

## (undated) DEVICE — 3M™ TEGADERM™ TRANSPARENT FILM DRESSING FRAME STYLE, 1626W, 4 IN X 4-3/4 IN (10 CM X 12 CM), 50/CT 4CT/CASE: Brand: 3M™ TEGADERM™

## (undated) DEVICE — AEGIS 1" DISK 4MM HOLE, PEEL OPEN: Brand: MEDLINE

## (undated) DEVICE — SUTURE VLOC 90 40 L18IN ABSORBABLE UNDYED P14 NEEDLE VLOCM0123

## (undated) DEVICE — PAD,ABDOMINAL,5"X9",ST,LF,25/BX: Brand: MEDLINE INDUSTRIES, INC.

## (undated) DEVICE — STAPLER SKIN H3.9MM WIRE DIA0.58MM CRWN 6.9MM 35 STPL ROT

## (undated) DEVICE — SUTURE MONOCRYL + SZ 4-0 L27IN ABSRB UD L19MM PS-2 3/8 CIR MCP426H

## (undated) DEVICE — SUTURE PERMAHAND SZ 2-0 L18IN NONABSORBABLE BLK L26MM PS 1588H

## (undated) DEVICE — SYRINGE MED ASPIR 20 CC LUER TIP

## (undated) DEVICE — DRESSING,GAUZE,XEROFORM,CURAD,1"X8",ST: Brand: CURAD

## (undated) DEVICE — SUTURE MONOCRYL SZ 4-0 L27IN ABSRB UD L19MM PS-2 1/2 CIR PRIM Y426H

## (undated) DEVICE — DRESSING,GAUZE,XEROFORM,CURAD,5"X9",ST: Brand: CURAD

## (undated) DEVICE — SUTURE PROL SZ 0 L30IN NONABSORBABLE BLU L36MM CT-1 1/2 CIR 8424H

## (undated) DEVICE — SPONGE LAP W18XL18IN WHT COT 4 PLY FLD STRUNG RADPQ DISP ST 2 PER PACK

## (undated) DEVICE — BRA COMPR 2XL NYL LYCRA SPANDEX WHT CURAD

## (undated) DEVICE — BANDAGE,GAUZE,BULKEE II,4.5"X4.1YD,STRL: Brand: MEDLINE

## (undated) DEVICE — BLANKET WRM W35.4XL86.6IN FULL UNDERBODY + FORC AIR

## (undated) DEVICE — 5MM SINGLE USE CANNULA, 10MM PORT, 30CM LONG, FLARED MERCEDES: Brand: MICROAIRE®

## (undated) DEVICE — SYSTEM SKIN CLSR 22CM DERMBND PRINEO

## (undated) DEVICE — SUTURE VICRYL + SZ 2-0 L27IN ABSRB WHT SH 1/2 CIR TAPERCUT VCP417H

## (undated) DEVICE — PACK,ORTOHMAX/CVMAX,UNIVERSAL,5/CS: Brand: MEDLINE

## (undated) DEVICE — GLOVE SURG SZ 6 THK91MIL LTX FREE SYN POLYISOPRENE ANTI

## (undated) DEVICE — APPLICATOR MEDICATED 26 CC SOLUTION HI LT ORNG CHLORAPREP

## (undated) DEVICE — BLADE,CARBON-STEEL,10,STRL,DISPOSABLE: Brand: MEDLINE

## (undated) DEVICE — TOWEL,OR,DSP,ST,BLUE,STD,4/PK,20PK/CS: Brand: MEDLINE

## (undated) DEVICE — SOLUTION IRRIG 1000ML 09% SOD CHL USP PIC PLAS CONTAINER